# Patient Record
Sex: MALE | Race: WHITE | NOT HISPANIC OR LATINO
[De-identification: names, ages, dates, MRNs, and addresses within clinical notes are randomized per-mention and may not be internally consistent; named-entity substitution may affect disease eponyms.]

---

## 2017-02-28 ENCOUNTER — NON-APPOINTMENT (OUTPATIENT)
Age: 72
End: 2017-02-28

## 2017-02-28 ENCOUNTER — APPOINTMENT (OUTPATIENT)
Dept: CARDIOLOGY | Facility: CLINIC | Age: 72
End: 2017-02-28

## 2017-02-28 VITALS
BODY MASS INDEX: 30.92 KG/M2 | HEART RATE: 64 BPM | OXYGEN SATURATION: 98 % | DIASTOLIC BLOOD PRESSURE: 72 MMHG | WEIGHT: 204 LBS | TEMPERATURE: 97.9 F | SYSTOLIC BLOOD PRESSURE: 115 MMHG | HEIGHT: 68 IN

## 2017-03-02 LAB
ALBUMIN SERPL ELPH-MCNC: 4.2 G/DL
ALP BLD-CCNC: 88 U/L
ALT SERPL-CCNC: 38 U/L
ANION GAP SERPL CALC-SCNC: 14 MMOL/L
AST SERPL-CCNC: 28 U/L
BILIRUB SERPL-MCNC: 0.4 MG/DL
BUN SERPL-MCNC: 13 MG/DL
CALCIUM SERPL-MCNC: 9.9 MG/DL
CHLORIDE SERPL-SCNC: 102 MMOL/L
CHOLEST SERPL-MCNC: 207 MG/DL
CHOLEST/HDLC SERPL: 3.8 RATIO
CO2 SERPL-SCNC: 28 MMOL/L
CREAT SERPL-MCNC: 0.92 MG/DL
GLUCOSE SERPL-MCNC: 101 MG/DL
HBA1C MFR BLD HPLC: 6 %
HDLC SERPL-MCNC: 54 MG/DL
LDLC SERPL CALC-MCNC: 122 MG/DL
POTASSIUM SERPL-SCNC: 4.5 MMOL/L
PROT SERPL-MCNC: 7.3 G/DL
SODIUM SERPL-SCNC: 144 MMOL/L
TRIGL SERPL-MCNC: 154 MG/DL

## 2017-03-07 ENCOUNTER — MEDICATION RENEWAL (OUTPATIENT)
Age: 72
End: 2017-03-07

## 2017-05-16 ENCOUNTER — NON-APPOINTMENT (OUTPATIENT)
Age: 72
End: 2017-05-16

## 2017-05-16 ENCOUNTER — APPOINTMENT (OUTPATIENT)
Dept: CARDIOLOGY | Facility: CLINIC | Age: 72
End: 2017-05-16

## 2017-05-16 VITALS
TEMPERATURE: 97.8 F | BODY MASS INDEX: 30.46 KG/M2 | WEIGHT: 201 LBS | HEART RATE: 64 BPM | SYSTOLIC BLOOD PRESSURE: 147 MMHG | HEIGHT: 68 IN | DIASTOLIC BLOOD PRESSURE: 81 MMHG | OXYGEN SATURATION: 96 %

## 2017-05-16 VITALS — DIASTOLIC BLOOD PRESSURE: 82 MMHG | SYSTOLIC BLOOD PRESSURE: 126 MMHG

## 2017-05-16 DIAGNOSIS — R06.09 OTHER FORMS OF DYSPNEA: ICD-10-CM

## 2017-05-16 DIAGNOSIS — M75.102 UNSPECIFIED ROTATOR CUFF TEAR OR RUPTURE OF LEFT SHOULDER, NOT SPECIFIED AS TRAUMATIC: ICD-10-CM

## 2017-05-16 DIAGNOSIS — I20.9 ANGINA PECTORIS, UNSPECIFIED: ICD-10-CM

## 2017-05-16 DIAGNOSIS — I25.10 ATHEROSCLEROTIC HEART DISEASE OF NATIVE CORONARY ARTERY W/OUT ANGINA PECTORIS: ICD-10-CM

## 2017-05-18 LAB
ALBUMIN SERPL ELPH-MCNC: 4.2 G/DL
ALP BLD-CCNC: 86 U/L
ALT SERPL-CCNC: 41 U/L
ANION GAP SERPL CALC-SCNC: 17 MMOL/L
AST SERPL-CCNC: 28 U/L
BASOPHILS # BLD AUTO: 0.03 K/UL
BASOPHILS NFR BLD AUTO: 0.4 %
BILIRUB SERPL-MCNC: 0.4 MG/DL
BUN SERPL-MCNC: 23 MG/DL
CALCIUM SERPL-MCNC: 9.9 MG/DL
CHLORIDE SERPL-SCNC: 104 MMOL/L
CHOLEST SERPL-MCNC: 215 MG/DL
CHOLEST/HDLC SERPL: 3.4 RATIO
CO2 SERPL-SCNC: 25 MMOL/L
CREAT SERPL-MCNC: 1.07 MG/DL
EOSINOPHIL # BLD AUTO: 0.16 K/UL
EOSINOPHIL NFR BLD AUTO: 2.1 %
GLUCOSE SERPL-MCNC: 91 MG/DL
HBA1C MFR BLD HPLC: 6 %
HCT VFR BLD CALC: 44.9 %
HDLC SERPL-MCNC: 64 MG/DL
HGB BLD-MCNC: 14.2 G/DL
IMM GRANULOCYTES NFR BLD AUTO: 0.3 %
LDLC SERPL CALC-MCNC: 124 MG/DL
LYMPHOCYTES # BLD AUTO: 2.17 K/UL
LYMPHOCYTES NFR BLD AUTO: 28.4 %
MAN DIFF?: NORMAL
MCHC RBC-ENTMCNC: 30.1 PG
MCHC RBC-ENTMCNC: 31.6 GM/DL
MCV RBC AUTO: 95.3 FL
MONOCYTES # BLD AUTO: 0.6 K/UL
MONOCYTES NFR BLD AUTO: 7.8 %
NEUTROPHILS # BLD AUTO: 4.67 K/UL
NEUTROPHILS NFR BLD AUTO: 61 %
PLATELET # BLD AUTO: 279 K/UL
POTASSIUM SERPL-SCNC: 4.2 MMOL/L
PROT SERPL-MCNC: 7.5 G/DL
RBC # BLD: 4.71 M/UL
RBC # FLD: 15 %
SODIUM SERPL-SCNC: 146 MMOL/L
TRIGL SERPL-MCNC: 136 MG/DL
WBC # FLD AUTO: 7.65 K/UL

## 2017-12-07 ENCOUNTER — APPOINTMENT (OUTPATIENT)
Dept: DERMATOLOGY | Facility: CLINIC | Age: 72
End: 2017-12-07
Payer: MEDICARE

## 2017-12-07 VITALS — SYSTOLIC BLOOD PRESSURE: 110 MMHG | DIASTOLIC BLOOD PRESSURE: 70 MMHG

## 2017-12-07 DIAGNOSIS — B07.9 VIRAL WART, UNSPECIFIED: ICD-10-CM

## 2017-12-07 DIAGNOSIS — L82.0 INFLAMED SEBORRHEIC KERATOSIS: ICD-10-CM

## 2017-12-07 PROCEDURE — 99214 OFFICE O/P EST MOD 30 MIN: CPT | Mod: 25

## 2017-12-07 PROCEDURE — 17110 DESTRUCTION B9 LES UP TO 14: CPT

## 2018-08-31 ENCOUNTER — NON-APPOINTMENT (OUTPATIENT)
Age: 73
End: 2018-08-31

## 2018-08-31 ENCOUNTER — APPOINTMENT (OUTPATIENT)
Dept: CARDIOLOGY | Facility: CLINIC | Age: 73
End: 2018-08-31
Payer: MEDICARE

## 2018-08-31 VITALS
WEIGHT: 206 LBS | HEIGHT: 68 IN | SYSTOLIC BLOOD PRESSURE: 156 MMHG | HEART RATE: 74 BPM | BODY MASS INDEX: 31.22 KG/M2 | DIASTOLIC BLOOD PRESSURE: 92 MMHG | OXYGEN SATURATION: 97 %

## 2018-08-31 DIAGNOSIS — I65.29 OCCLUSION AND STENOSIS OF UNSPECIFIED CAROTID ARTERY: ICD-10-CM

## 2018-08-31 PROCEDURE — 99214 OFFICE O/P EST MOD 30 MIN: CPT

## 2018-08-31 PROCEDURE — 93000 ELECTROCARDIOGRAM COMPLETE: CPT

## 2018-08-31 RX ORDER — EZETIMIBE 10 MG/1
10 TABLET ORAL DAILY
Qty: 90 | Refills: 1 | Status: DISCONTINUED | COMMUNITY
Start: 2017-03-02 | End: 2018-08-31

## 2018-09-20 ENCOUNTER — APPOINTMENT (OUTPATIENT)
Dept: CARDIOLOGY | Facility: CLINIC | Age: 73
End: 2018-09-20
Payer: MEDICARE

## 2018-09-20 PROCEDURE — 93880 EXTRACRANIAL BILAT STUDY: CPT

## 2018-10-01 ENCOUNTER — APPOINTMENT (OUTPATIENT)
Dept: CARDIOLOGY | Facility: CLINIC | Age: 73
End: 2018-10-01
Payer: MEDICARE

## 2018-10-01 ENCOUNTER — NON-APPOINTMENT (OUTPATIENT)
Age: 73
End: 2018-10-01

## 2018-10-01 VITALS
DIASTOLIC BLOOD PRESSURE: 70 MMHG | OXYGEN SATURATION: 96 % | BODY MASS INDEX: 32.28 KG/M2 | HEART RATE: 93 BPM | WEIGHT: 213 LBS | HEIGHT: 68 IN | SYSTOLIC BLOOD PRESSURE: 102 MMHG

## 2018-10-01 PROCEDURE — 93000 ELECTROCARDIOGRAM COMPLETE: CPT

## 2018-10-01 PROCEDURE — 99214 OFFICE O/P EST MOD 30 MIN: CPT

## 2018-10-04 LAB
25(OH)D3 SERPL-MCNC: 29.9 NG/ML
ALBUMIN SERPL ELPH-MCNC: 3.9 G/DL
ALP BLD-CCNC: 71 U/L
ALT SERPL-CCNC: 33 U/L
ANION GAP SERPL CALC-SCNC: 16 MMOL/L
APPEARANCE: CLEAR
AST SERPL-CCNC: 36 U/L
BACTERIA: NEGATIVE
BASOPHILS # BLD AUTO: 0.04 K/UL
BASOPHILS NFR BLD AUTO: 0.5 %
BILIRUB SERPL-MCNC: 0.3 MG/DL
BILIRUBIN URINE: NEGATIVE
BLOOD URINE: NEGATIVE
BUN SERPL-MCNC: 19 MG/DL
CALCIUM SERPL-MCNC: 9.8 MG/DL
CHLORIDE SERPL-SCNC: 103 MMOL/L
CHOLEST SERPL-MCNC: 213 MG/DL
CHOLEST/HDLC SERPL: 5 RATIO
CO2 SERPL-SCNC: 24 MMOL/L
COLOR: YELLOW
CREAT SERPL-MCNC: 1.12 MG/DL
EOSINOPHIL # BLD AUTO: 0.25 K/UL
EOSINOPHIL NFR BLD AUTO: 3.3 %
GLUCOSE QUALITATIVE U: NEGATIVE MG/DL
GLUCOSE SERPL-MCNC: 104 MG/DL
HBA1C MFR BLD HPLC: 6.2 %
HCT VFR BLD CALC: 41.2 %
HDLC SERPL-MCNC: 43 MG/DL
HGB BLD-MCNC: 13.6 G/DL
HYALINE CASTS: 6 /LPF
IMM GRANULOCYTES NFR BLD AUTO: 0.1 %
KETONES URINE: NEGATIVE
LDLC SERPL CALC-MCNC: 130 MG/DL
LEUKOCYTE ESTERASE URINE: ABNORMAL
LYMPHOCYTES # BLD AUTO: 1.78 K/UL
LYMPHOCYTES NFR BLD AUTO: 23.6 %
MAN DIFF?: NORMAL
MCHC RBC-ENTMCNC: 30.4 PG
MCHC RBC-ENTMCNC: 33 GM/DL
MCV RBC AUTO: 92.2 FL
MICROSCOPIC-UA: NORMAL
MONOCYTES # BLD AUTO: 0.61 K/UL
MONOCYTES NFR BLD AUTO: 8.1 %
NEUTROPHILS # BLD AUTO: 4.84 K/UL
NEUTROPHILS NFR BLD AUTO: 64.4 %
NITRITE URINE: NEGATIVE
PH URINE: 5.5
PLATELET # BLD AUTO: 268 K/UL
POTASSIUM SERPL-SCNC: 4 MMOL/L
PROT SERPL-MCNC: 7.7 G/DL
PROTEIN URINE: 100 MG/DL
RBC # BLD: 4.47 M/UL
RBC # FLD: 15 %
RED BLOOD CELLS URINE: 1 /HPF
SODIUM SERPL-SCNC: 142 MMOL/L
SPECIFIC GRAVITY URINE: 1.02
SQUAMOUS EPITHELIAL CELLS: 2 /HPF
TRIGL SERPL-MCNC: 199 MG/DL
TSH SERPL-ACNC: 1.59 UIU/ML
UROBILINOGEN URINE: 1 MG/DL
WBC # FLD AUTO: 7.53 K/UL
WHITE BLOOD CELLS URINE: 13 /HPF

## 2018-12-06 ENCOUNTER — LABORATORY RESULT (OUTPATIENT)
Age: 73
End: 2018-12-06

## 2018-12-06 ENCOUNTER — APPOINTMENT (OUTPATIENT)
Dept: DERMATOLOGY | Facility: CLINIC | Age: 73
End: 2018-12-06
Payer: MEDICARE

## 2018-12-06 VITALS — SYSTOLIC BLOOD PRESSURE: 138 MMHG | DIASTOLIC BLOOD PRESSURE: 84 MMHG

## 2018-12-06 DIAGNOSIS — D22.9 MELANOCYTIC NEVI, UNSPECIFIED: ICD-10-CM

## 2018-12-06 DIAGNOSIS — Z12.83 ENCOUNTER FOR SCREENING FOR MALIGNANT NEOPLASM OF SKIN: ICD-10-CM

## 2018-12-06 DIAGNOSIS — D48.5 NEOPLASM OF UNCERTAIN BEHAVIOR OF SKIN: ICD-10-CM

## 2018-12-06 DIAGNOSIS — L82.1 OTHER SEBORRHEIC KERATOSIS: ICD-10-CM

## 2018-12-06 PROCEDURE — 99214 OFFICE O/P EST MOD 30 MIN: CPT | Mod: 25

## 2018-12-06 PROCEDURE — 11101 BIOPSY SKIN SUBQ&/MUCOUS MEMBRANE EA ADDL LESN: CPT

## 2018-12-06 PROCEDURE — 11100 BX SKIN SUBCUTANEOUS&/MUCOUS MEMBRANE 1 LESION: CPT

## 2018-12-13 ENCOUNTER — APPOINTMENT (OUTPATIENT)
Dept: CARDIOLOGY | Facility: CLINIC | Age: 73
End: 2018-12-13
Payer: MEDICARE

## 2018-12-13 VITALS
HEIGHT: 68 IN | HEART RATE: 74 BPM | SYSTOLIC BLOOD PRESSURE: 120 MMHG | BODY MASS INDEX: 31.37 KG/M2 | OXYGEN SATURATION: 96 % | WEIGHT: 207 LBS | DIASTOLIC BLOOD PRESSURE: 82 MMHG

## 2018-12-13 PROCEDURE — 99214 OFFICE O/P EST MOD 30 MIN: CPT

## 2018-12-13 NOTE — PHYSICAL EXAM
[General Appearance - Well Developed] : well developed [Normal Appearance] : normal appearance [Well Groomed] : well groomed [General Appearance - Well Nourished] : well nourished [No Deformities] : no deformities [General Appearance - In No Acute Distress] : no acute distress [Normal Conjunctiva] : the conjunctiva exhibited no abnormalities [Eyelids - No Xanthelasma] : the eyelids demonstrated no xanthelasmas [Normal Oral Mucosa] : normal oral mucosa [No Oral Pallor] : no oral pallor [No Oral Cyanosis] : no oral cyanosis [Normal Jugular Venous A Waves Present] : normal jugular venous A waves present [Normal Jugular Venous V Waves Present] : normal jugular venous V waves present [No Jugular Venous Flores A Waves] : no jugular venous flores A waves [Respiration, Rhythm And Depth] : normal respiratory rhythm and effort [Exaggerated Use Of Accessory Muscles For Inspiration] : no accessory muscle use [Auscultation Breath Sounds / Voice Sounds] : lungs were clear to auscultation bilaterally [Abdomen Soft] : soft [Abdomen Tenderness] : non-tender [Abdomen Mass (___ Cm)] : no abdominal mass palpated [Abnormal Walk] : normal gait [Gait - Sufficient For Exercise Testing] : the gait was sufficient for exercise testing [Nail Clubbing] : no clubbing of the fingernails [Cyanosis, Localized] : no localized cyanosis [Petechial Hemorrhages (___cm)] : no petechial hemorrhages [Skin Color & Pigmentation] : normal skin color and pigmentation [] : no rash [No Venous Stasis] : no venous stasis [Skin Lesions] : no skin lesions [No Skin Ulcers] : no skin ulcer [No Xanthoma] : no  xanthoma was observed [Oriented To Time, Place, And Person] : oriented to person, place, and time [Affect] : the affect was normal [Mood] : the mood was normal [No Anxiety] : not feeling anxious [Normal Rate] : normal [Normal S1] : normal S1 [Normal S2] : normal S2 [S3] : no S3 [S4] : no S4 [II] : a grade 2 [Right Carotid Bruit] : no bruit heard over the right carotid [Left Carotid Bruit] : no bruit heard over the left carotid [Right Femoral Bruit] : no bruit heard over the right femoral artery [Left Femoral Bruit] : no bruit heard over the left femoral artery [2+] : left 2+ [No Abnormalities] : the abdominal aorta was not enlarged and no bruit was heard [No Pitting Edema] : no pitting edema present

## 2018-12-13 NOTE — HISTORY OF PRESENT ILLNESS
[FreeTextEntry1] : Mr. Alvarez is a 73 year-old male with a long history of atherosclerotic heart disease. He had coronary artery bypass graft in 1995. He subsequently had stenting to the saphenous vein graft to the OM1 in 2007 and  2009. In February 2013 the patient was getting a lot of angina and had an abnormal stress test. As of 2013, there was interval closure of the left circumflex stents. The RCA and distal left circumflex were closed, as were seen previously and there were no lesions amenable to PCI. His medications were adjusted; and he has been without angina since that time.\par \par He is feeling well today and his blood pressures are much better controlled. He has resumed his prior BP regimen. I last saw him 10//2018.\par Carotid dopplers with mild plaque bilaterally\par \par He denies chest pain, difficulty breathing, palpitations, lower extremity swelling, orthopnea, PND, dizziness, lightheadedness and near syncope. He is feeling well overall, except for the blood pressures. He reports a stress test and echocardiogram earlier this year, but the results are unavailable.

## 2018-12-13 NOTE — REVIEW OF SYSTEMS
[Blurry Vision] : no blurred vision [see HPI] : see HPI [Shortness Of Breath] : no shortness of breath [Dyspnea on exertion] : not dyspnea during exertion [Lower Ext Edema] : no extremity edema [Leg Claudication] : no intermittent leg claudication [Cough] : no cough [Urinary Frequency] : no change in urinary frequency [Hematuria] : no hematuria [Nocturia] : no nocturia [Joint Pain] : joint pain [Joint Stiffness] : joint stiffness [Negative] : Heme/Lymph

## 2018-12-13 NOTE — REASON FOR VISIT
[Follow-Up - Clinic] : a clinic follow-up of [Angina Pectoris] : angina pectoris [Aortic Stenosis] : aortic stenosis [Chest Pain] : chest pain [Coronary Artery Disease] : coronary artery disease

## 2018-12-13 NOTE — DISCUSSION/SUMMARY
[___ Month(s)] : [unfilled] month(s) [With Me] : with me [FreeTextEntry1] : Mr. Alvarez is a 73 year-old male with a long history of atherosclerotic heart disease and HTN.\par \par He is status post bypass in 1995, with stents to the SVG to OM1 2007 in 2009.\par \par He is feeling well today and his BP is ideal.\par His physical exam is unremarkable. There is no evidence of volume overload. His EKG is a sinus rhythm with probable LVH, nonspecific T-wave inversions, unchanged from prior EKGs.\par \par I will continue his quinapril 20 mg p.o. b.i.d., and Toprol , imdur 30 and norvasc 10. \par \par He will continue taking aspirin and statin for his history of CAD. He is to have his most recent stress test and echocardiogram faxed office. Carotid sonogram with mild to moderate plaque bilaterally.\par His most recent LDL is 130, which remains elevated. I have added Zetia 10 mg po daily back to his regimen.\par We discussed the importance of diet, exercise and weight loss in detail.\par \par I will see him again in 3 months. He knows to call with any issues or questions. \par

## 2019-02-14 ENCOUNTER — APPOINTMENT (OUTPATIENT)
Dept: CARDIOLOGY | Facility: CLINIC | Age: 74
End: 2019-02-14

## 2019-03-19 ENCOUNTER — NON-APPOINTMENT (OUTPATIENT)
Age: 74
End: 2019-03-19

## 2019-03-19 ENCOUNTER — APPOINTMENT (OUTPATIENT)
Dept: CARDIOLOGY | Facility: CLINIC | Age: 74
End: 2019-03-19
Payer: MEDICARE

## 2019-03-19 VITALS
HEART RATE: 93 BPM | OXYGEN SATURATION: 98 % | BODY MASS INDEX: 31.07 KG/M2 | DIASTOLIC BLOOD PRESSURE: 87 MMHG | HEIGHT: 68 IN | SYSTOLIC BLOOD PRESSURE: 131 MMHG | WEIGHT: 205 LBS

## 2019-03-19 PROCEDURE — 93000 ELECTROCARDIOGRAM COMPLETE: CPT

## 2019-03-19 PROCEDURE — 99214 OFFICE O/P EST MOD 30 MIN: CPT

## 2019-03-19 NOTE — DISCUSSION/SUMMARY
[___ Month(s)] : [unfilled] month(s) [With Me] : with me [FreeTextEntry1] : Mr. Alvarez is a 73 year-old male with a long history of atherosclerotic heart disease and HTN.\par \par He is status post bypass in 1995, with stents to the SVG to OM1 2007 in 2009.\par \par He is feeling well today and his BP is at goal\par His physical exam is unremarkable. There is no evidence of volume overload. His EKG is a sinus rhythm with probable LVH, inferior infarct, and nonspecific T-wave inversions, unchanged from prior EKGs.\par \par I will continue his quinapril 20 mg, along with Toprol , imdur 30 and norvasc 10. \par He will continue taking aspirin and statin for his history of CAD. Carotid sonogram with mild to moderate plaque bilaterally. His most recent LDL was 130 on Lipitor, and Zetia has been added to his regimen. I will repeat his blood work today; we discussed the option of changing to the a pcsk9 inhibitor if it remains elevated.\par Stress test in 2018 with inferior infarct and mild fatmata-infarct ischemia.\par We discussed the importance of diet, exercise and weight loss in detail.\par \par I will see him again in 3 months. He knows to call with any issues or questions.

## 2019-03-19 NOTE — HISTORY OF PRESENT ILLNESS
[FreeTextEntry1] : Mr. Alvarez is a 73 year-old male with a long history of atherosclerotic heart disease. He had coronary artery bypass graft in 1995. He subsequently had stenting to the saphenous vein graft to the OM1 in 2007 and  2009. In February 2013 the patient was getting a lot of angina and had an abnormal stress test. As of 2013, there was interval closure of the left circumflex stents. The RCA and distal left circumflex were closed, as were seen previously and there were no lesions amenable to PCI. His medications were adjusted; and he has been without angina since that time.\par \par He is feeling well today and his blood pressures are much better controlled. He has resumed his prior BP regimen though is now taking quinapril once/day. I last saw him 12/13/2018.\par Carotid dopplers with mild plaque bilaterally. Because of suboptimal cholesterol, I added zetia to his regimen.\par He denies chest pain, difficulty breathing, palpitations, lower extremity swelling, orthopnea, PND, dizziness, lightheadedness and near syncope. He is feeling well overall, except for the blood pressures. He reports a stress test and echocardiogram earlier in 2018. Echo in 5/2018 was unremarkable. Pharm stress with large inferior infarct with mild fatmata-infarct ischemia.

## 2019-03-19 NOTE — PHYSICAL EXAM
[General Appearance - Well Developed] : well developed [Normal Appearance] : normal appearance [Well Groomed] : well groomed [General Appearance - Well Nourished] : well nourished [No Deformities] : no deformities [General Appearance - In No Acute Distress] : no acute distress [Normal Conjunctiva] : the conjunctiva exhibited no abnormalities [Eyelids - No Xanthelasma] : the eyelids demonstrated no xanthelasmas [Normal Oral Mucosa] : normal oral mucosa [No Oral Pallor] : no oral pallor [No Oral Cyanosis] : no oral cyanosis [Normal Jugular Venous A Waves Present] : normal jugular venous A waves present [Normal Jugular Venous V Waves Present] : normal jugular venous V waves present [No Jugular Venous Flores A Waves] : no jugular venous flores A waves [Respiration, Rhythm And Depth] : normal respiratory rhythm and effort [Exaggerated Use Of Accessory Muscles For Inspiration] : no accessory muscle use [Auscultation Breath Sounds / Voice Sounds] : lungs were clear to auscultation bilaterally [Abdomen Soft] : soft [Abdomen Tenderness] : non-tender [Abdomen Mass (___ Cm)] : no abdominal mass palpated [Abnormal Walk] : normal gait [Gait - Sufficient For Exercise Testing] : the gait was sufficient for exercise testing [Nail Clubbing] : no clubbing of the fingernails [Cyanosis, Localized] : no localized cyanosis [Petechial Hemorrhages (___cm)] : no petechial hemorrhages [Skin Color & Pigmentation] : normal skin color and pigmentation [] : no rash [No Venous Stasis] : no venous stasis [Skin Lesions] : no skin lesions [No Skin Ulcers] : no skin ulcer [Oriented To Time, Place, And Person] : oriented to person, place, and time [No Xanthoma] : no  xanthoma was observed [Affect] : the affect was normal [Mood] : the mood was normal [No Anxiety] : not feeling anxious [Normal Rate] : normal [Normal S1] : normal S1 [Normal S2] : normal S2 [S3] : no S3 [S4] : no S4 [II] : a grade 2 [Right Carotid Bruit] : no bruit heard over the right carotid [Left Carotid Bruit] : no bruit heard over the left carotid [Right Femoral Bruit] : no bruit heard over the right femoral artery [Left Femoral Bruit] : no bruit heard over the left femoral artery [2+] : left 2+ [No Abnormalities] : the abdominal aorta was not enlarged and no bruit was heard [No Pitting Edema] : no pitting edema present

## 2019-03-19 NOTE — REVIEW OF SYSTEMS
[Blurry Vision] : no blurred vision [see HPI] : see HPI [Shortness Of Breath] : no shortness of breath [Lower Ext Edema] : no extremity edema [Dyspnea on exertion] : not dyspnea during exertion [Leg Claudication] : no intermittent leg claudication [Cough] : no cough [Urinary Frequency] : no change in urinary frequency [Hematuria] : no hematuria [Joint Pain] : joint pain [Nocturia] : no nocturia [Joint Stiffness] : joint stiffness [Negative] : Endocrine

## 2019-03-21 LAB
ALBUMIN SERPL ELPH-MCNC: 4.5 G/DL
ALP BLD-CCNC: 85 U/L
ALT SERPL-CCNC: 28 U/L
ANION GAP SERPL CALC-SCNC: 15 MMOL/L
APPEARANCE: CLEAR
AST SERPL-CCNC: 28 U/L
BACTERIA: NEGATIVE
BASOPHILS # BLD AUTO: 0.04 K/UL
BASOPHILS NFR BLD AUTO: 0.5 %
BILIRUB SERPL-MCNC: 0.3 MG/DL
BILIRUBIN URINE: NEGATIVE
BLOOD URINE: NEGATIVE
BUN SERPL-MCNC: 15 MG/DL
CALCIUM SERPL-MCNC: 10.2 MG/DL
CHLORIDE SERPL-SCNC: 105 MMOL/L
CHOLEST SERPL-MCNC: 197 MG/DL
CHOLEST/HDLC SERPL: 4.1 RATIO
CO2 SERPL-SCNC: 25 MMOL/L
COLOR: YELLOW
CREAT SERPL-MCNC: 1.09 MG/DL
EOSINOPHIL # BLD AUTO: 0.25 K/UL
EOSINOPHIL NFR BLD AUTO: 3.3 %
GLUCOSE QUALITATIVE U: NEGATIVE
GLUCOSE SERPL-MCNC: 122 MG/DL
HBA1C MFR BLD HPLC: 6 %
HCT VFR BLD CALC: 42.8 %
HDLC SERPL-MCNC: 48 MG/DL
HGB BLD-MCNC: 13.5 G/DL
HYALINE CASTS: 0 /LPF
IMM GRANULOCYTES NFR BLD AUTO: 0.3 %
KETONES URINE: NEGATIVE
LDLC SERPL CALC-MCNC: 108 MG/DL
LEUKOCYTE ESTERASE URINE: NEGATIVE
LYMPHOCYTES # BLD AUTO: 1.5 K/UL
LYMPHOCYTES NFR BLD AUTO: 20 %
MAN DIFF?: NORMAL
MCHC RBC-ENTMCNC: 30 PG
MCHC RBC-ENTMCNC: 31.5 GM/DL
MCV RBC AUTO: 95.1 FL
MICROSCOPIC-UA: NORMAL
MONOCYTES # BLD AUTO: 0.58 K/UL
MONOCYTES NFR BLD AUTO: 7.7 %
NEUTROPHILS # BLD AUTO: 5.11 K/UL
NEUTROPHILS NFR BLD AUTO: 68.2 %
NITRITE URINE: NEGATIVE
PH URINE: 5.5
PLATELET # BLD AUTO: 290 K/UL
POTASSIUM SERPL-SCNC: 4.5 MMOL/L
PROT SERPL-MCNC: 7.4 G/DL
PROTEIN URINE: ABNORMAL
RBC # BLD: 4.5 M/UL
RBC # FLD: 14.5 %
RED BLOOD CELLS URINE: 1 /HPF
SODIUM SERPL-SCNC: 145 MMOL/L
SPECIFIC GRAVITY URINE: 1.02
SQUAMOUS EPITHELIAL CELLS: 2 /HPF
TRIGL SERPL-MCNC: 205 MG/DL
TSH SERPL-ACNC: 1.21 UIU/ML
URINE COMMENTS: NORMAL
UROBILINOGEN URINE: NORMAL
WBC # FLD AUTO: 7.5 K/UL
WHITE BLOOD CELLS URINE: 12 /HPF

## 2019-03-22 ENCOUNTER — RX RENEWAL (OUTPATIENT)
Age: 74
End: 2019-03-22

## 2019-06-24 ENCOUNTER — NON-APPOINTMENT (OUTPATIENT)
Age: 74
End: 2019-06-24

## 2019-06-24 ENCOUNTER — APPOINTMENT (OUTPATIENT)
Dept: CARDIOLOGY | Facility: CLINIC | Age: 74
End: 2019-06-24
Payer: MEDICARE

## 2019-06-24 VITALS
DIASTOLIC BLOOD PRESSURE: 80 MMHG | BODY MASS INDEX: 31.67 KG/M2 | WEIGHT: 209 LBS | HEIGHT: 68 IN | SYSTOLIC BLOOD PRESSURE: 120 MMHG | HEART RATE: 77 BPM | OXYGEN SATURATION: 96 %

## 2019-06-24 DIAGNOSIS — R06.00 DYSPNEA, UNSPECIFIED: ICD-10-CM

## 2019-06-24 PROCEDURE — 93000 ELECTROCARDIOGRAM COMPLETE: CPT

## 2019-06-24 PROCEDURE — 99214 OFFICE O/P EST MOD 30 MIN: CPT

## 2019-06-24 NOTE — HISTORY OF PRESENT ILLNESS
[FreeTextEntry1] : Mr. Alvarez is a 73 year-old male with a long history of atherosclerotic heart disease. He had coronary artery bypass graft in 1995. He subsequently had stenting to the saphenous vein graft to the OM1 in 2007 and  2009. In February 2013 the patient was getting a lot of angina and had an abnormal stress test. As of 2013, there was interval closure of the left circumflex stents. The RCA and distal left circumflex were closed, as were seen previously and there were no lesions amenable to PCI. His medications were adjusted; and he has been without angina since that time.\par \par He is feeling well today and his blood pressures are much better controlled. He has resumed his prior BP regimen though is now taking quinapril once/day. I last saw him 3/2019.\par Carotid dopplers with mild plaque bilaterally. Because of suboptimal cholesterol, I added zetia to his regimen.\par He denies chest pain, palpitations, lower extremity swelling, orthopnea, PND, dizziness, lightheadedness and near syncope. \par He reports some dyspnea on exertion, though thinks his exercise tolerance is down because of orthopedic hip and back pain.\par Echo in 5/2018 was unremarkable. Pharm stress with large inferior infarct with mild fatmata-infarct ischemia, which was decided to be medically managed.

## 2019-06-24 NOTE — PHYSICAL EXAM
[General Appearance - Well Developed] : well developed [Normal Appearance] : normal appearance [Well Groomed] : well groomed [General Appearance - Well Nourished] : well nourished [No Deformities] : no deformities [Normal Conjunctiva] : the conjunctiva exhibited no abnormalities [General Appearance - In No Acute Distress] : no acute distress [Normal Oral Mucosa] : normal oral mucosa [Eyelids - No Xanthelasma] : the eyelids demonstrated no xanthelasmas [No Oral Pallor] : no oral pallor [No Oral Cyanosis] : no oral cyanosis [Normal Jugular Venous A Waves Present] : normal jugular venous A waves present [Normal Jugular Venous V Waves Present] : normal jugular venous V waves present [No Jugular Venous Flores A Waves] : no jugular venous flores A waves [Respiration, Rhythm And Depth] : normal respiratory rhythm and effort [Exaggerated Use Of Accessory Muscles For Inspiration] : no accessory muscle use [Abdomen Soft] : soft [Auscultation Breath Sounds / Voice Sounds] : lungs were clear to auscultation bilaterally [Abdomen Tenderness] : non-tender [Abdomen Mass (___ Cm)] : no abdominal mass palpated [Gait - Sufficient For Exercise Testing] : the gait was sufficient for exercise testing [Abnormal Walk] : normal gait [Nail Clubbing] : no clubbing of the fingernails [Cyanosis, Localized] : no localized cyanosis [Petechial Hemorrhages (___cm)] : no petechial hemorrhages [Skin Color & Pigmentation] : normal skin color and pigmentation [] : no rash [No Venous Stasis] : no venous stasis [Skin Lesions] : no skin lesions [No Skin Ulcers] : no skin ulcer [No Xanthoma] : no  xanthoma was observed [Affect] : the affect was normal [Oriented To Time, Place, And Person] : oriented to person, place, and time [Mood] : the mood was normal [No Anxiety] : not feeling anxious [Normal Rate] : normal [Normal S1] : normal S1 [Normal S2] : normal S2 [S3] : no S3 [S4] : no S4 [II] : a grade 2 [Right Carotid Bruit] : no bruit heard over the right carotid [Left Carotid Bruit] : no bruit heard over the left carotid [Left Femoral Bruit] : no bruit heard over the left femoral artery [Right Femoral Bruit] : no bruit heard over the right femoral artery [2+] : left 2+ [No Abnormalities] : the abdominal aorta was not enlarged and no bruit was heard [No Pitting Edema] : no pitting edema present

## 2019-06-24 NOTE — REVIEW OF SYSTEMS
[Blurry Vision] : no blurred vision [see HPI] : see HPI [Shortness Of Breath] : shortness of breath [Dyspnea on exertion] : dyspnea during exertion [Lower Ext Edema] : no extremity edema [Leg Claudication] : no intermittent leg claudication [Cough] : no cough [Urinary Frequency] : no change in urinary frequency [Hematuria] : no hematuria [Nocturia] : no nocturia [Joint Pain] : joint pain [Joint Stiffness] : joint stiffness [Negative] : Heme/Lymph

## 2019-06-24 NOTE — DISCUSSION/SUMMARY
[___ Month(s)] : [unfilled] month(s) [With Me] : with me [FreeTextEntry1] : Mr. Alvraez is a 73 year-old male with a long history of atherosclerotic heart disease and HTN.\par He is status post bypass in 1995, with stents to the SVG to OM1 2007 in 2009.\par \par He reports some mild dyspnea on exertion over the last few weeks, which he noticed while he was on vacation in [facility]. His BP is finally controlled\par His physical exam is unremarkable. There is no evidence of volume overload. His EKG is a sinus rhythm with probable LVH, inferior infarct, and nonspecific T-wave inversions, unchanged from prior EKGs.\par \par I will continue his quinapril 20 mg, along with Toprol , imdur 30 and norvasc 10. \par He will continue taking aspirin and statin for his history of CAD. Carotid sonogram with mild to moderate plaque bilaterally. His most recent LDL was 108 on Lipitor, and Zetia. We discussed the option of changing to the a pcsk9 inhibitor if it remains elevated, though he would like to hold off\par Stress test in 2018 with inferior infarct and mild fatmata-infarct ischemia. He will have a 2d echocardiogram to confirm no change in LV function in the setting of dyspnea.\par We discussed the importance of diet, exercise and weight loss in detail.\par \par I will see him again in 3 months. He knows to call with any issues or questions.

## 2019-07-10 ENCOUNTER — APPOINTMENT (OUTPATIENT)
Dept: CARDIOLOGY | Facility: CLINIC | Age: 74
End: 2019-07-10
Payer: MEDICARE

## 2019-07-10 PROCEDURE — 93306 TTE W/DOPPLER COMPLETE: CPT

## 2019-07-17 ENCOUNTER — RX RENEWAL (OUTPATIENT)
Age: 74
End: 2019-07-17

## 2019-08-12 ENCOUNTER — RX RENEWAL (OUTPATIENT)
Age: 74
End: 2019-08-12

## 2019-09-16 ENCOUNTER — MEDICATION RENEWAL (OUTPATIENT)
Age: 74
End: 2019-09-16

## 2019-11-15 ENCOUNTER — NON-APPOINTMENT (OUTPATIENT)
Age: 74
End: 2019-11-15

## 2019-11-15 ENCOUNTER — APPOINTMENT (OUTPATIENT)
Dept: CARDIOLOGY | Facility: CLINIC | Age: 74
End: 2019-11-15
Payer: MEDICARE

## 2019-11-15 VITALS
HEIGHT: 68 IN | BODY MASS INDEX: 31.83 KG/M2 | HEART RATE: 74 BPM | OXYGEN SATURATION: 96 % | DIASTOLIC BLOOD PRESSURE: 72 MMHG | SYSTOLIC BLOOD PRESSURE: 104 MMHG | WEIGHT: 210 LBS

## 2019-11-15 PROCEDURE — 93000 ELECTROCARDIOGRAM COMPLETE: CPT

## 2019-11-15 PROCEDURE — 99214 OFFICE O/P EST MOD 30 MIN: CPT

## 2019-11-15 NOTE — REASON FOR VISIT
[Follow-Up - Clinic] : a clinic follow-up of [Aortic Stenosis] : aortic stenosis [Angina Pectoris] : angina pectoris [Coronary Artery Disease] : coronary artery disease [Chest Pain] : chest pain

## 2019-11-15 NOTE — PHYSICAL EXAM
[Normal Appearance] : normal appearance [General Appearance - Well Developed] : well developed [Well Groomed] : well groomed [No Deformities] : no deformities [General Appearance - In No Acute Distress] : no acute distress [General Appearance - Well Nourished] : well nourished [Normal Conjunctiva] : the conjunctiva exhibited no abnormalities [Eyelids - No Xanthelasma] : the eyelids demonstrated no xanthelasmas [Normal Oral Mucosa] : normal oral mucosa [No Oral Pallor] : no oral pallor [Normal Jugular Venous V Waves Present] : normal jugular venous V waves present [Normal Jugular Venous A Waves Present] : normal jugular venous A waves present [No Oral Cyanosis] : no oral cyanosis [No Jugular Venous Flores A Waves] : no jugular venous flores A waves [Exaggerated Use Of Accessory Muscles For Inspiration] : no accessory muscle use [Respiration, Rhythm And Depth] : normal respiratory rhythm and effort [Auscultation Breath Sounds / Voice Sounds] : lungs were clear to auscultation bilaterally [Abdomen Tenderness] : non-tender [Abdomen Soft] : soft [Abnormal Walk] : normal gait [Abdomen Mass (___ Cm)] : no abdominal mass palpated [Nail Clubbing] : no clubbing of the fingernails [Gait - Sufficient For Exercise Testing] : the gait was sufficient for exercise testing [Cyanosis, Localized] : no localized cyanosis [Petechial Hemorrhages (___cm)] : no petechial hemorrhages [Skin Color & Pigmentation] : normal skin color and pigmentation [] : no rash [No Venous Stasis] : no venous stasis [No Skin Ulcers] : no skin ulcer [Skin Lesions] : no skin lesions [No Xanthoma] : no  xanthoma was observed [Oriented To Time, Place, And Person] : oriented to person, place, and time [Mood] : the mood was normal [Affect] : the affect was normal [No Anxiety] : not feeling anxious [Normal Rate] : normal [Normal S1] : normal S1 [S3] : no S3 [Normal S2] : normal S2 [S4] : no S4 [II] : a grade 2 [Right Femoral Bruit] : no bruit heard over the right femoral artery [Left Carotid Bruit] : no bruit heard over the left carotid [Left Femoral Bruit] : no bruit heard over the left femoral artery [Right Carotid Bruit] : no bruit heard over the right carotid [2+] : right 2+ [No Abnormalities] : the abdominal aorta was not enlarged and no bruit was heard [No Pitting Edema] : no pitting edema present

## 2019-11-15 NOTE — HISTORY OF PRESENT ILLNESS
[FreeTextEntry1] : Mr. Alvarez is a 74 year-old male with a long history of atherosclerotic heart disease. He had coronary artery bypass graft in 1995. He subsequently had stenting to the saphenous vein graft to the OM1 in 2007 and  2009. In February 2013 the patient was getting a lot of angina and had an abnormal stress test. As of 2013, there was interval closure of the left circumflex stents. The RCA and distal left circumflex were closed, as were seen previously and there were no lesions amenable to PCI. His medications were adjusted; and he has been without angina since that time.\par \par He is feeling well today and his blood pressures are much better controlled. He has resumed his prior BP regimen though is now taking quinapril once/day. I last saw him 6/2019\par Carotid dopplers with mild plaque bilaterally. Because of suboptimal cholesterol, I added zetia to his regimen. His most recent ldl was around 100.\par He denies chest pain, palpitations, lower extremity swelling, orthopnea, PND, dizziness, lightheadedness and near syncope.  He recently returned from a trip from [facility], and felt well.\par \par Echo in 5/2018 was unremarkable. Pharm stress with large inferior infarct with mild fatmata-infarct ischemia, which was decided to be medically managed.

## 2019-11-15 NOTE — REVIEW OF SYSTEMS
[see HPI] : see HPI [Blurry Vision] : no blurred vision [Dyspnea on exertion] : dyspnea during exertion [Lower Ext Edema] : no extremity edema [Cough] : no cough [Leg Claudication] : no intermittent leg claudication [Hematuria] : no hematuria [Nocturia] : no nocturia [Urinary Frequency] : no change in urinary frequency [Joint Stiffness] : joint stiffness [Joint Pain] : joint pain [Negative] : Endocrine

## 2019-11-15 NOTE — DISCUSSION/SUMMARY
[With Me] : with me [___ Month(s)] : [unfilled] month(s) [FreeTextEntry1] : Mr. Alvarez is a 74 year-old male with a long history of atherosclerotic heart disease and HTN.\par He is status post bypass in 1995, with stents to the SVG to OM1 2007 in 2009.\par \par He is feeling well and his BP is better controlled.\par His physical exam is unremarkable. There is no evidence of volume overload. His EKG is a sinus rhythm with probable LVH, inferior infarct, and nonspecific T-wave inversions, unchanged from prior EKGs.\par \par I will continue his quinapril 40 mg, along with Toprol , imdur 30 and norvasc 10. \par He will continue taking aspirin and statin for his history of CAD. Carotid sonogram with mild to moderate plaque bilaterally. His most recent LDL was 108 on Lipitor, and Zetia. We discussed the option of changing to the a pcsk9 inhibitor if it remains elevated, though he would like to hold off\par Stress test in 2018 with inferior infarct and mild fatmata-infarct ischemia. Echo with normal LV function in 7/2019.\par We discussed the importance of diet, exercise and weight loss in detail.\par \par I will see him again in 3 months. He knows to call with any issues or questions.

## 2019-12-05 ENCOUNTER — APPOINTMENT (OUTPATIENT)
Dept: DERMATOLOGY | Facility: CLINIC | Age: 74
End: 2019-12-05

## 2020-02-12 ENCOUNTER — APPOINTMENT (OUTPATIENT)
Dept: CARDIOLOGY | Facility: CLINIC | Age: 75
End: 2020-02-12
Payer: MEDICARE

## 2020-02-12 VITALS
HEART RATE: 72 BPM | BODY MASS INDEX: 28.19 KG/M2 | DIASTOLIC BLOOD PRESSURE: 73 MMHG | SYSTOLIC BLOOD PRESSURE: 125 MMHG | HEIGHT: 68 IN | WEIGHT: 186 LBS | OXYGEN SATURATION: 96 %

## 2020-02-12 PROCEDURE — 99214 OFFICE O/P EST MOD 30 MIN: CPT

## 2020-02-12 NOTE — REVIEW OF SYSTEMS
[Recent Weight Loss (___ Lbs)] : recent [unfilled] ~Ulb weight loss [Dyspnea on exertion] : dyspnea during exertion [see HPI] : see HPI [Joint Pain] : joint pain [Joint Stiffness] : joint stiffness [Negative] : Heme/Lymph [Blurry Vision] : no blurred vision [Lower Ext Edema] : no extremity edema [Leg Claudication] : no intermittent leg claudication [Cough] : no cough [Urinary Frequency] : no change in urinary frequency [Hematuria] : no hematuria [Nocturia] : no nocturia

## 2020-02-12 NOTE — REASON FOR VISIT
[Aortic Stenosis] : aortic stenosis [Angina Pectoris] : angina pectoris [Follow-Up - Clinic] : a clinic follow-up of [Coronary Artery Disease] : coronary artery disease [Chest Pain] : chest pain

## 2020-02-12 NOTE — HISTORY OF PRESENT ILLNESS
[FreeTextEntry1] : Mr. Alvarez is a 74 year-old male with a long history of atherosclerotic heart disease. He had coronary artery bypass graft in 1995. He subsequently had stenting to the saphenous vein graft to the OM1 in 2007 and  2009. In February 2013 the patient was getting a lot of angina and had an abnormal stress test. As of 2013, there was interval closure of the left circumflex stents. The RCA and distal left circumflex were closed, as were seen previously and there were no lesions amenable to PCI. His medications were adjusted; and he has been without angina since that time.\par \par He is feeling well today and his blood pressures are much better controlled. He has resumed his prior BP regimen though is now taking quinapril once/day. I last saw him 11/2019\par Since last visit, he is on Weight watchers and lost >20 lbs.\par Carotid dopplers with mild plaque bilaterally. Because of suboptimal cholesterol, I added zetia to his regimen. His most recent ldl was around 100.\par He denies chest pain, palpitations, lower extremity swelling, orthopnea, PND, dizziness, lightheadedness and near syncope.  \par \par Echo in 5/2018 was unremarkable. Pharm stress with large inferior infarct with mild fatmata-infarct ischemia, which was decided to be medically managed.

## 2020-02-12 NOTE — DISCUSSION/SUMMARY
[___ Month(s)] : [unfilled] month(s) [With Me] : with me [FreeTextEntry1] : Mr. Alvarez is a 74 year-old male with a long history of atherosclerotic heart disease and HTN.\par He is status post bypass in 1995, with stents to the SVG to OM1 2007 in 2009.\par \par He is feeling well and his BP is better controlled.\par His physical exam is unremarkable. There is no evidence of volume overload. His last EKG demonstrated a sinus rhythm with probable LVH, inferior infarct, and nonspecific T-wave inversions, unchanged from prior EKGs.\par \par I will continue his quinapril 20 mg, along with Toprol , imdur 30 and norvasc 10. \par He will continue taking aspirin and statin for his history of CAD. Carotid sonogram with mild to moderate plaque bilaterally. His most recent LDL was 108 on Lipitor, and Zetia. We discussed the option of changing to the a pcsk9 inhibitor if it remains elevated, though he would like to hold off. I will repeat his blood work\par Stress test in 2018 with inferior infarct and mild fatmata-infarct ischemia. Echo with normal LV function in 7/2019.\par We discussed the importance of diet, exercise and weight loss in detail.\par \par I will see him again in 3 months. He knows to call with any issues or questions.

## 2020-02-12 NOTE — PHYSICAL EXAM
[General Appearance - Well Developed] : well developed [Normal Appearance] : normal appearance [No Deformities] : no deformities [General Appearance - Well Nourished] : well nourished [Well Groomed] : well groomed [Normal Conjunctiva] : the conjunctiva exhibited no abnormalities [General Appearance - In No Acute Distress] : no acute distress [Normal Oral Mucosa] : normal oral mucosa [Eyelids - No Xanthelasma] : the eyelids demonstrated no xanthelasmas [No Oral Pallor] : no oral pallor [Normal Jugular Venous A Waves Present] : normal jugular venous A waves present [No Oral Cyanosis] : no oral cyanosis [No Jugular Venous Flores A Waves] : no jugular venous flores A waves [Normal Jugular Venous V Waves Present] : normal jugular venous V waves present [Auscultation Breath Sounds / Voice Sounds] : lungs were clear to auscultation bilaterally [Respiration, Rhythm And Depth] : normal respiratory rhythm and effort [Exaggerated Use Of Accessory Muscles For Inspiration] : no accessory muscle use [Abdomen Tenderness] : non-tender [Abdomen Soft] : soft [Abdomen Mass (___ Cm)] : no abdominal mass palpated [Abnormal Walk] : normal gait [Gait - Sufficient For Exercise Testing] : the gait was sufficient for exercise testing [Nail Clubbing] : no clubbing of the fingernails [Skin Color & Pigmentation] : normal skin color and pigmentation [Cyanosis, Localized] : no localized cyanosis [Petechial Hemorrhages (___cm)] : no petechial hemorrhages [] : no rash [No Venous Stasis] : no venous stasis [Skin Lesions] : no skin lesions [No Skin Ulcers] : no skin ulcer [No Xanthoma] : no  xanthoma was observed [Oriented To Time, Place, And Person] : oriented to person, place, and time [Affect] : the affect was normal [Mood] : the mood was normal [No Anxiety] : not feeling anxious [Normal Rate] : normal [Normal S1] : normal S1 [Normal S2] : normal S2 [II] : a grade 2 [2+] : right 2+ [No Pitting Edema] : no pitting edema present [No Abnormalities] : the abdominal aorta was not enlarged and no bruit was heard [S3] : no S3 [Right Carotid Bruit] : no bruit heard over the right carotid [S4] : no S4 [Left Carotid Bruit] : no bruit heard over the left carotid [Right Femoral Bruit] : no bruit heard over the right femoral artery [Left Femoral Bruit] : no bruit heard over the left femoral artery

## 2020-02-18 LAB
ALBUMIN SERPL ELPH-MCNC: 3.9 G/DL
ALP BLD-CCNC: 83 U/L
ALT SERPL-CCNC: 26 U/L
ANION GAP SERPL CALC-SCNC: 13 MMOL/L
AST SERPL-CCNC: 30 U/L
BILIRUB SERPL-MCNC: 0.4 MG/DL
BUN SERPL-MCNC: 12 MG/DL
CALCIUM SERPL-MCNC: 9.6 MG/DL
CHLORIDE SERPL-SCNC: 104 MMOL/L
CHOLEST SERPL-MCNC: 112 MG/DL
CHOLEST/HDLC SERPL: 2.6 RATIO
CO2 SERPL-SCNC: 27 MMOL/L
CREAT SERPL-MCNC: 1.15 MG/DL
ESTIMATED AVERAGE GLUCOSE: 123 MG/DL
GLUCOSE SERPL-MCNC: 92 MG/DL
HBA1C MFR BLD HPLC: 5.9 %
HDLC SERPL-MCNC: 43 MG/DL
LDLC SERPL CALC-MCNC: 41 MG/DL
POTASSIUM SERPL-SCNC: 3.8 MMOL/L
PROT SERPL-MCNC: 6.9 G/DL
SODIUM SERPL-SCNC: 144 MMOL/L
TRIGL SERPL-MCNC: 140 MG/DL

## 2020-05-22 ENCOUNTER — TRANSCRIPTION ENCOUNTER (OUTPATIENT)
Age: 75
End: 2020-05-22

## 2020-07-17 ENCOUNTER — NON-APPOINTMENT (OUTPATIENT)
Age: 75
End: 2020-07-17

## 2020-07-17 ENCOUNTER — APPOINTMENT (OUTPATIENT)
Dept: INTERNAL MEDICINE | Facility: CLINIC | Age: 75
End: 2020-07-17
Payer: MEDICARE

## 2020-07-17 ENCOUNTER — APPOINTMENT (OUTPATIENT)
Dept: CARDIOLOGY | Facility: CLINIC | Age: 75
End: 2020-07-17
Payer: MEDICARE

## 2020-07-17 VITALS — SYSTOLIC BLOOD PRESSURE: 112 MMHG | DIASTOLIC BLOOD PRESSURE: 65 MMHG

## 2020-07-17 VITALS
BODY MASS INDEX: 24.71 KG/M2 | HEIGHT: 68 IN | WEIGHT: 163 LBS | SYSTOLIC BLOOD PRESSURE: 120 MMHG | RESPIRATION RATE: 14 BRPM | HEART RATE: 82 BPM | OXYGEN SATURATION: 96 % | DIASTOLIC BLOOD PRESSURE: 70 MMHG | TEMPERATURE: 97.7 F

## 2020-07-17 VITALS
WEIGHT: 165 LBS | HEIGHT: 68 IN | SYSTOLIC BLOOD PRESSURE: 95 MMHG | OXYGEN SATURATION: 96 % | DIASTOLIC BLOOD PRESSURE: 59 MMHG | BODY MASS INDEX: 25.01 KG/M2 | HEART RATE: 58 BPM

## 2020-07-17 DIAGNOSIS — R73.9 HYPERGLYCEMIA, UNSPECIFIED: ICD-10-CM

## 2020-07-17 DIAGNOSIS — E78.5 HYPERLIPIDEMIA, UNSPECIFIED: ICD-10-CM

## 2020-07-17 PROCEDURE — 93000 ELECTROCARDIOGRAM COMPLETE: CPT

## 2020-07-17 PROCEDURE — 99214 OFFICE O/P EST MOD 30 MIN: CPT

## 2020-07-17 PROCEDURE — 99203 OFFICE O/P NEW LOW 30 MIN: CPT

## 2020-07-17 NOTE — REVIEW OF SYSTEMS
[Recent Weight Loss (___ Lbs)] : recent [unfilled] ~Ulb weight loss [Blurry Vision] : no blurred vision [see HPI] : see HPI [Dyspnea on exertion] : dyspnea during exertion [Leg Claudication] : no intermittent leg claudication [Lower Ext Edema] : no extremity edema [Cough] : no cough [Urinary Frequency] : no change in urinary frequency [Nocturia] : no nocturia [Hematuria] : no hematuria [Joint Stiffness] : joint stiffness [Joint Pain] : joint pain [Negative] : Heme/Lymph

## 2020-07-17 NOTE — PHYSICAL EXAM
[General Appearance - Well Developed] : well developed [General Appearance - Well Nourished] : well nourished [Normal Appearance] : normal appearance [Well Groomed] : well groomed [Normal Conjunctiva] : the conjunctiva exhibited no abnormalities [General Appearance - In No Acute Distress] : no acute distress [No Deformities] : no deformities [Normal Oral Mucosa] : normal oral mucosa [Eyelids - No Xanthelasma] : the eyelids demonstrated no xanthelasmas [Normal Jugular Venous A Waves Present] : normal jugular venous A waves present [No Oral Cyanosis] : no oral cyanosis [No Oral Pallor] : no oral pallor [No Jugular Venous Flores A Waves] : no jugular venous flores A waves [Normal Jugular Venous V Waves Present] : normal jugular venous V waves present [Respiration, Rhythm And Depth] : normal respiratory rhythm and effort [Abdomen Soft] : soft [Auscultation Breath Sounds / Voice Sounds] : lungs were clear to auscultation bilaterally [Exaggerated Use Of Accessory Muscles For Inspiration] : no accessory muscle use [Abdomen Mass (___ Cm)] : no abdominal mass palpated [Abdomen Tenderness] : non-tender [Gait - Sufficient For Exercise Testing] : the gait was sufficient for exercise testing [Abnormal Walk] : normal gait [Nail Clubbing] : no clubbing of the fingernails [Petechial Hemorrhages (___cm)] : no petechial hemorrhages [Cyanosis, Localized] : no localized cyanosis [Skin Color & Pigmentation] : normal skin color and pigmentation [No Venous Stasis] : no venous stasis [] : no rash [No Xanthoma] : no  xanthoma was observed [No Skin Ulcers] : no skin ulcer [Skin Lesions] : no skin lesions [No Anxiety] : not feeling anxious [Oriented To Time, Place, And Person] : oriented to person, place, and time [Mood] : the mood was normal [Affect] : the affect was normal [Normal Rate] : normal [Normal S1] : normal S1 [S3] : no S3 [Normal S2] : normal S2 [S4] : no S4 [II] : a grade 2 [Right Carotid Bruit] : no bruit heard over the right carotid [Left Carotid Bruit] : no bruit heard over the left carotid [Right Femoral Bruit] : no bruit heard over the right femoral artery [Left Femoral Bruit] : no bruit heard over the left femoral artery [No Abnormalities] : the abdominal aorta was not enlarged and no bruit was heard [No Pitting Edema] : no pitting edema present [2+] : left 2+

## 2020-07-17 NOTE — REASON FOR VISIT
[Aortic Stenosis] : aortic stenosis [Follow-Up - Clinic] : a clinic follow-up of [Angina Pectoris] : angina pectoris [Chest Pain] : chest pain [Coronary Artery Disease] : coronary artery disease [Spouse] : spouse

## 2020-07-17 NOTE — DISCUSSION/SUMMARY
[With Me] : with me [___ Month(s)] : [unfilled] month(s) [FreeTextEntry1] : Mr. Alvarez is a 74 year-old male with a long history of atherosclerotic heart disease and HTN.\par He is status post bypass in 1995, with stents to the SVG to OM1 2007 in 2009.\par \par He is feeling well and his BP is better controlled. He has lost a significant amount of weight on weight watchers.\par His physical exam is unremarkable. There is no evidence of volume overload. His last EKG demonstrated a sinus bradycardia, unchanged from prior EKGs.\par \par I will continue his quinapril 20 mg, along with Toprol , imdur 30 and norvasc 10. \par He will continue taking aspirin and statin for his history of CAD. Carotid sonogram with mild to moderate plaque bilaterally. His most recent LDL was 108 on Lipitor, and Zetia; Repeat blood work was sent today. We discussed the option of changing to the a pcsk9 inhibitor if it remains elevated, though he would like to hold off.\par Stress test in 2018 with inferior infarct and mild fatmata-infarct ischemia. Echo with normal LV function in 7/2019.\par We discussed the importance of diet, exercise and weight loss in detail.\par \par I will see him again in 4-6 months. He knows to call with any issues or questions.

## 2020-07-17 NOTE — HISTORY OF PRESENT ILLNESS
[FreeTextEntry8] : Pt here today to establish care\par Pt had 2 days of shakes. no temps.\par Symptoms free for 3 days.\par Feels fine now.\par Recently lost 40 pounds on [employer]. Off all sugar and healthy eating.

## 2020-07-17 NOTE — PHYSICAL EXAM
[Well Nourished] : well nourished [No Acute Distress] : no acute distress [Well Developed] : well developed [Well-Appearing] : well-appearing [Normal Sclera/Conjunctiva] : normal sclera/conjunctiva [PERRL] : pupils equal round and reactive to light [Normal Outer Ear/Nose] : the outer ears and nose were normal in appearance [Normal Oropharynx] : the oropharynx was normal [EOMI] : extraocular movements intact [No JVD] : no jugular venous distention [No Lymphadenopathy] : no lymphadenopathy [Supple] : supple [Thyroid Normal, No Nodules] : the thyroid was normal and there were no nodules present [No Respiratory Distress] : no respiratory distress  [No Accessory Muscle Use] : no accessory muscle use [Clear to Auscultation] : lungs were clear to auscultation bilaterally [Regular Rhythm] : with a regular rhythm [Normal Rate] : normal rate  [Normal S1, S2] : normal S1 and S2 [No Murmur] : no murmur heard [No Carotid Bruits] : no carotid bruits [No Abdominal Bruit] : a ~M bruit was not heard ~T in the abdomen [Pedal Pulses Present] : the pedal pulses are present [No Varicosities] : no varicosities [No Palpable Aorta] : no palpable aorta [No Edema] : there was no peripheral edema [Soft] : abdomen soft [No Extremity Clubbing/Cyanosis] : no extremity clubbing/cyanosis [Non Tender] : non-tender [Non-distended] : non-distended [No HSM] : no HSM [No Masses] : no abdominal mass palpated [Normal Bowel Sounds] : normal bowel sounds [Normal Posterior Cervical Nodes] : no posterior cervical lymphadenopathy [No CVA Tenderness] : no CVA  tenderness [No Spinal Tenderness] : no spinal tenderness [Normal Anterior Cervical Nodes] : no anterior cervical lymphadenopathy [No Joint Swelling] : no joint swelling [Grossly Normal Strength/Tone] : grossly normal strength/tone [Coordination Grossly Intact] : coordination grossly intact [No Rash] : no rash [No Focal Deficits] : no focal deficits [Normal Gait] : normal gait [Deep Tendon Reflexes (DTR)] : deep tendon reflexes were 2+ and symmetric [Normal Affect] : the affect was normal [Normal Insight/Judgement] : insight and judgment were intact

## 2020-07-17 NOTE — ASSESSMENT
[FreeTextEntry1] : shakes- now resolved. Could be viral.  No further symptoms and return if worse.\par HTN- good control\par CAD- follow up with cardiology

## 2020-07-17 NOTE — HISTORY OF PRESENT ILLNESS
[FreeTextEntry1] : Mr. Alvarez is a 74 year-old male with a long history of atherosclerotic heart disease. He had coronary artery bypass graft in 1995. He subsequently had stenting to the saphenous vein graft to the OM1 in 2007 and  2009. In February 2013 the patient was getting a lot of angina and had an abnormal stress test. As of 2013, there was interval closure of the left circumflex stents. The RCA and distal left circumflex were closed, as were seen previously and there were no lesions amenable to PCI. His medications were adjusted; and he has been without angina since that time.\par \par He is feeling well today and his blood pressures are much better controlled. He has resumed his prior BP regimen though is now taking quinapril once/day. I last saw him in 2/2020.\par Since last visit, he is on Weight watchers and lost another 20 lbs.\par Carotid dopplers with mild plaque bilaterally. Because of suboptimal cholesterol, I added zetia to his regimen. His most recent ldl was around 100.\par He denies chest pain, palpitations, lower extremity swelling, orthopnea, PND, dizziness, lightheadedness and near syncope.  \par \par Echo in 5/2018 was unremarkable. Pharm stress with large inferior infarct with mild fatmata-infarct ischemia, which was decided to be medically managed.

## 2020-07-20 LAB
25(OH)D3 SERPL-MCNC: 48.3 NG/ML
ALBUMIN SERPL ELPH-MCNC: 4.1 G/DL
ALP BLD-CCNC: 78 U/L
ALT SERPL-CCNC: 24 U/L
ANION GAP SERPL CALC-SCNC: 13 MMOL/L
APPEARANCE: ABNORMAL
AST SERPL-CCNC: 34 U/L
BACTERIA: NEGATIVE
BASOPHILS # BLD AUTO: 0.05 K/UL
BASOPHILS NFR BLD AUTO: 0.7 %
BILIRUB SERPL-MCNC: 0.4 MG/DL
BILIRUBIN URINE: NEGATIVE
BLOOD URINE: NEGATIVE
BUN SERPL-MCNC: 14 MG/DL
CALCIUM OXALATE CRYSTALS: ABNORMAL
CALCIUM SERPL-MCNC: 9.8 MG/DL
CHLORIDE SERPL-SCNC: 102 MMOL/L
CHOLEST SERPL-MCNC: 111 MG/DL
CHOLEST/HDLC SERPL: 2.4 RATIO
CO2 SERPL-SCNC: 28 MMOL/L
COLOR: YELLOW
CREAT SERPL-MCNC: 1.13 MG/DL
EOSINOPHIL # BLD AUTO: 0.22 K/UL
EOSINOPHIL NFR BLD AUTO: 3.2 %
ESTIMATED AVERAGE GLUCOSE: 114 MG/DL
FOLATE SERPL-MCNC: >20 NG/ML
GLUCOSE QUALITATIVE U: NEGATIVE
GLUCOSE SERPL-MCNC: 95 MG/DL
HBA1C MFR BLD HPLC: 5.6 %
HCT VFR BLD CALC: 46.2 %
HDLC SERPL-MCNC: 47 MG/DL
HGB BLD-MCNC: 13.8 G/DL
HYALINE CASTS: 0 /LPF
IMM GRANULOCYTES NFR BLD AUTO: 0.1 %
KETONES URINE: NEGATIVE
LDLC SERPL CALC-MCNC: 42 MG/DL
LEUKOCYTE ESTERASE URINE: NEGATIVE
LYMPHOCYTES # BLD AUTO: 1.4 K/UL
LYMPHOCYTES NFR BLD AUTO: 20.6 %
MAN DIFF?: NORMAL
MCHC RBC-ENTMCNC: 28.6 PG
MCHC RBC-ENTMCNC: 29.9 GM/DL
MCV RBC AUTO: 95.7 FL
MICROSCOPIC-UA: NORMAL
MONOCYTES # BLD AUTO: 0.63 K/UL
MONOCYTES NFR BLD AUTO: 9.3 %
NEUTROPHILS # BLD AUTO: 4.5 K/UL
NEUTROPHILS NFR BLD AUTO: 66.1 %
NITRITE URINE: NEGATIVE
PH URINE: 5.5
PLATELET # BLD AUTO: 217 K/UL
POTASSIUM SERPL-SCNC: 4.2 MMOL/L
PROT SERPL-MCNC: 7.1 G/DL
PROTEIN URINE: ABNORMAL
RBC # BLD: 4.83 M/UL
RBC # FLD: 16.5 %
RED BLOOD CELLS URINE: 2 /HPF
SODIUM SERPL-SCNC: 143 MMOL/L
SPECIFIC GRAVITY URINE: 1.02
SQUAMOUS EPITHELIAL CELLS: 2 /HPF
TRIGL SERPL-MCNC: 107 MG/DL
TSH SERPL-ACNC: 1.05 UIU/ML
UROBILINOGEN URINE: NORMAL
VIT B12 SERPL-MCNC: 959 PG/ML
WBC # FLD AUTO: 6.81 K/UL
WHITE BLOOD CELLS URINE: 3 /HPF

## 2020-09-01 DIAGNOSIS — Z23 ENCOUNTER FOR IMMUNIZATION: ICD-10-CM

## 2020-12-18 ENCOUNTER — RX RENEWAL (OUTPATIENT)
Age: 75
End: 2020-12-18

## 2020-12-29 ENCOUNTER — RX RENEWAL (OUTPATIENT)
Age: 75
End: 2020-12-29

## 2021-01-13 ENCOUNTER — NON-APPOINTMENT (OUTPATIENT)
Age: 76
End: 2021-01-13

## 2021-01-13 ENCOUNTER — APPOINTMENT (OUTPATIENT)
Dept: CARDIOLOGY | Facility: CLINIC | Age: 76
End: 2021-01-13
Payer: MEDICARE

## 2021-01-13 VITALS
SYSTOLIC BLOOD PRESSURE: 132 MMHG | DIASTOLIC BLOOD PRESSURE: 72 MMHG | WEIGHT: 163 LBS | OXYGEN SATURATION: 99 % | HEART RATE: 52 BPM | HEIGHT: 68 IN | BODY MASS INDEX: 24.71 KG/M2

## 2021-01-13 DIAGNOSIS — I25.10 ATHEROSCLEROTIC HEART DISEASE OF NATIVE CORONARY ARTERY W/OUT ANGINA PECTORIS: ICD-10-CM

## 2021-01-13 DIAGNOSIS — R94.31 ABNORMAL ELECTROCARDIOGRAM [ECG] [EKG]: ICD-10-CM

## 2021-01-13 PROCEDURE — 99214 OFFICE O/P EST MOD 30 MIN: CPT

## 2021-01-13 PROCEDURE — 93000 ELECTROCARDIOGRAM COMPLETE: CPT

## 2021-01-13 NOTE — PHYSICAL EXAM
[General Appearance - Well Developed] : well developed [Normal Appearance] : normal appearance [Well Groomed] : well groomed [General Appearance - Well Nourished] : well nourished [No Deformities] : no deformities [Normal Conjunctiva] : the conjunctiva exhibited no abnormalities [General Appearance - In No Acute Distress] : no acute distress [Eyelids - No Xanthelasma] : the eyelids demonstrated no xanthelasmas [Normal Oral Mucosa] : normal oral mucosa [No Oral Pallor] : no oral pallor [No Oral Cyanosis] : no oral cyanosis [Normal Jugular Venous A Waves Present] : normal jugular venous A waves present [Normal Jugular Venous V Waves Present] : normal jugular venous V waves present [No Jugular Venous Flores A Waves] : no jugular venous flores A waves [Respiration, Rhythm And Depth] : normal respiratory rhythm and effort [Exaggerated Use Of Accessory Muscles For Inspiration] : no accessory muscle use [Auscultation Breath Sounds / Voice Sounds] : lungs were clear to auscultation bilaterally [Abdomen Soft] : soft [Abdomen Tenderness] : non-tender [Abdomen Mass (___ Cm)] : no abdominal mass palpated [Abnormal Walk] : normal gait [Gait - Sufficient For Exercise Testing] : the gait was sufficient for exercise testing [Nail Clubbing] : no clubbing of the fingernails [Cyanosis, Localized] : no localized cyanosis [Petechial Hemorrhages (___cm)] : no petechial hemorrhages [] : no rash [Skin Color & Pigmentation] : normal skin color and pigmentation [No Venous Stasis] : no venous stasis [Skin Lesions] : no skin lesions [No Skin Ulcers] : no skin ulcer [No Xanthoma] : no  xanthoma was observed [Oriented To Time, Place, And Person] : oriented to person, place, and time [Affect] : the affect was normal [Mood] : the mood was normal [No Anxiety] : not feeling anxious [Normal Rate] : normal [Normal S1] : normal S1 [Normal S2] : normal S2 [S3] : no S3 [S4] : no S4 [II] : a grade 2 [Right Carotid Bruit] : no bruit heard over the right carotid [Left Carotid Bruit] : no bruit heard over the left carotid [Right Femoral Bruit] : no bruit heard over the right femoral artery [Left Femoral Bruit] : no bruit heard over the left femoral artery [2+] : left 2+ [No Abnormalities] : the abdominal aorta was not enlarged and no bruit was heard [No Pitting Edema] : no pitting edema present

## 2021-01-13 NOTE — REASON FOR VISIT
[Follow-Up - Clinic] : a clinic follow-up of [Angina Pectoris] : angina pectoris [Aortic Stenosis] : aortic stenosis [Chest Pain] : chest pain [Coronary Artery Disease] : coronary artery disease [Spouse] : spouse

## 2021-01-13 NOTE — REVIEW OF SYSTEMS
[Recent Weight Loss (___ Lbs)] : recent [unfilled] ~Ulb weight loss [see HPI] : see HPI [Blurry Vision] : no blurred vision [Dyspnea on exertion] : dyspnea during exertion [Lower Ext Edema] : no extremity edema [Leg Claudication] : no intermittent leg claudication [Cough] : no cough [Urinary Frequency] : no change in urinary frequency [Hematuria] : no hematuria [Nocturia] : no nocturia [Joint Pain] : joint pain [Joint Stiffness] : joint stiffness [Negative] : Heme/Lymph

## 2021-01-13 NOTE — HISTORY OF PRESENT ILLNESS
[FreeTextEntry1] : Mr. Alvarez is a 75 year-old male with a long history of atherosclerotic heart disease. He had coronary artery bypass graft in 1995. He subsequently had stenting to the saphenous vein graft to the OM1 in 2007 and  2009. In February 2013 the patient was getting a lot of angina and had an abnormal stress test. As of 2013, there was interval closure of the left circumflex stents. The RCA and distal left circumflex were closed, as were seen previously and there were no lesions amenable to PCI. His medications were adjusted; and he has been without angina since that time.\par \par He is feeling well today and his blood pressures are much better controlled. He has resumed his prior BP regimen though is now taking quinapril once/day. I last saw him in 7/2020.\par Since last visit, he is on Weight watchers and lost 40 lbs. \par Carotid dopplers with mild plaque bilaterally. Because of suboptimal cholesterol, I added zetia to his regimen. His most recent ldl was around 100.\par He denies chest pain, palpitations, lower extremity swelling, orthopnea, PND, dizziness, lightheadedness and near syncope.  \par \par Echo in 5/2018 was unremarkable. Pharm stress with large inferior infarct with mild fatmata-infarct ischemia, which was decided to be medically managed.

## 2021-01-13 NOTE — DISCUSSION/SUMMARY
[___ Month(s)] : [unfilled] month(s) [With Me] : with me [FreeTextEntry1] : Mr. Alvarez is a 75 year-old male with a long history of atherosclerotic heart disease and HTN.\par He is status post bypass in 1995, with stents to the SVG to OM1 2007 in 2009.\par \par He is feeling well and his BP is better controlled. He has lost a significant amount of weight on weight watchers.\par His physical exam is unremarkable. There is no evidence of volume overload. His last EKG demonstrated a sinus bradycardia, unchanged from prior EKGs.\par \par I will continue his quinapril 20 mg, along with Toprol , imdur 30 and norvasc 10. \par He will continue taking aspirin and statin for his history of CAD. Carotid sonogram with mild to moderate plaque bilaterally. His most recent LDL was <50 on Lipitor.\par He will have a stress test as part of guidelines to evaluate his residual CAD.\par We discussed the importance of diet, exercise and weight loss in detail.\par \par I will see him again in 4-6 months. He knows to call with any issues or questions.

## 2021-11-29 ENCOUNTER — NON-APPOINTMENT (OUTPATIENT)
Age: 76
End: 2021-11-29

## 2021-11-29 ENCOUNTER — APPOINTMENT (OUTPATIENT)
Dept: CARDIOLOGY | Facility: CLINIC | Age: 76
End: 2021-11-29
Payer: MEDICARE

## 2021-11-29 VITALS
SYSTOLIC BLOOD PRESSURE: 114 MMHG | OXYGEN SATURATION: 96 % | DIASTOLIC BLOOD PRESSURE: 71 MMHG | HEART RATE: 63 BPM | WEIGHT: 163 LBS | BODY MASS INDEX: 24.71 KG/M2 | HEIGHT: 68 IN

## 2021-11-29 PROCEDURE — 99214 OFFICE O/P EST MOD 30 MIN: CPT

## 2021-11-29 PROCEDURE — 93000 ELECTROCARDIOGRAM COMPLETE: CPT

## 2021-11-29 NOTE — HISTORY OF PRESENT ILLNESS
[FreeTextEntry1] : Mr. Alvarez is a 76 year-old male with a long history of atherosclerotic heart disease. He had coronary artery bypass graft in 1995. He subsequently had stenting to the saphenous vein graft to the OM1 in 2007 and  2009. In February 2013 the patient was getting a lot of angina and had an abnormal stress test. As of 2013, there was interval closure of the left circumflex stents. The RCA and distal left circumflex were closed, as were seen previously and there were no lesions amenable to PCI. His medications were adjusted; and he has been without angina since that time.\par \par He is feeling well today and his blood pressures are much better controlled. He has resumed his prior BP regimen though is now taking quinapril once/day. I last saw him in 1/21.\par \par Carotid dopplers with mild plaque bilaterally. Because of suboptimal cholesterol, I added zetia to his regimen. His most recent ldl was 42, in 2020.\par He denies chest pain, palpitations, lower extremity swelling, orthopnea, PND, dizziness, lightheadedness and near syncope.  \par \par Echo in 5/2018 was unremarkable. Pharm stress with large inferior infarct with mild fatmata-infarct ischemia, which was decided to be medically managed.

## 2021-11-29 NOTE — DISCUSSION/SUMMARY
[With Me] : with me [___ Month(s)] : in [unfilled] month(s) [FreeTextEntry1] : Mr. Alvarez is a 76 year-old male with a long history of atherosclerotic heart disease and HTN.\par He is status post bypass in 1995, with stents to the SVG to OM1 2007 in 2009.\par \par He is feeling well and his BP is better controlled. He has lost a significant amount of weight on weight watchers.\par His physical exam is unremarkable. There is no evidence of volume overload. His last EKG demonstrated a sinus bradycardia, unchanged from prior EKGs.\par \par I will continue his quinapril 20 mg, along with Toprol , imdur 30 and norvasc 10. \par He will continue taking aspirin and statin for his history of CAD. Carotid sonogram with mild to moderate plaque bilaterally. His most recent LDL was <50 on Lipitor. We will repeat his carotid doppler and echocardiogram this year. \par \par Depending on this result, I may set him up for a repeat stress test to evaluate his residual CAD.\par We discussed the importance of diet, exercise and weight loss in detail. He will also have a full set of blood work in 2 weeks here.\par \par I will see him again in 4-6 months. He knows to call with any issues or questions.

## 2021-12-13 ENCOUNTER — APPOINTMENT (OUTPATIENT)
Dept: CARDIOLOGY | Facility: CLINIC | Age: 76
End: 2021-12-13
Payer: MEDICARE

## 2021-12-13 PROCEDURE — 93880 EXTRACRANIAL BILAT STUDY: CPT

## 2021-12-13 PROCEDURE — 93306 TTE W/DOPPLER COMPLETE: CPT

## 2021-12-16 LAB
25(OH)D3 SERPL-MCNC: 41.7 NG/ML
ALBUMIN SERPL ELPH-MCNC: 3.8 G/DL
ALP BLD-CCNC: 79 U/L
ALT SERPL-CCNC: 31 U/L
ANION GAP SERPL CALC-SCNC: 12 MMOL/L
APPEARANCE: CLEAR
AST SERPL-CCNC: 34 U/L
BACTERIA: NEGATIVE
BASOPHILS # BLD AUTO: 0.04 K/UL
BASOPHILS NFR BLD AUTO: 0.6 %
BILIRUB SERPL-MCNC: 0.3 MG/DL
BILIRUBIN URINE: NEGATIVE
BLOOD URINE: NEGATIVE
BUN SERPL-MCNC: 21 MG/DL
CALCIUM SERPL-MCNC: 9.4 MG/DL
CHLORIDE SERPL-SCNC: 105 MMOL/L
CHOLEST SERPL-MCNC: 116 MG/DL
CO2 SERPL-SCNC: 25 MMOL/L
COLOR: YELLOW
CREAT SERPL-MCNC: 1.09 MG/DL
EOSINOPHIL # BLD AUTO: 0.19 K/UL
EOSINOPHIL NFR BLD AUTO: 2.7 %
ESTIMATED AVERAGE GLUCOSE: 114 MG/DL
GLUCOSE QUALITATIVE U: NEGATIVE
GLUCOSE SERPL-MCNC: 88 MG/DL
HBA1C MFR BLD HPLC: 5.6 %
HCT VFR BLD CALC: 38.1 %
HDLC SERPL-MCNC: 52 MG/DL
HGB BLD-MCNC: 12.3 G/DL
HYALINE CASTS: 1 /LPF
IMM GRANULOCYTES NFR BLD AUTO: 0.1 %
KETONES URINE: NEGATIVE
LDLC SERPL CALC-MCNC: 49 MG/DL
LEUKOCYTE ESTERASE URINE: NEGATIVE
LYMPHOCYTES # BLD AUTO: 1.72 K/UL
LYMPHOCYTES NFR BLD AUTO: 24.6 %
MAN DIFF?: NORMAL
MCHC RBC-ENTMCNC: 29.9 PG
MCHC RBC-ENTMCNC: 32.3 GM/DL
MCV RBC AUTO: 92.7 FL
MICROSCOPIC-UA: NORMAL
MONOCYTES # BLD AUTO: 0.57 K/UL
MONOCYTES NFR BLD AUTO: 8.1 %
NEUTROPHILS # BLD AUTO: 4.47 K/UL
NEUTROPHILS NFR BLD AUTO: 63.9 %
NITRITE URINE: NEGATIVE
NONHDLC SERPL-MCNC: 65 MG/DL
PH URINE: 5.5
PLATELET # BLD AUTO: 214 K/UL
POTASSIUM SERPL-SCNC: 4.3 MMOL/L
PROT SERPL-MCNC: 6.8 G/DL
PROTEIN URINE: ABNORMAL
RBC # BLD: 4.11 M/UL
RBC # FLD: 15.8 %
RED BLOOD CELLS URINE: 1 /HPF
SODIUM SERPL-SCNC: 142 MMOL/L
SPECIFIC GRAVITY URINE: 1.02
SQUAMOUS EPITHELIAL CELLS: 1 /HPF
TRIGL SERPL-MCNC: 79 MG/DL
TSH SERPL-ACNC: 1.45 UIU/ML
UROBILINOGEN URINE: NORMAL
WBC # FLD AUTO: 7 K/UL
WHITE BLOOD CELLS URINE: 3 /HPF

## 2022-01-05 ENCOUNTER — APPOINTMENT (OUTPATIENT)
Dept: CARDIOLOGY | Facility: CLINIC | Age: 77
End: 2022-01-05

## 2022-10-06 ENCOUNTER — APPOINTMENT (OUTPATIENT)
Dept: CARDIOLOGY | Facility: CLINIC | Age: 77
End: 2022-10-06

## 2022-10-06 ENCOUNTER — NON-APPOINTMENT (OUTPATIENT)
Age: 77
End: 2022-10-06

## 2022-10-06 VITALS
HEIGHT: 68 IN | DIASTOLIC BLOOD PRESSURE: 69 MMHG | OXYGEN SATURATION: 98 % | HEART RATE: 63 BPM | WEIGHT: 162 LBS | SYSTOLIC BLOOD PRESSURE: 111 MMHG | BODY MASS INDEX: 24.55 KG/M2

## 2022-10-06 DIAGNOSIS — I65.29 OCCLUSION AND STENOSIS OF UNSPECIFIED CAROTID ARTERY: ICD-10-CM

## 2022-10-06 DIAGNOSIS — R07.89 OTHER CHEST PAIN: ICD-10-CM

## 2022-10-06 PROCEDURE — 99214 OFFICE O/P EST MOD 30 MIN: CPT

## 2022-10-06 PROCEDURE — 93000 ELECTROCARDIOGRAM COMPLETE: CPT

## 2022-10-06 NOTE — DISCUSSION/SUMMARY
[With Me] : with me [___ Month(s)] : in [unfilled] month(s) [FreeTextEntry1] : Mr. Alvarez is a 76 year-old male with a long history of atherosclerotic heart disease and HTN.\par He is status post bypass in 1995, with stents to the SVG to OM1 2007 in 2009.\par \par He is feeling well and his BP is well controlled.\par His physical exam is unremarkable other than a left carotid bruit. There is no evidence of volume overload. \par \par I will continue his quinapril 20 mg, along with Toprol , imdur 30 and norvasc 10. \par He will continue taking aspirin and statin for his history of CAD.  His most recent LDL is at goal, though we will need to repeat blood work this year.  He will eventually need a repeat stress test.\par \par A carotid Doppler demonstrated possible severe left-sided ICA stenosis, and he is going to have a CTA of his neck to confirm.  He will then be referred to vascular surgery.\par \par We discussed the importance of diet, exercise and weight loss in detail. He will also have a full set of blood work in 2 weeks here.\par \par We will speak after the above testing, and arrange follow-up. [EKG obtained to assist in diagnosis and management of assessed problem(s)] : EKG obtained to assist in diagnosis and management of assessed problem(s)

## 2022-10-06 NOTE — PHYSICAL EXAM
[Well Developed] : well developed [Well Nourished] : well nourished [No Acute Distress] : no acute distress [Normal Conjunctiva] : normal conjunctiva [Normal Venous Pressure] : normal venous pressure [Normal S1, S2] : normal S1, S2 [No Murmur] : no murmur [No Rub] : no rub [No Gallop] : no gallop [Clear Lung Fields] : clear lung fields [Good Air Entry] : good air entry [No Respiratory Distress] : no respiratory distress  [Soft] : abdomen soft [Non Tender] : non-tender [No Masses/organomegaly] : no masses/organomegaly [Normal Bowel Sounds] : normal bowel sounds [Normal Gait] : normal gait [No Edema] : no edema [No Cyanosis] : no cyanosis [No Clubbing] : no clubbing [No Varicosities] : no varicosities [No Rash] : no rash [No Skin Lesions] : no skin lesions [Moves all extremities] : moves all extremities [No Focal Deficits] : no focal deficits [Normal Speech] : normal speech [Alert and Oriented] : alert and oriented [Normal memory] : normal memory [de-identified] : + left carotid bruit

## 2022-10-06 NOTE — HISTORY OF PRESENT ILLNESS
[FreeTextEntry1] : Mr. Alvarez is a 76 year-old male with a long history of atherosclerotic heart disease. He had coronary artery bypass graft in 1995. He subsequently had stenting to the saphenous vein graft to the OM1 in 2007 and  2009. In February 2013 the patient was getting a lot of angina and had an abnormal stress test. As of 2013, there was interval closure of the left circumflex stents. The RCA and distal left circumflex were closed, as were seen previously and there were no lesions amenable to PCI. His medications were adjusted; and he has been without angina since that time.\par \par I last saw him in 11/21.\par \par He is feeling well today and his blood pressures are much better controlled. He has resumed his prior BP regimen though is now taking quinapril once/day. \par Carotid dopplers with mild plaque bilaterally in 2019. Because of suboptimal cholesterol, I added zetia to his regimen. His most recent ldl was 49 in 2021.\par I repeated his carotids in 2021, which showed severe Left ICA stenosis.  He was recommended to have additional imaging, though did not follow through.\par He denies chest pain, palpitations, lower extremity swelling, orthopnea, PND, dizziness, lightheadedness and near syncope.  \par \par Echo in 5/2018 was unremarkable, and unchanged in 2021 (mild MR, normal LV function, mod LA dilatation). Pharm stress with large inferior infarct with mild fatmata-infarct ischemia, which was decided to be medically managed.

## 2022-10-07 RX ORDER — EZETIMIBE 10 MG/1
10 TABLET ORAL
Qty: 90 | Refills: 3 | Status: ACTIVE | COMMUNITY
Start: 2018-12-13

## 2022-10-07 RX ORDER — ASPIRIN ENTERIC COATED TABLETS 81 MG 81 MG/1
81 TABLET, DELAYED RELEASE ORAL
Qty: 90 | Refills: 3 | Status: ACTIVE | COMMUNITY
Start: 2022-10-07

## 2022-10-08 ENCOUNTER — APPOINTMENT (OUTPATIENT)
Dept: CT IMAGING | Facility: CLINIC | Age: 77
End: 2022-10-08

## 2022-10-08 PROCEDURE — 70498 CT ANGIOGRAPHY NECK: CPT

## 2022-10-19 ENCOUNTER — APPOINTMENT (OUTPATIENT)
Dept: VASCULAR SURGERY | Facility: CLINIC | Age: 77
End: 2022-10-19

## 2022-10-19 VITALS
WEIGHT: 164 LBS | HEART RATE: 76 BPM | BODY MASS INDEX: 25.74 KG/M2 | TEMPERATURE: 97.6 F | RESPIRATION RATE: 14 BRPM | HEIGHT: 67 IN | DIASTOLIC BLOOD PRESSURE: 70 MMHG | SYSTOLIC BLOOD PRESSURE: 126 MMHG | OXYGEN SATURATION: 97 %

## 2022-10-19 PROCEDURE — 99202 OFFICE O/P NEW SF 15 MIN: CPT

## 2022-10-19 NOTE — PHYSICAL EXAM
[Carotid Bruits] : carotid bruit  [Normal Breath Sounds] : Normal breath sounds [Normal Heart Sounds] : normal heart sounds [2+] : left 2+ [Alert] : alert [Oriented to Person] : oriented to person [Oriented to Place] : oriented to place [Oriented to Time] : oriented to time [JVD] : no jugular venous distention  [Ankle Swelling (On Exam)] : not present [de-identified] : JACK BARRIGA in NAD [de-identified] : PERRL [de-identified] : CN II-XII intact

## 2022-10-19 NOTE — PHYSICAL EXAM
[Carotid Bruits] : carotid bruit  [Normal Breath Sounds] : Normal breath sounds [Normal Heart Sounds] : normal heart sounds [2+] : left 2+ [Alert] : alert [Oriented to Person] : oriented to person [Oriented to Place] : oriented to place [Oriented to Time] : oriented to time [JVD] : no jugular venous distention  [Ankle Swelling (On Exam)] : not present [de-identified] : JACK BARRIGA in NAD [de-identified] : PERRL [de-identified] : CN II-XII intact

## 2022-10-19 NOTE — HISTORY OF PRESENT ILLNESS
[FreeTextEntry1] : Mr. Alvarez is a 76 year-old smoker with a long history of atherosclerotic heart disease. He had coronary artery bypass graft in 1995. He subsequently had stenting to the saphenous vein graft to the OM1 in 2007 and 2009. In February 2013 the patient was getting a lot of angina and had an abnormal stress test. As of 2013, there was interval closure of the left circumflex stents. The RCA and distal left circumflex were closed, as were seen previously and there were no lesions amenable to PCI. He follows with Dr Schultz and was noted to have a left carotid bruit. Acarotid US was performed and revealed high grade left carotid stenosis. A CTA neck recently performed confirmed focal area of stenosis in L ICA.\par \par He denies any visual disturbances, speech difficulties, or weakness.  He continues to smoke. He remains active

## 2022-12-23 ENCOUNTER — APPOINTMENT (OUTPATIENT)
Dept: CARDIOLOGY | Facility: CLINIC | Age: 77
End: 2022-12-23

## 2022-12-23 ENCOUNTER — NON-APPOINTMENT (OUTPATIENT)
Age: 77
End: 2022-12-23

## 2022-12-23 VITALS
WEIGHT: 162 LBS | DIASTOLIC BLOOD PRESSURE: 74 MMHG | OXYGEN SATURATION: 96 % | SYSTOLIC BLOOD PRESSURE: 121 MMHG | BODY MASS INDEX: 25.43 KG/M2 | HEIGHT: 67 IN | HEART RATE: 72 BPM

## 2022-12-23 DIAGNOSIS — I10 ESSENTIAL (PRIMARY) HYPERTENSION: ICD-10-CM

## 2022-12-23 PROCEDURE — 93000 ELECTROCARDIOGRAM COMPLETE: CPT

## 2022-12-23 PROCEDURE — 99214 OFFICE O/P EST MOD 30 MIN: CPT

## 2022-12-23 NOTE — PHYSICAL EXAM
[Well Developed] : well developed [Well Nourished] : well nourished [No Acute Distress] : no acute distress [Normal Conjunctiva] : normal conjunctiva [Normal Venous Pressure] : normal venous pressure [Normal S1, S2] : normal S1, S2 [No Murmur] : no murmur [No Rub] : no rub [No Gallop] : no gallop [Clear Lung Fields] : clear lung fields [Good Air Entry] : good air entry [No Respiratory Distress] : no respiratory distress  [Soft] : abdomen soft [Non Tender] : non-tender [No Masses/organomegaly] : no masses/organomegaly [Normal Bowel Sounds] : normal bowel sounds [Normal Gait] : normal gait [No Edema] : no edema [No Cyanosis] : no cyanosis [No Clubbing] : no clubbing [No Varicosities] : no varicosities [No Rash] : no rash [No Skin Lesions] : no skin lesions [Moves all extremities] : moves all extremities [No Focal Deficits] : no focal deficits [Normal Speech] : normal speech [Alert and Oriented] : alert and oriented [Normal memory] : normal memory [de-identified] : + left carotid bruit

## 2022-12-23 NOTE — DISCUSSION/SUMMARY
[With Me] : with me [___ Month(s)] : in [unfilled] month(s) [FreeTextEntry1] : Mr. Alvarez is a 77 year-old male with a long history of atherosclerotic heart disease and HTN.\par He is status post bypass in 1995, with stents to the SVG to OM1 2007 in 2009.\par \par He is feeling well from a cardiovascular standpoint and his BP is well controlled.His physical exam is unremarkable other than a left carotid bruit. There is no evidence of volume overload. \par \par I will continue his quinapril 20 mg, along with Toprol , imdur 30 and norvasc 10. \par He will continue taking aspirin and statin for his history of CAD.  His most recent LDL is at goal.\par \par He has severe left-sided ICA disease, and needs a CEA.  As part of his preoperative clearance, I am setting him up for a pharmacologic nuclear stress test.  He will also have a full set of blood work, as his only symptom recently has been night sweats.\par We will speak after the above testing, proceed with clearance, and arrange follow-up. [EKG obtained to assist in diagnosis and management of assessed problem(s)] : EKG obtained to assist in diagnosis and management of assessed problem(s)

## 2022-12-23 NOTE — HISTORY OF PRESENT ILLNESS
[FreeTextEntry1] : Mr. Alvarez is a 77 year-old male with a long history of atherosclerotic heart disease. He had coronary artery bypass graft in 1995. He subsequently had stenting to the saphenous vein graft to the OM1 in 2007 and  2009. In February 2013 the patient was getting a lot of angina and had an abnormal stress test. As of 2013, there was interval closure of the left circumflex stents. The RCA and distal left circumflex were closed, as were seen previously and there were no lesions amenable to PCI. His medications were adjusted; and he has been without angina since that time.\par \par I last saw him in 10/22.\par \par He is feeling well today and his blood pressures are much better controlled. He has resumed his prior BP regimen though is now taking quinapril once/day. \par I repeated his carotids in 2021, which showed severe Left ICA stenosis. \par \par Echo in 5/2018 was unremarkable, and unchanged in 2021 (mild MR, normal LV function, mod LA dilatation). Pharm stress with large inferior infarct with mild fatmata-infarct ischemia, which was decided to be medically managed.\par \par He is planning on having left CEA. His main complaints today are being cold, and night sweats. He denies chest pain, palpitations, lower extremity swelling, orthopnea, PND, dizziness, lightheadedness and near syncope.  He may have lost a pound or 2.\par

## 2022-12-28 LAB
ALBUMIN SERPL ELPH-MCNC: 3.3 G/DL
ALP BLD-CCNC: 108 U/L
ALT SERPL-CCNC: 90 U/L
ANION GAP SERPL CALC-SCNC: 12 MMOL/L
APPEARANCE: CLEAR
APTT BLD: 33.3 SEC
AST SERPL-CCNC: 69 U/L
BACTERIA: NEGATIVE
BASOPHILS # BLD AUTO: 0.05 K/UL
BASOPHILS NFR BLD AUTO: 0.6 %
BILIRUB SERPL-MCNC: 0.3 MG/DL
BILIRUBIN URINE: NEGATIVE
BLOOD URINE: NEGATIVE
BUN SERPL-MCNC: 13 MG/DL
CALCIUM SERPL-MCNC: 9.4 MG/DL
CHLORIDE SERPL-SCNC: 101 MMOL/L
CHOLEST SERPL-MCNC: 107 MG/DL
CO2 SERPL-SCNC: 27 MMOL/L
COLOR: NORMAL
CREAT SERPL-MCNC: 1.04 MG/DL
EGFR: 74 ML/MIN/1.73M2
EOSINOPHIL # BLD AUTO: 0.14 K/UL
EOSINOPHIL NFR BLD AUTO: 1.6 %
ESTIMATED AVERAGE GLUCOSE: 126 MG/DL
GLUCOSE QUALITATIVE U: NEGATIVE
GLUCOSE SERPL-MCNC: 101 MG/DL
HBA1C MFR BLD HPLC: 6 %
HCT VFR BLD CALC: 32.9 %
HDLC SERPL-MCNC: 48 MG/DL
HGB BLD-MCNC: 9.9 G/DL
HYALINE CASTS: 4 /LPF
IMM GRANULOCYTES NFR BLD AUTO: 0.5 %
INR PPP: 1.24 RATIO
KETONES URINE: NEGATIVE
LDLC SERPL CALC-MCNC: 44 MG/DL
LEUKOCYTE ESTERASE URINE: NEGATIVE
LYMPHOCYTES # BLD AUTO: 1.33 K/UL
LYMPHOCYTES NFR BLD AUTO: 15.6 %
MAN DIFF?: NORMAL
MCHC RBC-ENTMCNC: 25 PG
MCHC RBC-ENTMCNC: 30.1 GM/DL
MCV RBC AUTO: 83.1 FL
MICROSCOPIC-UA: NORMAL
MONOCYTES # BLD AUTO: 0.57 K/UL
MONOCYTES NFR BLD AUTO: 6.7 %
NEUTROPHILS # BLD AUTO: 6.38 K/UL
NEUTROPHILS NFR BLD AUTO: 75 %
NITRITE URINE: NEGATIVE
NONHDLC SERPL-MCNC: 59 MG/DL
PH URINE: 7
PLATELET # BLD AUTO: 472 K/UL
POTASSIUM SERPL-SCNC: 4.9 MMOL/L
PROT SERPL-MCNC: 7.3 G/DL
PROTEIN URINE: ABNORMAL
PT BLD: 14.4 SEC
RBC # BLD: 3.96 M/UL
RBC # FLD: 17.2 %
RED BLOOD CELLS URINE: 4 /HPF
SODIUM SERPL-SCNC: 140 MMOL/L
SPECIFIC GRAVITY URINE: 1.01
SQUAMOUS EPITHELIAL CELLS: 0 /HPF
TRIGL SERPL-MCNC: 75 MG/DL
TSH SERPL-ACNC: 0.67 UIU/ML
UROBILINOGEN URINE: NORMAL
WBC # FLD AUTO: 8.51 K/UL
WHITE BLOOD CELLS URINE: 1 /HPF

## 2022-12-29 ENCOUNTER — OUTPATIENT (OUTPATIENT)
Dept: OUTPATIENT SERVICES | Facility: HOSPITAL | Age: 77
LOS: 1 days | End: 2022-12-29
Payer: MEDICARE

## 2022-12-29 ENCOUNTER — APPOINTMENT (OUTPATIENT)
Dept: INTERNAL MEDICINE | Facility: CLINIC | Age: 77
End: 2022-12-29
Payer: MEDICARE

## 2022-12-29 ENCOUNTER — APPOINTMENT (OUTPATIENT)
Dept: RADIOLOGY | Facility: CLINIC | Age: 77
End: 2022-12-29
Payer: MEDICARE

## 2022-12-29 VITALS
SYSTOLIC BLOOD PRESSURE: 120 MMHG | RESPIRATION RATE: 14 BRPM | BODY MASS INDEX: 25.43 KG/M2 | DIASTOLIC BLOOD PRESSURE: 76 MMHG | HEIGHT: 67 IN | OXYGEN SATURATION: 98 % | TEMPERATURE: 98.2 F | WEIGHT: 162 LBS | HEART RATE: 76 BPM

## 2022-12-29 DIAGNOSIS — R61 GENERALIZED HYPERHIDROSIS: ICD-10-CM

## 2022-12-29 DIAGNOSIS — D64.9 ANEMIA, UNSPECIFIED: ICD-10-CM

## 2022-12-29 PROCEDURE — 99214 OFFICE O/P EST MOD 30 MIN: CPT

## 2022-12-29 PROCEDURE — 71046 X-RAY EXAM CHEST 2 VIEWS: CPT

## 2022-12-29 PROCEDURE — 71046 X-RAY EXAM CHEST 2 VIEWS: CPT | Mod: 26

## 2022-12-29 NOTE — REVIEW OF SYSTEMS
[Night Sweats] : night sweats [Fever] : no fever [Chills] : no chills [Chest Pain] : no chest pain [Palpitations] : no palpitations [Lower Ext Edema] : no lower extremity edema [Shortness Of Breath] : no shortness of breath [Wheezing] : no wheezing [Cough] : no cough [Dyspnea on Exertion] : not dyspnea on exertion [Abdominal Pain] : no abdominal pain [Nausea] : no nausea [Constipation] : no constipation [Diarrhea] : no diarrhea [Vomiting] : no vomiting [Dysuria] : no dysuria

## 2022-12-29 NOTE — ASSESSMENT
[FreeTextEntry1] : Anemia, night sweats: Referred to Dr Doe/Dr Winter for colon ca screening given anemia. Will check spep, upep, CXR, quant gold to help rule out myeloma, lung mass, TB. Will discuss results. \par

## 2023-01-03 ENCOUNTER — TRANSCRIPTION ENCOUNTER (OUTPATIENT)
Age: 78
End: 2023-01-03

## 2023-01-03 LAB
ALBUMIN MFR SERPL ELPH: 32.2 %
ALBUMIN SERPL-MCNC: 2.3 G/DL
ALBUMIN/GLOB SERPL: 0.5 RATIO
ALBUPE: 27 %
ALPHA1 GLOB MFR SERPL ELPH: 10.1 %
ALPHA1 GLOB SERPL ELPH-MCNC: 0.7 G/DL
ALPHA1UPE: 35 %
ALPHA2 GLOB MFR SERPL ELPH: 21 %
ALPHA2 GLOB SERPL ELPH-MCNC: 1.5 G/DL
ALPHA2UPE: 13.9 %
B-GLOBULIN MFR SERPL ELPH: 15.6 %
B-GLOBULIN SERPL ELPH-MCNC: 1.1 G/DL
BETAUPE: 12.3 %
DEPRECATED KAPPA LC FREE/LAMBDA SER: 0.62 RATIO
FERRITIN SERPL-MCNC: 695 NG/ML
FOLATE SERPL-MCNC: >20 NG/ML
FREE KAPPA URINE: 283.35 MG/L
FREE KAPPA/LAMDA RATIO: 3.72 RATIO
FREE LAMDA URINE: 76.21 MG/L
GAMMA GLOB FLD ELPH-MCNC: 1.5 G/DL
GAMMA GLOB MFR SERPL ELPH: 21.1 %
GAMMAUPE: 11.8 %
IGA 24H UR QL IFE: NORMAL
IGA SER QL IEP: 472 MG/DL
IGG SER QL IEP: 1704 MG/DL
IGM SER QL IEP: 84 MG/DL
INTERPRETATION SERPL IEP-IMP: NORMAL
IRON SATN MFR SERPL: 9 %
IRON SERPL-MCNC: 24 UG/DL
KAPPA LC 24H UR QL: PRESENT
KAPPA LC CSF-MCNC: 16.35 MG/DL
KAPPA LC SERPL-MCNC: 10.1 MG/DL
M PROTEIN MFR SERPL ELPH: 12.1 %
M PROTEIN SPEC IFE-MCNC: NORMAL
M SPIKE RU: 2.9 %
M TB IFN-G BLD-IMP: ABNORMAL
MONOCLON BAND OBS SERPL: 0.9 G/DL
PROT PATTERN 24H UR ELPH-IMP: NORMAL
PROT SERPL-MCNC: 7.1 G/DL
PROT SERPL-MCNC: 7.1 G/DL
PROT UR-MCNC: 47 MG/DL
PROT UR-MCNC: 47 MG/DL
QUANTIFERON TB PLUS MITOGEN MINUS NIL: 0.19 IU/ML
QUANTIFERON TB PLUS NIL: 0.03 IU/ML
QUANTIFERON TB PLUS TB1 MINUS NIL: 0 IU/ML
QUANTIFERON TB PLUS TB2 MINUS NIL: 0 IU/ML
TIBC SERPL-MCNC: 255 UG/DL
UIBC SERPL-MCNC: 231 UG/DL
VIT B12 SERPL-MCNC: 801 PG/ML

## 2023-01-04 ENCOUNTER — INPATIENT (INPATIENT)
Facility: HOSPITAL | Age: 78
LOS: 5 days | Discharge: HOME | DRG: 281 | End: 2023-01-10
Attending: FAMILY MEDICINE | Admitting: INTERNAL MEDICINE
Payer: MEDICARE

## 2023-01-04 ENCOUNTER — APPOINTMENT (OUTPATIENT)
Dept: CARDIOLOGY | Facility: CLINIC | Age: 78
End: 2023-01-04

## 2023-01-04 VITALS
SYSTOLIC BLOOD PRESSURE: 134 MMHG | HEART RATE: 119 BPM | WEIGHT: 164.91 LBS | TEMPERATURE: 99 F | HEIGHT: 67 IN | OXYGEN SATURATION: 99 % | RESPIRATION RATE: 16 BRPM | DIASTOLIC BLOOD PRESSURE: 84 MMHG

## 2023-01-04 DIAGNOSIS — D64.9 ANEMIA, UNSPECIFIED: ICD-10-CM

## 2023-01-04 DIAGNOSIS — N28.89 OTHER SPECIFIED DISORDERS OF KIDNEY AND URETER: ICD-10-CM

## 2023-01-04 DIAGNOSIS — Z29.9 ENCOUNTER FOR PROPHYLACTIC MEASURES, UNSPECIFIED: ICD-10-CM

## 2023-01-04 DIAGNOSIS — Z95.5 PRESENCE OF CORONARY ANGIOPLASTY IMPLANT AND GRAFT: Chronic | ICD-10-CM

## 2023-01-04 DIAGNOSIS — Z95.1 PRESENCE OF AORTOCORONARY BYPASS GRAFT: Chronic | ICD-10-CM

## 2023-01-04 DIAGNOSIS — I25.10 ATHEROSCLEROTIC HEART DISEASE OF NATIVE CORONARY ARTERY WITHOUT ANGINA PECTORIS: ICD-10-CM

## 2023-01-04 DIAGNOSIS — R07.9 CHEST PAIN, UNSPECIFIED: ICD-10-CM

## 2023-01-04 DIAGNOSIS — E78.5 HYPERLIPIDEMIA, UNSPECIFIED: ICD-10-CM

## 2023-01-04 DIAGNOSIS — F17.200 NICOTINE DEPENDENCE, UNSPECIFIED, UNCOMPLICATED: ICD-10-CM

## 2023-01-04 DIAGNOSIS — I10 ESSENTIAL (PRIMARY) HYPERTENSION: ICD-10-CM

## 2023-01-04 LAB
ALBUMIN SERPL ELPH-MCNC: 2 G/DL — LOW (ref 3.3–5)
ALP SERPL-CCNC: 147 U/L — HIGH (ref 40–120)
ALT FLD-CCNC: 137 U/L — HIGH (ref 12–78)
ANION GAP SERPL CALC-SCNC: 8 MMOL/L — SIGNIFICANT CHANGE UP (ref 5–17)
APPEARANCE UR: CLEAR — SIGNIFICANT CHANGE UP
APTT BLD: 30.8 SEC — SIGNIFICANT CHANGE UP (ref 27.5–35.5)
AST SERPL-CCNC: 115 U/L — HIGH (ref 15–37)
BASOPHILS # BLD AUTO: 0.03 K/UL — SIGNIFICANT CHANGE UP (ref 0–0.2)
BASOPHILS NFR BLD AUTO: 0.4 % — SIGNIFICANT CHANGE UP (ref 0–2)
BILIRUB SERPL-MCNC: 0.2 MG/DL — SIGNIFICANT CHANGE UP (ref 0.2–1.2)
BILIRUB UR-MCNC: NEGATIVE — SIGNIFICANT CHANGE UP
BUN SERPL-MCNC: 23 MG/DL — SIGNIFICANT CHANGE UP (ref 7–23)
CALCIUM SERPL-MCNC: 9.1 MG/DL — SIGNIFICANT CHANGE UP (ref 8.5–10.1)
CHLORIDE SERPL-SCNC: 105 MMOL/L — SIGNIFICANT CHANGE UP (ref 96–108)
CK MB CFR SERPL CALC: 5.7 NG/ML — HIGH (ref 0–3.6)
CO2 SERPL-SCNC: 25 MMOL/L — SIGNIFICANT CHANGE UP (ref 22–31)
COLOR SPEC: YELLOW — SIGNIFICANT CHANGE UP
CREAT SERPL-MCNC: 1 MG/DL — SIGNIFICANT CHANGE UP (ref 0.5–1.3)
DIFF PNL FLD: ABNORMAL
EGFR: 78 ML/MIN/1.73M2 — SIGNIFICANT CHANGE UP
EOSINOPHIL # BLD AUTO: 0.12 K/UL — SIGNIFICANT CHANGE UP (ref 0–0.5)
EOSINOPHIL NFR BLD AUTO: 1.5 % — SIGNIFICANT CHANGE UP (ref 0–6)
GLUCOSE SERPL-MCNC: 114 MG/DL — HIGH (ref 70–99)
GLUCOSE UR QL: NEGATIVE — SIGNIFICANT CHANGE UP
HCT VFR BLD CALC: 28.3 % — LOW (ref 39–50)
HGB BLD-MCNC: 8.6 G/DL — LOW (ref 13–17)
IMM GRANULOCYTES NFR BLD AUTO: 0.5 % — SIGNIFICANT CHANGE UP (ref 0–0.9)
INR BLD: 1.25 RATIO — HIGH (ref 0.88–1.16)
KETONES UR-MCNC: NEGATIVE — SIGNIFICANT CHANGE UP
LEUKOCYTE ESTERASE UR-ACNC: NEGATIVE — SIGNIFICANT CHANGE UP
LIDOCAIN IGE QN: 328 U/L — SIGNIFICANT CHANGE UP (ref 73–393)
LYMPHOCYTES # BLD AUTO: 0.94 K/UL — LOW (ref 1–3.3)
LYMPHOCYTES # BLD AUTO: 11.8 % — LOW (ref 13–44)
MCHC RBC-ENTMCNC: 24.7 PG — LOW (ref 27–34)
MCHC RBC-ENTMCNC: 30.4 GM/DL — LOW (ref 32–36)
MCV RBC AUTO: 81.3 FL — SIGNIFICANT CHANGE UP (ref 80–100)
MONOCYTES # BLD AUTO: 0.51 K/UL — SIGNIFICANT CHANGE UP (ref 0–0.9)
MONOCYTES NFR BLD AUTO: 6.4 % — SIGNIFICANT CHANGE UP (ref 2–14)
NEUTROPHILS # BLD AUTO: 6.32 K/UL — SIGNIFICANT CHANGE UP (ref 1.8–7.4)
NEUTROPHILS NFR BLD AUTO: 79.4 % — HIGH (ref 43–77)
NITRITE UR-MCNC: NEGATIVE — SIGNIFICANT CHANGE UP
NRBC # BLD: 0 /100 WBCS — SIGNIFICANT CHANGE UP (ref 0–0)
NT-PROBNP SERPL-SCNC: 527 PG/ML — HIGH (ref 0–450)
PH UR: 5 — SIGNIFICANT CHANGE UP (ref 5–8)
PLATELET # BLD AUTO: 425 K/UL — HIGH (ref 150–400)
POTASSIUM SERPL-MCNC: 3.9 MMOL/L — SIGNIFICANT CHANGE UP (ref 3.5–5.3)
POTASSIUM SERPL-SCNC: 3.9 MMOL/L — SIGNIFICANT CHANGE UP (ref 3.5–5.3)
PROT SERPL-MCNC: 8.4 G/DL — HIGH (ref 6–8.3)
PROT UR-MCNC: 100
PROTHROM AB SERPL-ACNC: 14.7 SEC — HIGH (ref 10.5–13.4)
RBC # BLD: 3.48 M/UL — LOW (ref 4.2–5.8)
RBC # FLD: 17.4 % — HIGH (ref 10.3–14.5)
SARS-COV-2 RNA SPEC QL NAA+PROBE: SIGNIFICANT CHANGE UP
SODIUM SERPL-SCNC: 138 MMOL/L — SIGNIFICANT CHANGE UP (ref 135–145)
SP GR SPEC: 1.01 — SIGNIFICANT CHANGE UP (ref 1.01–1.02)
TROPONIN I, HIGH SENSITIVITY RESULT: 2005.2 NG/L — HIGH
TROPONIN I, HIGH SENSITIVITY RESULT: 788.3 NG/L — HIGH
TROPONIN I, HIGH SENSITIVITY RESULT: 88.3 NG/L — HIGH
UROBILINOGEN FLD QL: NEGATIVE — SIGNIFICANT CHANGE UP
WBC # BLD: 7.96 K/UL — SIGNIFICANT CHANGE UP (ref 3.8–10.5)
WBC # FLD AUTO: 7.96 K/UL — SIGNIFICANT CHANGE UP (ref 3.8–10.5)

## 2023-01-04 PROCEDURE — 93010 ELECTROCARDIOGRAM REPORT: CPT

## 2023-01-04 PROCEDURE — 99223 1ST HOSP IP/OBS HIGH 75: CPT

## 2023-01-04 PROCEDURE — 71045 X-RAY EXAM CHEST 1 VIEW: CPT | Mod: 26

## 2023-01-04 PROCEDURE — 99285 EMERGENCY DEPT VISIT HI MDM: CPT | Mod: CS

## 2023-01-04 PROCEDURE — 70470 CT HEAD/BRAIN W/O & W/DYE: CPT | Mod: 26

## 2023-01-04 PROCEDURE — 71275 CT ANGIOGRAPHY CHEST: CPT | Mod: 26

## 2023-01-04 PROCEDURE — 74177 CT ABD & PELVIS W/CONTRAST: CPT | Mod: 26

## 2023-01-04 RX ORDER — ISOSORBIDE MONONITRATE 60 MG/1
30 TABLET, EXTENDED RELEASE ORAL DAILY
Refills: 0 | Status: DISCONTINUED | OUTPATIENT
Start: 2023-01-04 | End: 2023-01-10

## 2023-01-04 RX ORDER — LISINOPRIL 2.5 MG/1
20 TABLET ORAL DAILY
Refills: 0 | Status: DISCONTINUED | OUTPATIENT
Start: 2023-01-04 | End: 2023-01-10

## 2023-01-04 RX ORDER — HEPARIN SODIUM 5000 [USP'U]/ML
INJECTION INTRAVENOUS; SUBCUTANEOUS
Qty: 25000 | Refills: 0 | Status: DISCONTINUED | OUTPATIENT
Start: 2023-01-04 | End: 2023-01-04

## 2023-01-04 RX ORDER — CLOPIDOGREL BISULFATE 75 MG/1
75 TABLET, FILM COATED ORAL DAILY
Refills: 0 | Status: DISCONTINUED | OUTPATIENT
Start: 2023-01-04 | End: 2023-01-07

## 2023-01-04 RX ORDER — HEPARIN SODIUM 5000 [USP'U]/ML
4400 INJECTION INTRAVENOUS; SUBCUTANEOUS EVERY 6 HOURS
Refills: 0 | Status: DISCONTINUED | OUTPATIENT
Start: 2023-01-04 | End: 2023-01-07

## 2023-01-04 RX ORDER — AMLODIPINE BESYLATE 2.5 MG/1
10 TABLET ORAL DAILY
Refills: 0 | Status: DISCONTINUED | OUTPATIENT
Start: 2023-01-04 | End: 2023-01-10

## 2023-01-04 RX ORDER — HEPARIN SODIUM 5000 [USP'U]/ML
INJECTION INTRAVENOUS; SUBCUTANEOUS
Qty: 25000 | Refills: 0 | Status: DISCONTINUED | OUTPATIENT
Start: 2023-01-04 | End: 2023-01-07

## 2023-01-04 RX ORDER — INFLUENZA VIRUS VACCINE 15; 15; 15; 15 UG/.5ML; UG/.5ML; UG/.5ML; UG/.5ML
0.7 SUSPENSION INTRAMUSCULAR ONCE
Refills: 0 | Status: DISCONTINUED | OUTPATIENT
Start: 2023-01-04 | End: 2023-01-10

## 2023-01-04 RX ORDER — EZETIMIBE 10 MG/1
10 TABLET ORAL DAILY
Refills: 0 | Status: DISCONTINUED | OUTPATIENT
Start: 2023-01-04 | End: 2023-01-10

## 2023-01-04 RX ORDER — HEPARIN SODIUM 5000 [USP'U]/ML
4400 INJECTION INTRAVENOUS; SUBCUTANEOUS ONCE
Refills: 0 | Status: COMPLETED | OUTPATIENT
Start: 2023-01-04 | End: 2023-01-05

## 2023-01-04 RX ORDER — HEPARIN SODIUM 5000 [USP'U]/ML
4400 INJECTION INTRAVENOUS; SUBCUTANEOUS EVERY 6 HOURS
Refills: 0 | Status: DISCONTINUED | OUTPATIENT
Start: 2023-01-04 | End: 2023-01-04

## 2023-01-04 RX ORDER — ASPIRIN/CALCIUM CARB/MAGNESIUM 324 MG
81 TABLET ORAL DAILY
Refills: 0 | Status: DISCONTINUED | OUTPATIENT
Start: 2023-01-04 | End: 2023-01-10

## 2023-01-04 RX ADMIN — ISOSORBIDE MONONITRATE 30 MILLIGRAM(S): 60 TABLET, EXTENDED RELEASE ORAL at 12:41

## 2023-01-04 RX ADMIN — Medication 0.25 MILLIGRAM(S): at 16:31

## 2023-01-04 RX ADMIN — AMLODIPINE BESYLATE 10 MILLIGRAM(S): 2.5 TABLET ORAL at 18:29

## 2023-01-04 RX ADMIN — Medication 81 MILLIGRAM(S): at 12:40

## 2023-01-04 RX ADMIN — EZETIMIBE 10 MILLIGRAM(S): 10 TABLET ORAL at 12:40

## 2023-01-04 NOTE — PATIENT PROFILE ADULT - FUNCTIONAL SCREEN CURRENT LEVEL: SWALLOWING (IF SCORE 2 OR MORE FOR ANY ITEM, CONSULT REHAB SERVICES), MLM)
Telephone Encounter by Jennifre Chavira at 06/20/17 04:57 PM     Author:  Jennifer Chavira Service:  (none) Author Type:  Patient      Filed:  06/20/17 04:59 PM Encounter Date:  6/20/2017 Status:  Signed     :  Jennifer Chavira (Patient )              RASHAD CHEEMA    Patient Age: 34 year old    ACCT STATUS:   MESSAGE:[CV1.1T]   Patient is calling asking for labs before she sees Dr. Grayson on 06/27/17  Please call patient when ordered.[CV1.1M]  Message confirmed with caller.     Next and Last Visit with Provider and Department  Next visit with BHAKTI GRAYSON is on 06/27/2017 at  1:45 PM in [facility]  Next visit with [facility] is on 06/27/2017 at  1:45 PM in [facility]  Last visit with BHAKTI GRAYSON was on 12/13/2016 at 10:00 AM in [facility]  Last visit with [facility] was on 12/13/2016 at 10:00 AM in [facility]     WEIGHT AND HEIGHT: As of 12/13/2016 weight is 208 lbs.(94.348 kg).   BMI is 35.97 kg/(m^2) calculated from:     Height 5' 5\" (1.651 m) as of 10/10/16     Weight 216 lb 2 oz (98.034 kg) as of 10/10/16[CV1.1T]      Allergies     Allergen  Reactions   • Anesthetic [Benzocaine] Nausea and Vomiting[CV1.2T]     Current outpatient prescriptions       Medication  Sig Dispense Refill   • hydrochlorothiazide (HYDRODIURIL) 25 MG tablet Take 1 Tab by mouth daily. 90 Tab 1   • metoprolol (TOPROL-XL) 25 MG 24 hr tablet TAKE 1 TABLET BY MOUTH DAILY 30 Tab 5   • BERT-BE 0.35 MG TABS Take 1 Tab by mouth daily. 28 Tab 12   • mometasone (NASONEX) 50 MCG/ACT nasal spray Use 2 Sprays in each nostril daily. 17 g 2   • Multiple Vitamin (MULTIVITAMIN OR) Take  by mouth daily.        PHARMACY to use:          Pharmacy preference(s) on file:    [facility] [address]  [facility] [place]    CALL BACK INFO:[CV1.1T] Ok to leave response (including medical information) with family member or on  answering machine[CV1.1M]  ROUTING:[CV1.1T] Patient's physician/staff[CV1.1M]        PCP: Heather Finley DO         INS: Payor: BLUE CHO[employer] PPO / Plan: N/A / Product Type: *No Product type* / Note: This is the primary coverage, but no account was found for this location or the patient's primary location.   ADDRESS:  [address] [place] [address][CV1.1T]         Revision History        User Key Date/Time User Provider Type Action    > CV1.2 06/20/17 04:59 PM Jennifer Chavira Patient  Sign     CV1.1 06/20/17 04:57 PM Jennifer Chavira Patient      M - Manual, T - Template             0 = swallows foods/liquids without difficulty

## 2023-01-04 NOTE — ED PROVIDER NOTE - CONSIDERATION OF ADMISSION OBSERVATION
Consideration of Admission/Observation Patient's symptoms are concerning for cardiac chest pain.  Case discussed with Dr. Lee from cardiology to evaluate patient.

## 2023-01-04 NOTE — CONSULT NOTE ADULT - ATTENDING COMMENTS
Unclear if patient is having true MI vs demand ischemia.  Would certainly exclude PE.  Got aspirin.  Hold off on Plavix and full AC for now.  REpeat ekg as ekg in ED showed new LBBB, but complex narrow on my exam.  New anemia and weight loss makes decision for cath a bit difficult.  To get GI and heme to weigh in.  To follow.

## 2023-01-04 NOTE — H&P ADULT - NSHPREVIEWOFSYSTEMS_GEN_ALL_CORE
CONSTITUTIONAL: No weakness, fevers or chills  EYES/ENT: No visual changes, no throat pain   RESPIRATORY: No cough, wheezing, hemoptysis; No shortness of breath  CARDIOVASCULAR: + chest pain, no palpitations  GASTROINTESTINAL: No abdominal, nausea, vomiting, or hematemesis; No diarrhea or constipation. No melena or hematochezia.  GENITOURINARY: No dysuria, frequency or hematuria  NEUROLOGICAL: No dizziness, numbness, or weakness  SKIN: No itching, burning, rashes, or lesions   All other review of systems is negative unless indicated above.

## 2023-01-04 NOTE — H&P ADULT - NSHPLABSRESULTS_GEN_ALL_CORE
8.6    7.96  )-----------( 425      ( 04 Jan 2023 02:58 )             28.3     01-04    138  |  105  |  23  ----------------------------<  114<H>  3.9   |  25  |  1.00    Ca    9.1      04 Jan 2023 02:58    TPro  8.4<H>  /  Alb  2.0<L>  /  TBili  0.2  /  DBili  x   /  AST  115<H>  /  ALT  137<H>  /  AlkPhos  147<H>  01-04    CT c/a/p - prelim read reviewed with radiologits - concerned about a 26s24vp left renal mass, and 12mm lung nodule, and finidings suggetsive of left ventricular aneureysm

## 2023-01-04 NOTE — H&P ADULT - PROBLEM SELECTOR PLAN 8
SCD given anemia  - also given possible renal cancer, will f/u heme-onc if need to r/o brain mets before starting AC given risk of bleeding

## 2023-01-04 NOTE — H&P ADULT - ASSESSMENT
76 y/o f w/ PMH of CAD, HTN, HLD p/w chest pain, r/o ACS, found to have anemia, and imaging concerning for renal mass

## 2023-01-04 NOTE — H&P ADULT - NSHPPHYSICALEXAM_GEN_ALL_CORE
VITAL SIGNS:    Vital Signs Last 24 Hrs  T(C): 37.4 (04 Jan 2023 01:35), Max: 37.4 (04 Jan 2023 01:35)  T(F): 99.3 (04 Jan 2023 01:35), Max: 99.3 (04 Jan 2023 01:35)  HR: 76 (04 Jan 2023 08:17) (76 - 119)  BP: 147/67 (04 Jan 2023 08:17) (134/84 - 151/80)  BP(mean): --  RR: 17 (04 Jan 2023 08:17) (16 - 18)  SpO2: 100% (04 Jan 2023 08:17) (99% - 100%)    Parameters below as of 04 Jan 2023 08:17  Patient On (Oxygen Delivery Method): room air  O2 Flow (L/min): 2      PHYSICAL EXAM:     GENERAL: no acute distress  HEENT: NC/AT, EOMI, neck supple, MMM  RESPIRATORY: LCTAB/L, no rhonchi, rales, or wheezing  CARDIOVASCULAR: RRR, no murmurs, gallops, rubs  ABDOMINAL: soft, non-tender, non-distended, positive bowel sounds   EXTREMITIES: no clubbing, cyanosis, or edema  NEUROLOGICAL: alert and oriented x 3, non-focal  SKIN: no rashes or lesions   MUSCULOSKELETAL: no gross joint deformity

## 2023-01-04 NOTE — ED PROVIDER NOTE - NSICDXPASTSURGICALHX_GEN_ALL_CORE_FT
PAST SURGICAL HISTORY:  S/P CABG x 3 1995    S/P CABG x 4     S/P coronary artery stent placement     S/P coronary artery stent placement 2 stents - 3 years ago    S/P hernia repair     S/P knee surgery arthroscopic b/l

## 2023-01-04 NOTE — ED PROVIDER NOTE - PROGRESS NOTE DETAILS
Trop mildly elevated  Pt resting comfortable  2nd trop pending  Cardio to see pt in AM. Case d/w Dr. Lee

## 2023-01-04 NOTE — CHART NOTE - NSCHARTNOTEFT_GEN_A_CORE
Ct head reviewed by heme-onc, no evidence of brain mets, and he says that cards can treat ACS as they feel is necessary, care d/w Cards dr blanco, and in coordination with heme team, decision made to start hep gtt and plavix 75 given significantly rising cardiac enzymes concerning for ACS.    Wilmer Henderson, Attending Physician

## 2023-01-04 NOTE — CONSULT NOTE ADULT - ASSESSMENT
anemia  chest pain  abnormal CT     reg diet  CT scan noted  will need onc and urology eval  no overt gi bleeding no objection to asa given elevated troponin, cardiology following

## 2023-01-04 NOTE — H&P ADULT - PROBLEM SELECTOR PLAN 7
pt quit about one month ago  - at this time not requesting nicotine replacement  - tob use would predispose pt to increased risk of renal cancer

## 2023-01-04 NOTE — ED PROVIDER NOTE - CLINICAL SUMMARY MEDICAL DECISION MAKING FREE TEXT BOX
77-year-old male presenting with left chest pain at approximately about 30 experience while he was walking from his car to the house described as a pulling and burning sensation associated with shortness of breath and palpitations and resolved with aspirin and nitroglycerin.  Patient with prior history of CABG and stent in 2008.  Also has a intermittent smoker.  EKG shows left bundle branch block with tachycardia, last EKG was from 2015 unclear if the bundle branch block is relatively old or new.  We will check labs, cardiac troponin, chest x-ray to rule out pneumothorax, pneumonia.  Patient at this time reports pain is mostly resolved.  Will reassess pending results.  Pending cardiology evaluation and possible admission for inpatient work-up.

## 2023-01-04 NOTE — ED ADULT NURSE REASSESSMENT NOTE - NS ED NURSE REASSESS COMMENT FT1
The hospitalist was phoned due to verify the correct heparin order for the paint. RN was transferred to MD Jerome who states she will rely the message to the assigned MD. Will follow up with Md. Patient is currently lying down on the stretcher in NAD, attached to continuos monitoring, in NAD. He denies any pain and states that he is currently comfortable watching TV. Vitals signs updates, will continue to monitor safety maintained.

## 2023-01-04 NOTE — H&P ADULT - PROBLEM SELECTOR PLAN 3
currently resolved - s/p asa 324 and nitroglycerin x1 by EMS  - as per cards elevated trop more likely due to demand ischemia from low Hb, and recommends following next trop, but cards does not feel ACS at this time  - per radiologist, pt had a possible left ventricular aneurysm on CT c/a/p, will obtain 2d echo, and f/u results

## 2023-01-04 NOTE — H&P ADULT - NSICDXFAMILYHX_GEN_ALL_CORE_FT
FAMILY HISTORY:  Father  Still living? Unknown  FH: cardiovascular disease, Age at diagnosis: Age Unknown

## 2023-01-04 NOTE — CONSULT NOTE ADULT - ASSESSMENT
Patient is a 77 year old male with history of CABG in 1995, S/P stent x1 placement in 2008, HTN, hyperlipidemia, presented to the ED with the c/o sudden onset of left-sided chest pain at approximately 11:30 last night. Patient informs that he got home from playing cards with friends around 11:30 last night and was walking to the front door after getting off his car when he started feeling chest pressure and discomfort along with mild SOB is being admitted for new LBBB and rising trops     # Elevated Trops:  - Patient presented with chest pain and was found to have elevated hTrops  - Initial hTrop was 88-->788 - continue to trend till peak- f/u repeat trops at 11:30 AM  - EKG shows new LBBB when compared to last EKG down as out patient on 12/24/22, will repeat STAT EKG  - Patient placed on 2L NC and saturating 100% will d/c NC and monitor O2 sat  - Patient is not complaining of any cardiac symptoms at this time  - unsure if the biomarker trend is consistent with plaque rupture but seems rather demand ischemia in the setting of new anemia. Monitor closely for the development of anginal symptoms or clinical signs of ischemia.  - Recommend to get PE studies to R/o PE  - BP well controlled, monitor routine hemodynamics  - Monitor and replete lytes, keep K>4, Mg>2.  - Patient may need ischemia work up later  - Will continue to follow     # Anemia:  - Patient with recent history of anemia and was being worked up as out patient  - HB on admission 8.6, last Hb on 12/24/22 was 9.9  - Pt informs that UA as out patient showed proteuria and hematuria   - Patient informs that he has scheduled appointment with GI and urology for next week  - Recommend to get UA and consult urology if positive for hematuria   - Recommend GI and Hematology/oncology consults for Anemia workup.     # HTN:  - Chronic stable   - BP well controlled, monitor routine hemodynamics.  - On Amlodipine 10qd, Metoprolol succinate 25 qd and Quinapril 20 mg qd   - Continue home dose     # CAD:  - S/p CABG and stent x 1  - On Asprin 81 and Atorvastatin 80 mg qd   - continue same  Patient is a 77 year old male with history of CABG in 1995, S/P stent x1 placement in 2008, HTN, hyperlipidemia, presented to the ED with the c/o sudden onset of left-sided chest pain at approximately 11:30 last night. Patient informs that he got home from playing cards with friends around 11:30 last night and was walking to the front door after getting off his car when he started feeling chest pressure and discomfort along with mild SOB is being admitted for new LBBB and rising trops     # Elevated Trops:  - Patient presented with chest pain and was found to have elevated hTrops  - Initial hTrop was 88-->788 - continue to trend till peak- f/u repeat trops at 11:30 AM  - EKG shows new LBBB when compared to last EKG down as out patient on 12/24/22, will repeat STAT EKG  - Still feel trops can be demand, but need to see second set.  In the setting of new anemia, will hold off on full ACS treatment for now  - Patient placed on 2L NC and saturating 100% will d/c NC and monitor O2 sat  - Patient is not complaining of any cardiac symptoms at this time  - unsure if the biomarker trend is consistent with plaque rupture but seems rather demand ischemia in the setting of new anemia. Monitor closely for the development of anginal symptoms or clinical signs of ischemia.  - Recommend to get CT PA to R/o PE  - BP well controlled, monitor routine hemodynamics  - Monitor and replete lytes, keep K>4, Mg>2.  - Patient may need ischemia work up later  - Will continue to follow     # Anemia:  - Patient with recent history of anemia and was being worked up as out patient  - HB on admission 8.6, last Hb on 12/24/22 was 9.9  - Pt informs that UA as out patient showed proteuria and hematuria   - Patient informs that he has scheduled appointment with GI and urology for next week  - Recommend to get UA and consult urology if positive for hematuria   - Recommend GI and Hematology/oncology consults for Anemia workup.     # HTN:  - Chronic stable   - BP well controlled, monitor routine hemodynamics.  - On Amlodipine 10qd, Metoprolol succinate 25 qd and Quinapril 20 mg qd   - Continue home dose     # CAD:  - S/p CABG and stent x 1  - On Asprin 81 and Atorvastatin 80 mg qd   - continue same

## 2023-01-04 NOTE — H&P ADULT - HISTORY OF PRESENT ILLNESS
78 y/o f w/ PMH of CAD, HTN, HLD p/w chest pain, pressure like in quality, nonradiating, beginning at 1 am last night, not related to food, called EMS, pain remitted after 45 minutes after getting aspirin 324 and nitroglyceirn per pt.  Patient was concerned it may be heart attack.  He has been having diaphoresis for gilmar last few weeks.  He has not noticed any blood in rectum or any bleeding anywhere.      In the ED, he was found to have a Hb of 8.6, with baseline appearing to be 14.  Cards evaluated pt for elevated troponin, feels it is more likely related to demand ischemia.  Radiologist called about CT c/a/p, found pt has large renal mass, and pulm nodule that may represent metastases.

## 2023-01-04 NOTE — ED ADULT NURSE REASSESSMENT NOTE - NS ED NURSE REASSESS COMMENT FT1
Spoke with Dr. Mendes about heprin drip and initial bolus.  Report given to night RN.  Awaiting orders on heprin bolus vs not giving bolus. Dr Mendes to call back Bella on orders.

## 2023-01-04 NOTE — CONSULT NOTE ADULT - SUBJECTIVE AND OBJECTIVE BOX
[facility]         Semaj Damon, Ar, Luis, Corine, Yaw, Antoine        [phone] (office)    Reason for Consult:  Chest Pain                     CHARTING IN PROGRESS     Interval HPI: Patient seen and examined at bedside. No acute events overnight.     HPI:      PAST MEDICAL & SURGICAL HISTORY:  HTN - Hypertension      H/O hyperlipidemia      CAD, multiple vessel      Renal colic      Hypertension      S/P CABG x 3  1995      S/P coronary artery stent placement  2 stents - 3 years ago      S/P knee surgery  arthroscopic b/l      S/P hernia repair      S/P CABG x 4      S/P coronary artery stent placement          SOCIAL HISTORY: No active tobacco, alcohol or illicit drug use    FAMILY HISTORY:      Home Medications:  amLODIPine 10 mg oral tablet: 1 tab(s) orally once a day (12 Mar 2015 15:39)  atorvastatin 80 mg oral tablet: 1 tab(s) orally once a day (at bedtime) (12 Mar 2015 15:39)  isosorbide mononitrate 30 mg oral tablet, extended release: 1 tab(s) orally once a day (in the morning) (12 Mar 2015 15:39)  quinapril 20 mg oral tablet: 1 tab(s) orally once a day (12 Mar 2015 15:39)      MEDICATIONS  (STANDING):    MEDICATIONS  (PRN):      Allergies    No Known Allergies    Intolerances        REVIEW OF SYSTEMS: Negative except as per HPI.    VITAL SIGNS:   Vital Signs Last 24 Hrs  T(C): 37.4 (04 Jan 2023 01:35), Max: 37.4 (04 Jan 2023 01:35)  T(F): 99.3 (04 Jan 2023 01:35), Max: 99.3 (04 Jan 2023 01:35)  HR: 97 (04 Jan 2023 06:21) (97 - 119)  BP: 151/80 (04 Jan 2023 06:21) (134/84 - 151/80)  BP(mean): --  RR: 18 (04 Jan 2023 06:21) (16 - 18)  SpO2: 100% (04 Jan 2023 06:21) (99% - 100%)    Parameters below as of 04 Jan 2023 06:21  Patient On (Oxygen Delivery Method): room air  O2 Flow (L/min): 2      I&O's Summary      PHYSICAL EXAM:  Constitutional: NAD, well-developed  HEENT NC/AT, moist mucous membranes  Pulmonary: Non-labored, breath sounds are clear bilaterally, no wheezing, rales or rhonchi  Cardiovascular: +S1, S2, RRR, no murmur  Gastrointestinal: Soft, nontender, nondistended, normoactive bowel sounds  Extremities: No peripheral edema   Neurological: Alert, strength and sensitivity are grossly intact  Skin: No obvious lesions/rashes  Psych: Mood & affect appropriate    LABS: All Labs Reviewed:                        8.6    7.96  )-----------( 425      ( 04 Jan 2023 02:58 )             28.3     04 Jan 2023 02:58    138    |  105    |  23     ----------------------------<  114    3.9     |  25     |  1.00     Ca    9.1        04 Jan 2023 02:58    TPro  8.4    /  Alb  2.0    /  TBili  0.2    /  DBili  x      /  AST  115    /  ALT  137    /  AlkPhos  147    04 Jan 2023 02:58          Blood Culture:   01-04 @ 02:58  Pro Bnp [id number]        EKG:    RADIOLOGY:    CXR:   [facility]         Semaj Damon, Ar, Luis, Corine, Yaw, Antoine        [phone] (office)    Reason for Consult:  Chest Pain                      Interval HPI: Patient seen and examined at bedside. No acute events overnight.     HPI:      PAST MEDICAL & SURGICAL HISTORY:  HTN - Hypertension      H/O hyperlipidemia      CAD, multiple vessel      Renal colic      Hypertension      S/P CABG x 3  1995      S/P coronary artery stent placement  2 stents - 3 years ago      S/P knee surgery  arthroscopic b/l      S/P hernia repair      S/P CABG x 4      S/P coronary artery stent placement          SOCIAL HISTORY: No active tobacco, alcohol or illicit drug use    FAMILY HISTORY:      Home Medications:  amLODIPine 10 mg oral tablet: 1 tab(s) orally once a day (12 Mar 2015 15:39)  atorvastatin 80 mg oral tablet: 1 tab(s) orally once a day (at bedtime) (12 Mar 2015 15:39)  isosorbide mononitrate 30 mg oral tablet, extended release: 1 tab(s) orally once a day (in the morning) (12 Mar 2015 15:39)  quinapril 20 mg oral tablet: 1 tab(s) orally once a day (12 Mar 2015 15:39)      MEDICATIONS  (STANDING):    MEDICATIONS  (PRN):      Allergies    No Known Allergies    Intolerances        REVIEW OF SYSTEMS: Negative except as per HPI.    VITAL SIGNS:   Vital Signs Last 24 Hrs  T(C): 37.4 (04 Jan 2023 01:35), Max: 37.4 (04 Jan 2023 01:35)  T(F): 99.3 (04 Jan 2023 01:35), Max: 99.3 (04 Jan 2023 01:35)  HR: 97 (04 Jan 2023 06:21) (97 - 119)  BP: 151/80 (04 Jan 2023 06:21) (134/84 - 151/80)  BP(mean): --  RR: 18 (04 Jan 2023 06:21) (16 - 18)  SpO2: 100% (04 Jan 2023 06:21) (99% - 100%)    Parameters below as of 04 Jan 2023 06:21  Patient On (Oxygen Delivery Method): room air  O2 Flow (L/min): 2      I&O's Summary      PHYSICAL EXAM:  Constitutional: NAD, well-developed  HEENT NC/AT, moist mucous membranes  Pulmonary: Non-labored, breath sounds are clear bilaterally, no wheezing, rales or rhonchi  Cardiovascular: +S1, S2, RRR, no murmur  Gastrointestinal: Soft, nontender, nondistended, normoactive bowel sounds  Extremities: No peripheral edema   Neurological: Alert, strength and sensitivity are grossly intact  Skin: No obvious lesions/rashes  Psych: Mood & affect appropriate    LABS: All Labs Reviewed:                        8.6    7.96  )-----------( 425      ( 04 Jan 2023 02:58 )             28.3     04 Jan 2023 02:58    138    |  105    |  23     ----------------------------<  114    3.9     |  25     |  1.00     Ca    9.1        04 Jan 2023 02:58    TPro  8.4    /  Alb  2.0    /  TBili  0.2    /  DBili  x      /  AST  115    /  ALT  137    /  AlkPhos  147    04 Jan 2023 02:58          Blood Culture:   01-04 @ 02:58  Pro Bnp [id number]        EKG:    RADIOLOGY:    CXR:   [facility]         Semaj Damon, Luis Joyner, Corine, Antoine Tidwell        [phone] (office)    Reason for Consult:  Chest Pain                      HPI:  Patient is a 77 year old male with history of CABG in 1995, S/P stent x1 placement in 2008, HTN, hyperlipidemia, presented to the ED with the c/o sudden onset of left-sided chest pain at approximately 11:30 last night. Patient informs that he got home from playing cards with friends around 11:30 last night and was walking to the front door after getting off his car when he started feeling chest pressure and discomfort along with mild SOB. Patient informs that he sat down in his drive way and called his wife who came out to see him. Claims that his chest pressure seems to be getting worse with burning sensation spreading across his upper chest so wife decided to call paramedics who brought the patient to the ED. Paramedics informs that they gave him 4 aspirins and 1 nitroglycerine on the way to the hospital. Patient informs that his SOB  and chest pain has improved but still feels chest tightness. Denies any palpitations, nausea or vomiting.   Patient follows Dr. Schultz as out patient cardiologist. Last seen him on 12/23/22. Patient was being worked up for Anemia and hematuria. Patient was also found to have left ICA occlusion and was scheduled for left ICA endarterectomy but was postponed by Dr. Schultz after finding that patient Hb has been dropping. Have an appointment with Urologist and GI for next week for further evaluation.     PAST MEDICAL & SURGICAL HISTORY:  HTN - Hypertension      H/O hyperlipidemia      CAD, multiple vessel      Renal colic      Hypertension      S/P CABG x 3  1995      S/P coronary artery stent placement  2 stents - 3 years ago      S/P knee surgery  arthroscopic b/l      S/P hernia repair      S/P CABG x 4      S/P coronary artery stent placement          SOCIAL HISTORY: No active tobacco, alcohol or illicit drug use    FAMILY HISTORY:      Home Medications:  amLODIPine 10 mg oral tablet: 1 tab(s) orally once a day (12 Mar 2015 15:39)  atorvastatin 80 mg oral tablet: 1 tab(s) orally once a day (at bedtime) (12 Mar 2015 15:39)  isosorbide mononitrate 30 mg oral tablet, extended release: 1 tab(s) orally once a day (in the morning) (12 Mar 2015 15:39)  quinapril 20 mg oral tablet: 1 tab(s) orally once a day (12 Mar 2015 15:39)      MEDICATIONS  (STANDING):    MEDICATIONS  (PRN):      Allergies    No Known Allergies    Intolerances        REVIEW OF SYSTEMS:   General: Denies fevers, chills, fatigue, diaphoresis  HEENT: Denies Headache, eye pain, vision changes  Respiratory: Admits to  SOB, denies cough, wheezing  Cardio: Admits to chest pain, denies palpitations, peripheral edema  GI: Denies Nausea, vomiting, diarrhea, constipation  : Denies Frequency, Urgency, diarrhea  Heme/Onc: No easy bruising  MSK: Denies joint pain    VITAL SIGNS:   Vital Signs Last 24 Hrs  T(C): 37.4 (04 Jan 2023 01:35), Max: 37.4 (04 Jan 2023 01:35)  T(F): 99.3 (04 Jan 2023 01:35), Max: 99.3 (04 Jan 2023 01:35)  HR: 97 (04 Jan 2023 06:21) (97 - 119)  BP: 151/80 (04 Jan 2023 06:21) (134/84 - 151/80)  BP(mean): --  RR: 18 (04 Jan 2023 06:21) (16 - 18)  SpO2: 100% (04 Jan 2023 06:21) (99% - 100%)    Parameters below as of 04 Jan 2023 06:21  Patient On (Oxygen Delivery Method): room air  O2 Flow (L/min): 2      I&O's Summary      PHYSICAL EXAM:  Constitutional: Well-developed male who is sitting up in bed and is in NAD  HEENT NC/AT, moist mucous membranes  Pulmonary: Non-labored, breath sounds are clear bilaterally, no wheezing, rales or rhonchi  Cardiovascular: +S1, S2, RRR, no murmur  Gastrointestinal: Soft, nontender, nondistended, normoactive bowel sounds  Extremities: No peripheral edema   Neurological: Alert, strength and sensitivity are grossly intact  Skin: No obvious lesions/rashes  Psych: Mood & affect appropriate    LABS: All Labs Reviewed:                        8.6    7.96  )-----------( 425      ( 04 Jan 2023 02:58 )             28.3     04 Jan 2023 02:58    138    |  105    |  23     ----------------------------<  114    3.9     |  25     |  1.00     Ca    9.1        04 Jan 2023 02:58    TPro  8.4    /  Alb  2.0    /  TBili  0.2    /  DBili  x      /  AST  115    /  ALT  137    /  AlkPhos  147    04 Jan 2023 02:58          Blood Culture:   01-04 @ 02:58  Pro Bnp 527        EKG:    RADIOLOGY:    CXR:

## 2023-01-04 NOTE — ED PROVIDER NOTE - DIFFERENTIAL DIAGNOSIS
Differential Diagnosis Patient presenting with chest pain differential includes ACS, pneumothorax, unlikely to be infectious etiology, PE unlikely as per patient symptoms are more reminiscent of his cardiac chest pain

## 2023-01-04 NOTE — CONSULT NOTE ADULT - SUBJECTIVE AND OBJECTIVE BOX
INCOMPLETE NOTE.  Documentation in Progress  PT SEEN AND EVALUATED.   FULL/ADDITIONAL RECOMMENDATIONS TO FOLLOW   ***************************************************************    Patient is a 77y old  Male who presents with a chief complaint of chest pain, r/o ACS (04 Jan 2023 11:46)      HPI:  78 y/o f w/ PMH of CAD, HTN, HLD p/w chest pain, pressure like in quality, nonradiating, beginning at 1 am last night, not related to food, called EMS, pain remitted after 45 minutes after getting aspirin 324 and nitroglyceirn per pt.  Patient was concerned it may be heart attack.  He has been having diaphoresis for gilmar last few weeks.  He has not noticed any blood in rectum or any bleeding anywhere.      In the ED, he was found to have a Hb of 8.6, with baseline appearing to be 14.  Cards evaluated pt for elevated troponin, feels it is more likely related to demand ischemia.  Radiologist called about CT c/a/p, found pt has large renal mass, and pulm nodule that may represent metastases.   (04 Jan 2023 11:46)      PAST MEDICAL & SURGICAL HISTORY:  HTN - Hypertension      H/O hyperlipidemia      CAD, multiple vessel      Renal colic      Hypertension      S/P CABG x 3  1995      S/P coronary artery stent placement  2 stents - 3 years ago      S/P knee surgery  arthroscopic b/l      S/P hernia repair      S/P CABG x 4      S/P coronary artery stent placement         HEALTH ISSUES - PROBLEM Dx:  Renal mass    Anemia    Chest pain    CAD (coronary artery disease)    HTN (hypertension)    HLD (hyperlipidemia)    Need for prophylactic measure    Smoker          Chest pain [R07.9]    HTN - Hypertension [401.9]    H/O hyperlipidemia [V12.29]    CAD, multiple vessel [414.00]    Renal colic [788.0]    Hypertension [401.9]    S/P CABG x 3 [V45.81]    S/P coronary artery stent placement [V45.82]    S/P knee surgery [V45.89]    S/P hernia repair [V45.89]    S/P CABG x 4 [V45.81]    S/P coronary artery stent placement [V45.82]      FAMILY HISTORY:  FH: cardiovascular disease (Father)        [SOCIAL HISTORY: ]     smoking:       EtOH:       illicit drugs:       occupation:       marital status:       Other:       [ALLERGIES/INTOLERANCES:]  Allergies    No Known Allergies    Intolerances        [MEDICATIONS]  MEDICATIONS  (STANDING):  amLODIPine   Tablet 10 milliGRAM(s) Oral daily  aspirin  chewable 81 milliGRAM(s) Oral daily  ezetimibe 10 milliGRAM(s) Oral daily  isosorbide   mononitrate ER Tablet (IMDUR) 30 milliGRAM(s) Oral daily  lisinopril 20 milliGRAM(s) Oral daily    MEDICATIONS  (PRN):      [REVIEW OF SYSTEMS: ]  CONSTITUTIONAL: normal, no fever, no shakes, no chills   EYES: No eye pain, no visual disturbances, no discharge  ENMT:  no discharge  NECK: No pain, no stiffness  BREASTS: No pain, no masses, no nipple discharge  RESPIRATORY: No cough, no wheezing, no chills, no hemoptysis; No shortness of breath  CARDIOVASCULAR: No chest pain, no palpitations, no dizziness, no leg swelling  GASTROINTESTINAL: No abdominal, no epigastric pain. No nausea, no vomiting, no hematemesis; No diarrhea , no constipation. No melena, no hematochezia.  GENITOURINARY: No dysuria, no frequency, no hematuria, no incontinence  NEUROLOGICAL: No headaches, no memory loss, no loss of strength, no numbness, no tremors  SKIN: No itching, no burning, no rashes, no lesions   LYMPH NODES: No enlarged glands  ENDOCRINE: No heat or cold intolerance; No hair loss  MUSCULOSKELETAL: No joint pain or swelling; No muscle, no back, no extremity pain  PSYCHIATRIC: No depression, no anxiety, no mood swings, no difficulty sleeping  HEME/LYMPH: No easy bruising, no bleeding gums    [VITALS SIGNS 24hrs]  Vital Signs Last 24 Hrs  T(C): 37.4 (04 Jan 2023 12:33), Max: 37.4 (04 Jan 2023 01:35)  T(F): 99.3 (04 Jan 2023 12:33), Max: 99.3 (04 Jan 2023 01:35)  HR: 87 (04 Jan 2023 12:33) (76 - 119)  BP: 155/88 (04 Jan 2023 12:33) (134/84 - 155/88)  BP(mean): --  RR: 16 (04 Jan 2023 12:33) (16 - 18)  SpO2: 97% (04 Jan 2023 12:33) (97% - 100%)    Parameters below as of 04 Jan 2023 12:33  Patient On (Oxygen Delivery Method): room air      Daily Height in cm: 170.18 (04 Jan 2023 01:35)    Daily     I&O's Summary      [PHYSICAL EXAM]  xxxxxxxxx    [LABS: ]                        8.6    7.96  )-----------( 425      ( 04 Jan 2023 02:58 )             28.3     CBC Full  -  ( 04 Jan 2023 02:58 )  WBC Count : 7.96 K/uL  RBC Count : 3.48 M/uL  Hemoglobin : 8.6 g/dL  Hematocrit : 28.3 %  Platelet Count - Automated : 425 K/uL  Mean Cell Volume : 81.3 fl  Mean Cell Hemoglobin : 24.7 pg  Mean Cell Hemoglobin Concentration : 30.4 gm/dL  Auto Neutrophil # : 6.32 K/uL  Auto Lymphocyte # : 0.94 K/uL  Auto Monocyte # : 0.51 K/uL  Auto Eosinophil # : 0.12 K/uL  Auto Basophil # : 0.03 K/uL  Auto Neutrophil % : 79.4 %  Auto Lymphocyte % : 11.8 %  Auto Monocyte % : 6.4 %  Auto Eosinophil % : 1.5 %  Auto Basophil % : 0.4 %    01-04    138  |  105  |  23  ----------------------------<  114<H>  3.9   |  25  |  1.00    Ca    9.1      04 Jan 2023 02:58    TPro  8.4<H>  /  Alb  2.0<L>  /  TBili  0.2  /  DBili  x   /  AST  115<H>  /  ALT  137<H>  /  AlkPhos  147<H>  01-04      LIVER FUNCTIONS - ( 04 Jan 2023 02:58 )  Alb: 2.0 g/dL / Pro: 8.4 g/dL / ALK PHOS: 147 U/L / ALT: 137 U/L / AST: 115 U/L / GGT: x           CARDIAC MARKERS ( 04 Jan 2023 11:38 )  x     / x     / x     / x     / 5.7 ng/mL            CBC TREND (5 Days)  WBC Count: 7.96 K/uL (01-04 @ 02:58)    Hemoglobin: 8.6 g/dL (01-04 @ 02:58)    Hematocrit: 28.3 % (01-04 @ 02:58)    Platelet Count - Automated: 425 K/uL (01-04 @ 02:58)    Anemia Studies                     Myeloma Studies                     [MICROBIOLOGY /  VIROLOGY:]  COVID-19 PCR: NotDetec (04 Jan 2023 03:10)        [PATHOLOGY]     [RADIOLOGY & ADDITIONAL STUDIES:]     CT Abdomen and Pelvis w/ IV Cont:   ACC: [id number] EXAM:  CT ABDOMEN AND PELVIS IC                        ACC: [id number] EXAM:  CT ANGIO CHEST PULM ART [facility]                          PROCEDURE DATE:  01/04/2023          INTERPRETATION:  CLINICAL INFORMATION: Weakness, chest pain    COMPARISON: Report of prior CT from 2002. Images are not available.    CONTRAST/COMPLICATIONS:  IV Contrast: Omnipaque 350 (accession [id number]), IV contrast documented   in unlinked concurrent exam (accession [id number])  74 cc administered   (accession [id number]), 0 cc administered (accession [id number])   26 cc   discarded (accession [id number]), 0 cc discarded (accession [id number])  Oral Contrast: NONE  Complications: None reported at time of study completion    PROCEDURE:  CT Angiography of the Chest wasperformed followed by portal venous phase   imaging of the Abdomen and Pelvis.  Sagittal and coronal reformats were performed as well as 3D (MIP)   reconstructions.    FINDINGS:    CHEST:  LUNGS AND LARGE AIRWAYS: Singulair as are patent. No large focal   consolidation. Minimal scattered groundglass. Left lower lobe 12 mm   nodule image 65 series 2 adjacent to pulmonary artery branch. This is   concerning for metastatic disease. Correlate with PET/CT. Few additional   peripheral subcentimeter nodules measuring up to 4 mm of the left lower   lobe on image 69 series 2, of unclear significance.  PLEURA: No pleural effusion or pneumothorax  VESSELS: No pulmonary embolus. Main pulmonary artery size is within   normal limits.  HEART: Heart size is mildly enlarged, post CABG. LV apical thinning with   1.7 x 0.6 cm aneurysm versus diverticulum, image 76 series 2. Correlate   with echocardiogram. No pericardial effusion.  MEDIASTINUM AND DARA: Small volume lymph nodes. Esophagus is nondistended.  CHEST WALL AND LOWER NECK: No chest wall hematoma. Visualized thyroid is   suboptimally assessed due to streak artifact.    ABDOMEN AND PELVIS:  LIVER: Liver size within normal limits  BILE DUCTS: No distention  GALLBLADDER: Probable fundal adenomyomatosis  SPLEEN: Spleen size borderline enlarged, 13 cm in craniocaudal dimension  PANCREAS: No acute peripancreatic inflammation  ADRENALS: Unremarkable adrenals  KIDNEYS/URETERS: Left renal large heterogeneous hypervascular mass   concerning for renalcell neoplasm, measuring 11.1 x 9.6 x 12.4 cm with   multiple areas of internal necrosis and numerous parasitic surrounding   vessels. The mass is partially endophytic and mostly exophytic. No   hydronephrosis of the kidneys. Additional left renal cyst. Subcentimeter   hypodensities too small to characterize.    BLADDER: Underdistended  REPRODUCTIVE ORGANS: Prostate is enlarged. Question left varicocele.    BOWEL: Limited evaluation due to lack of oral contrast. Stomach is   underdistended. No small bowel distention. Appendix is unremarkable.   Mild-to-moderate apical limits evaluation of the colonic mucosa.   Left-sided large renal mass abuts the left-sided colon and demonstrates   mass effect on the mesentery.  PERITONEUM: No ascites  VESSELS: No abdominal aortic aneurysm. Aortoiliac atheromatous changes.  RETROPERITONEUM/LYMPH NODES: Small volume nodes.  ABDOMINAL WALL: Right posterior gluteal superficial soft tissue nodule   measures 2.4 cm. A similar finding was reported on prior CT from 2002,   though no images are available for comparison. If this finding is stable   over a long period time, sebaceous cyst is considered.  BONES: Also, 1.5 x 1.4 cm lucent lesion of L4 vertebral body concerning   for metastatic disease. Additional nonspecific heterogeneity of the   pelvis. Recommend bone scan versus MRI for further evaluation. Multilevel   degenerative changes of the bones. Median sternotomy.    IMPRESSION:    No pulmonary embolus or large focal consolidation.    Post CABG. LV apical thinning with 1.7 x 0.6 cm aneurysm versus   diverticulum. Small aortic valve calcification. Correlate with   echocardiogram. Dr. Garnett discussed these findings with Dr. Wilmer Henderson on 1/4/2023 at 11:54AM, with read back.    Left renal large heterogeneous hypervascular mass concerning for renal   cell neoplasm, measuring 11.1 x 9.6 x 12.4 cm. Left lower lobe pulmonary   nodule measuring 1.2 cm adjacent to a pulmonary artery branch, concerning   metastatic disease. PET/CT and oncologyevaluation are recommended for   further evaluation. Dr. Garnett discussed these findings with Dr. Wilmer Henderson on 1/4/2023 at 11:54AM, with read back.    Also, 1.5 x 1.4 cm lucent lesion of L4 vertebral body concerning for   metastatic disease. Additional nonspecific heterogeneity of the pelvis.   Recommend bone scan versus MRI for further evaluation.    --- End of Report ---            DERRICK GARNETT M.D., ATTENDING RADIOLOGIST  This document has been electronically signed. Jan 4 2023 12:09PM (01-04-23 @ 10:39)     Patient is a 77y old  Male who presents with a chief complaint of chest pain, r/o ACS (04 Jan 2023 11:46)    HPI:  76 y/o f w/ PMH of CAD, HTN, HLD p/w chest pain, pressure like in quality, nonradiating, beginning at 1 am last night, not related to food, called EMS, pain remitted after 45 minutes after getting aspirin 324 and nitroglyceirn per pt.  Patient was concerned it may be heart attack.  He has been having diaphoresis for gilmar last few weeks.  He has not noticed any blood in rectum or any bleeding anywhere.      In the ED, he was found to have a Hb of 8.6, with baseline appearing to be 14.  Cards evaluated pt for elevated troponin, feels it is more likely related to demand ischemia.  Radiologist called about CT c/a/p, found pt has large renal mass, and pulm nodule that may represent metastases.   (04 Jan 2023 11:46)      PAST MEDICAL & SURGICAL HISTORY:  HTN - Hypertension  H/O hyperlipidemia  CAD, multiple vessel  Renal colic  Hypertension  S/P CABG x 3 1995  S/P coronary artery stent placement 2 stents - 3 years ago  S/P knee surgery arthroscopic b/l  S/P hernia repair  S/P CABG x 4  S/P coronary artery stent placement     HEALTH ISSUES - PROBLEM Dx:  Renal mass  Anemia  Chest pain  CAD (coronary artery disease)  HTN (hypertension)  HLD (hyperlipidemia)  Need for prophylactic measure  Smoker    Chest pain [R07.9]  HTN - Hypertension [401.9]  H/O hyperlipidemia [V12.29]  CAD, multiple vessel [414.00]  Renal colic [788.0]  Hypertension [401.9]  S/P CABG x 3 [V45.81]  S/P coronary artery stent placement [V45.82]  S/P knee surgery [V45.89]  S/P hernia repair [V45.89]  S/P CABG x 4 [V45.81]  S/P coronary artery stent placement [V45.82]      FAMILY HISTORY:  FH: cardiovascular disease (Father)    [SOCIAL HISTORY: ]     smoking:  Ex-smoker 1 pack/day quit one month ago     EtOH:  Social alcohol     illicit drugs:  No illicit drugs     occupation:       marital status:       Other: Has 3 children 1 son, 2 daughter.  Daughter Eri lives in [place].  Son lives in [place].      [ALLERGIES/INTOLERANCES:]  Allergies  No Known Allergies  Intolerances      [MEDICATIONS]  MEDICATIONS  (STANDING):  amLODIPine   Tablet 10 milliGRAM(s) Oral daily  aspirin  chewable 81 milliGRAM(s) Oral daily  ezetimibe 10 milliGRAM(s) Oral daily  isosorbide   mononitrate ER Tablet (IMDUR) 30 milliGRAM(s) Oral daily  lisinopril 20 milliGRAM(s) Oral daily      MEDICATIONS  (PRN):      [REVIEW OF SYSTEMS: ]  CONSTITUTIONAL: +night sweats, +fatigue, no fever, no shakes, no chills   EYES: No eye pain, no visual disturbances, no discharge  ENMT:  no discharge  NECK: No pain, no stiffness  BREASTS: No pain, no masses, no nipple discharge  RESPIRATORY: No cough, no wheezing, no chills, no hemoptysis; No shortness of breath  CARDIOVASCULAR: +chest pain, no palpitations, no dizziness, no leg swelling  GASTROINTESTINAL: No abdominal, no epigastric pain. No nausea, no vomiting, no hematemesis; No diarrhea , no constipation. No melena, no hematochezia.  GENITOURINARY: No dysuria, no frequency, no hematuria, no incontinence  NEUROLOGICAL: No headaches, no memory loss, no loss of strength, no numbness, no tremors  SKIN: No itching, no burning, no rashes, no lesions   LYMPH NODES: No enlarged glands  ENDOCRINE: No heat or cold intolerance; No hair loss  MUSCULOSKELETAL: No joint pain or swelling; No muscle, no back, no extremity pain  PSYCHIATRIC: No depression, no anxiety, no mood swings, no difficulty sleeping  HEME/LYMPH: No easy bruising, no bleeding gums    [VITALS SIGNS 24hrs]  Vital Signs Last 24 Hrs  T(C): 37.4 (04 Jan 2023 12:33), Max: 37.4 (04 Jan 2023 01:35)  T(F): 99.3 (04 Jan 2023 12:33), Max: 99.3 (04 Jan 2023 01:35)  HR: 87 (04 Jan 2023 12:33) (76 - 119)  BP: 155/88 (04 Jan 2023 12:33) (134/84 - 155/88)  BP(mean): --  RR: 16 (04 Jan 2023 12:33) (16 - 18)  SpO2: 97% (04 Jan 2023 12:33) (97% - 100%)    Parameters below as of 04 Jan 2023 12:33  Patient On (Oxygen Delivery Method): room air      Daily Height in cm: 170.18 (04 Jan 2023 01:35)    Daily     I&O's Summary      [PHYSICAL EXAM]  WD NAD  anicteric  S1S2 RRR  CTAB  soft abd NT ND no HSM  no LE edema    [LABS: ]                        8.6    7.96  )-----------( 425      ( 04 Jan 2023 02:58 )             28.3     CBC Full  -  ( 04 Jan 2023 02:58 )  WBC Count : 7.96 K/uL  RBC Count : 3.48 M/uL  Hemoglobin : 8.6 g/dL  Hematocrit : 28.3 %  Platelet Count - Automated : 425 K/uL  Mean Cell Volume : 81.3 fl  Mean Cell Hemoglobin : 24.7 pg  Mean Cell Hemoglobin Concentration : 30.4 gm/dL  Auto Neutrophil # : 6.32 K/uL  Auto Lymphocyte # : 0.94 K/uL  Auto Monocyte # : 0.51 K/uL  Auto Eosinophil # : 0.12 K/uL  Auto Basophil # : 0.03 K/uL  Auto Neutrophil % : 79.4 %  Auto Lymphocyte % : 11.8 %  Auto Monocyte % : 6.4 %  Auto Eosinophil % : 1.5 %  Auto Basophil % : 0.4 %    01-04    138  |  105  |  23  ----------------------------<  114<H>  3.9   |  25  |  1.00    Ca    9.1      04 Jan 2023 02:58    TPro  8.4<H>  /  Alb  2.0<L>  /  TBili  0.2  /  DBili  x   /  AST  115<H>  /  ALT  137<H>  /  AlkPhos  147<H>  01-04      LIVER FUNCTIONS - ( 04 Jan 2023 02:58 )  Alb: 2.0 g/dL / Pro: 8.4 g/dL / ALK PHOS: 147 U/L / ALT: 137 U/L / AST: 115 U/L / GGT: x           CARDIAC MARKERS ( 04 Jan 2023 11:38 )  x     / x     / x     / x     / 5.7 ng/mL            CBC TREND (5 Days)  WBC Count: 7.96 K/uL (01-04 @ 02:58)    Hemoglobin: 8.6 g/dL (01-04 @ 02:58)    Hematocrit: 28.3 % (01-04 @ 02:58)    Platelet Count - Automated: 425 K/uL (01-04 @ 02:58)         [MICROBIOLOGY /  VIROLOGY:]  COVID-19 PCR: NotDetec (04 Jan 2023 03:10)       [RADIOLOGY & ADDITIONAL STUDIES:]     CT Abdomen and Pelvis w/ IV Cont:   ACC: [id number] EXAM:  CT ABDOMEN AND PELVIS IC                        ACC: [id number] EXAM:  CT ANGIO CHEST PULM ART [facility]                        PROCEDURE DATE:  01/04/2023    INTERPRETATION:  CLINICAL INFORMATION: Weakness, chest pain  COMPARISON: Report of prior CT from 2002. Images are not available.      FINDINGS:    CHEST:  LUNGS AND LARGE AIRWAYS: Singulair as are patent. No large focal   consolidation. Minimal scattered groundglass. Left lower lobe 12 mm   nodule image 65 series 2 adjacent to pulmonary artery branch. This is   concerning for metastatic disease. Correlate with PET/CT. Few additional   peripheral subcentimeter nodules measuring up to 4 mm of the left lower   lobe on image 69 series 2, of unclear significance.  PLEURA: No pleural effusion or pneumothorax  VESSELS: No pulmonary embolus. Main pulmonary artery size is within   normal limits.  HEART: Heart size is mildly enlarged, post CABG. LV apical thinning with   1.7 x 0.6 cm aneurysm versus diverticulum, image 76 series 2. Correlate   with echocardiogram. No pericardial effusion.  MEDIASTINUM AND DARA: Small volume lymph nodes. Esophagus is nondistended.  CHEST WALL AND LOWER NECK: No chest wall hematoma. Visualized thyroid is   suboptimally assessed due to streak artifact.    ABDOMEN AND PELVIS:  LIVER: Liver size within normal limits  BILE DUCTS: No distention  GALLBLADDER: Probable fundal adenomyomatosis  SPLEEN: Spleen size borderline enlarged, 13 cm in craniocaudal dimension  PANCREAS: No acute peripancreatic inflammation  ADRENALS: Unremarkable adrenals  KIDNEYS/URETERS: Left renal large heterogeneous hypervascular mass   concerning for renal cell neoplasm, measuring 11.1 x 9.6 x 12.4 cm with   multiple areas of internal necrosis and numerous parasitic surrounding   vessels. The mass is partially endophytic and mostly exophytic. No   hydronephrosis of the kidneys. Additional left renal cyst. Subcentimeter   hypodensities too small to characterize.    BLADDER: Underdistended  REPRODUCTIVE ORGANS: Prostate is enlarged. Question left varicocele.    BOWEL: Limited evaluation due to lack of oral contrast. Stomach is   underdistended. No small bowel distention. Appendix is unremarkable.   Mild-to-moderate apical limits evaluation of the colonic mucosa.   Left-sided large renal mass abuts the left-sided colon and demonstrates   mass effect on the mesentery.  PERITONEUM: No ascites  VESSELS: No abdominal aortic aneurysm. Aortoiliac atheromatous changes.  RETROPERITONEUM/LYMPH NODES: Small volume nodes.  ABDOMINAL WALL: Right posterior gluteal superficial soft tissue nodule   measures 2.4 cm. A similar finding was reported on prior CT from 2002,   though no images are available for comparison. If this finding is stable   over a long period time, sebaceous cyst is considered.  BONES: Also, 1.5 x 1.4 cm lucent lesion of L4 vertebral body concerning   for metastatic disease. Additional nonspecific heterogeneity of the   pelvis. Recommend bone scan versus MRI for further evaluation. Multilevel   degenerative changes of the bones. Median sternotomy.    IMPRESSION:    No pulmonary embolus or large focal consolidation.    Post CABG. LV apical thinning with 1.7 x 0.6 cm aneurysm versus   diverticulum. Small aortic valve calcification. Correlate with   echocardiogram. Dr. Garnett discussed these findings with Dr. Wilmer Henderson on 1/4/2023 at 11:54AM, with read back.    Left renal large heterogeneous hypervascular mass concerning for renal   cell neoplasm, measuring 11.1 x 9.6 x 12.4 cm. Left lower lobe pulmonary   nodule measuring 1.2 cm adjacent to a pulmonary artery branch, concerning   metastatic disease. PET/CT and oncologyevaluation are recommended for   further evaluation. Dr. Garnett discussed these findings with Dr. Wilmer Henderson on 1/4/2023 at 11:54AM, with read back.    Also, 1.5 x 1.4 cm lucent lesion of L4 vertebral body concerning for   metastatic disease. Additional nonspecific heterogeneity of the pelvis.   Recommend bone scan versus MRI for further evaluation.    --- End of Report ---  DERRICK GARNETT M.D., ATTENDING RADIOLOGIST  This document has been electronically signed. Jan 4 2023 12:09PM (01-04-23 @ 10:39)

## 2023-01-04 NOTE — CONSULT NOTE ADULT - SUBJECTIVE AND OBJECTIVE BOX
[facility]  Vivek Cole PA-C  [address] [place] [address] [id number] [phone]      Chief Complaint:  Patient is a 77y old  Male who presents with a chief complaint of chest pain, r/o ACS (04 Jan 2023 13:17)      HPI:78 y/o f w/ PMH of CAD, HTN, HLD p/w chest pain, pressure like in quality, nonradiating, beginning at 1 am last night, not related to food, called EMS, pain remitted after 45 minutes after getting aspirin 324 and nitroglyceirn per pt.  Patient was concerned it may be heart attack.  He has been having diaphoresis for gilmar last few weeks.  He has not noticed any blood in rectum or any bleeding anywhere.      Allergies:  No Known Allergies      Medications:  amLODIPine   Tablet 10 milliGRAM(s) Oral daily  aspirin  chewable 81 milliGRAM(s) Oral daily  ezetimibe 10 milliGRAM(s) Oral daily  isosorbide   mononitrate ER Tablet (IMDUR) 30 milliGRAM(s) Oral daily  lisinopril 20 milliGRAM(s) Oral daily  LORazepam   Injectable 0.25 milliGRAM(s) IV Push once  LORazepam   Injectable 0.25 milliGRAM(s) IV Push once PRN      PMHX/PSHX:  HTN - Hypertension    H/O hyperlipidemia    CAD, multiple vessel    Renal colic    Hypertension    S/P CABG x 3    S/P coronary artery stent placement    S/P knee surgery    S/P hernia repair    S/P CABG x 4    S/P coronary artery stent placement        Family history:  FH: cardiovascular disease (Father)        Social History:     ROS:     General:  No wt loss, fevers, chills, night sweats, fatigue,   Eyes:  Good vision, no reported pain  ENT:  No sore throat, pain, runny nose, dysphagia  CV:  No pain, palpitations, hypo/hypertension  Resp:  No dyspnea, cough, tachypnea, wheezing  GI:  No pain, No nausea, No vomiting, No diarrhea, No constipation, No weight loss, No fever, No pruritis, No rectal bleeding, No tarry stools, No dysphagia,  :  No pain, bleeding, incontinence, nocturia  Muscle:  No pain, weakness  Neuro:  No weakness, tingling, memory problems  Psych:  No fatigue, insomnia, mood problems, depression  Endocrine:  No polyuria, polydipsia, cold/heat intolerance  Heme:  No petechiae, ecchymosis, easy bruisability  Skin:  No rash, tattoos, scars, edema      PHYSICAL EXAM:   Vital Signs:  Vital Signs Last 24 Hrs  T(C): 37.4 (04 Jan 2023 12:33), Max: 37.4 (04 Jan 2023 01:35)  T(F): 99.3 (04 Jan 2023 12:33), Max: 99.3 (04 Jan 2023 01:35)  HR: 87 (04 Jan 2023 12:33) (76 - 119)  BP: 155/88 (04 Jan 2023 12:33) (134/84 - 155/88)  BP(mean): --  RR: 16 (04 Jan 2023 12:33) (16 - 18)  SpO2: 97% (04 Jan 2023 12:33) (97% - 100%)    Parameters below as of 04 Jan 2023 12:33  Patient On (Oxygen Delivery Method): room air      Daily Height in cm: 170.18 (04 Jan 2023 01:35)    Daily     GENERAL:  Appears stated age,   HEENT:  NC/AT,    CHEST:  Full & symmetric excursion,   HEART:  Regular rhythm  ABDOMEN:  Soft, non-tender, non-distended,   EXTEREMITIES:  no cyanosis,clubbing or edema  SKIN:  No rash  NEURO:  Alert,    LABS:                        8.6    7.96  )-----------( 425      ( 04 Jan 2023 02:58 )             28.3     01-04    138  |  105  |  23  ----------------------------<  114<H>  3.9   |  25  |  1.00    Ca    9.1      04 Jan 2023 02:58    TPro  8.4<H>  /  Alb  2.0<L>  /  TBili  0.2  /  DBili  x   /  AST  115<H>  /  ALT  137<H>  /  AlkPhos  147<H>  01-04    LIVER FUNCTIONS - ( 04 Jan 2023 02:58 )  Alb: 2.0 g/dL / Pro: 8.4 g/dL / ALK PHOS: 147 U/L / ALT: 137 U/L / AST: 115 U/L / GGT: x               Amylase Serum--      Lipase [id number]       Ammonia--      Imaging:

## 2023-01-04 NOTE — H&P ADULT - NSHPSOCIALHISTORY_GEN_ALL_CORE
lives with significant other  + tob quit 1 month ago, .33-.5 pk/day for last 30 yrs, 1 ppd for 20 yrs

## 2023-01-04 NOTE — CONSULT NOTE ADULT - ASSESSMENT
[ASSESSMENT and  PLAN]  Metastatic renal cell cancer [suspected]  Lung metastasis  Bone metastasis  Acute coronary syndrome  LBBB  Anemia due to chronic disease  Anemia possibly due to bleeding  Anemia due to iron deficiency  MGUS    77-year-old male admitted with chest pain and new left bundle branch block.  Seen recently outpatient for preop evaluation for left ICA Endarterectomy  Also noted to have new/worsening anemia.  Additional history with recent evaluation and referrals for anemia and hematuria.  Saw new PMD recently with work-up outpatient showing monoclonal protein.    CTA of the chest and CT AP showing no evidence of pulmonary embolism.  Small volume mediastinum and hilar lymph nodes.  Borderline spleen 13 cm.  Left renal heterogeneous hypervascular mass concerning for renal cell carcinoma measuring 11.1 x 9.6 x 12.4 cm with multiple areas of internal necrosis and numerous parasitic surrounding vessels.  Mass is partially endophytic and mostly exophytic.  No hydronephrosis. Mass abuts the left colon and demonstrates mass-effect on the mesentery.  Small volume retroperitoneal lymph nodes.  On 0.5 x 1.4 cm lucent lesion L4 vertebral body concerning for metastasis.  Incidental noted enlarged prostate.  Questionable left varicocele.    Left lower lobe pulm millimeter nodule adjacent to pulmonary artery branch.  Concerning for metastatic disease.  Few additional peripheral subcentimeter nodules measuring up to 4 mm of the left lower lobe, of unclear significance.    RECOMMENDATIONS    Management of acute coronary syndrome per cardiology.  Treatment cardiac conditions supersede need for work-up for suspected metastatic renal cancer.    Will need eventual biopsy of metastatic sites to document disease.  Likely bone lesion may be more accessible on nodules.  Check bone scan    Check anemia studies.  Check type and screen.  Transfuse 1 unit PRBC to decrease demand.  Keep Hgb >8 g/dL.    Transfuse PRBC as clinically indicated.   Transfuse PRBC if Hgb <7.0 or if symptomatic.   Follow CBC    Check Anemia studies.     DVT Prophylaxis    Thank you for consulting us.     Discussed plan of care with patient and or family in detail.   They expressed understanding of the treatment plan.   Risks, benefits and alternatives discussed in detail.   Opportunity given for questions and discussion.   Any questions or concerns all addressed and answered to their satisfaction, and in lay terms.     Discussed with Santiago Medina.    > 88  minutes spent in direct patient care, examining and counseling patient,  reviewing  the notes, lab data/ imaging , discussion with multidisciplinary team.

## 2023-01-04 NOTE — ED PROVIDER NOTE - OBJECTIVE STATEMENT
77-year-old male with history of CABG in 1995, 1 stent placement in 2008, hypertension, hyperlipidemia, someday smoker presenting for left-sided chest pain that occurred at approximately 11:30 AM, felt like a pulling and burning occurred while he was walking from his car to the home.  Called EMS.  EMS gave him 4 aspirin and and 1 nitroglycerin which helped improve the pain.  He reports he had a mini second episode in the ED but the oxygen helped.  Also Complained of shortness of breath and palpitations with the pain.  Cardiologist is Dr. Schultz.  Primary care doctor is Dr. Bates. Patient reports his pain is better at this time does not want any more medications.

## 2023-01-04 NOTE — ED ADULT TRIAGE NOTE - CHIEF COMPLAINT QUOTE
Patient brought in by ambulance from home as reported complaining of mid-chest pain with history of CABG x 3 was given aspirin 324 and 1 nitro SL with 18 ga Left arm

## 2023-01-04 NOTE — H&P ADULT - PROBLEM SELECTOR PLAN 1
Radiologist called about a left renal mass, and lung nodule concerning for metastases - findings has been discussed with Dr. Powers in Heme-Onc, will f/u recs  - renal malignancy may explain anemia  - will f/u iron studies

## 2023-01-04 NOTE — H&P ADULT - PROBLEM SELECTOR PLAN 2
Hb dropped to 8.6 form baseline of 14  - maybe related to renal mass, possible anemia of chronic disease related to an underlying renal malignancy  - will f/u Heme-onc recs  - iron studies ordered  - GI consult also called, and FOBT ordered

## 2023-01-04 NOTE — ED ADULT TRIAGE NOTE - NS ED NURSE AMBULANCES
Detail Level: Simple Consent: The patient's consent was obtained including but not limited to risks of crusting, scabbing, blistering, scarring, darker or lighter pigmentary change, recurrence, incomplete removal and infection. Post-Care Instructions: I reviewed with the patient in detail post-care instructions. Patient is to wear sunprotection, and avoid picking at any of the treated lesions. Patient may apply Polysporin or Vaseline to crusted or scabbing areas. [facility] Post-Care Instructions In Note?: no Number Of Freeze-Thaw Cycles: 1 freeze-thaw cycle Duration Of Freeze Thaw-Cycle (Seconds): 0

## 2023-01-04 NOTE — ED PROVIDER NOTE - NSICDXPASTMEDICALHX_GEN_ALL_CORE_FT
PAST MEDICAL HISTORY:  CAD, multiple vessel     H/O hyperlipidemia     HTN - Hypertension     Hypertension     Renal colic

## 2023-01-05 DIAGNOSIS — R93.89 ABNORMAL FINDINGS ON DIAGNOSTIC IMAGING OF OTHER SPECIFIED BODY STRUCTURES: ICD-10-CM

## 2023-01-05 LAB
ALBUMIN SERPL ELPH-MCNC: 1.9 G/DL — LOW (ref 3.3–5)
ALP SERPL-CCNC: 132 U/L — HIGH (ref 40–120)
ALT FLD-CCNC: 111 U/L — HIGH (ref 12–78)
ANION GAP SERPL CALC-SCNC: 9 MMOL/L — SIGNIFICANT CHANGE UP (ref 5–17)
APTT BLD: 52.8 SEC — HIGH (ref 27.5–35.5)
APTT BLD: 63.4 SEC — HIGH (ref 27.5–35.5)
AST SERPL-CCNC: 79 U/L — HIGH (ref 15–37)
BILIRUB SERPL-MCNC: 0.3 MG/DL — SIGNIFICANT CHANGE UP (ref 0.2–1.2)
BUN SERPL-MCNC: 15 MG/DL — SIGNIFICANT CHANGE UP (ref 7–23)
CALCIUM SERPL-MCNC: 9.3 MG/DL — SIGNIFICANT CHANGE UP (ref 8.5–10.1)
CHLORIDE SERPL-SCNC: 103 MMOL/L — SIGNIFICANT CHANGE UP (ref 96–108)
CK MB BLD-MCNC: 3.7 % — HIGH (ref 0–3.5)
CK MB CFR SERPL CALC: 1.8 NG/ML — SIGNIFICANT CHANGE UP (ref 0–3.6)
CK SERPL-CCNC: 49 U/L — SIGNIFICANT CHANGE UP (ref 26–308)
CO2 SERPL-SCNC: 26 MMOL/L — SIGNIFICANT CHANGE UP (ref 22–31)
CREAT SERPL-MCNC: 0.88 MG/DL — SIGNIFICANT CHANGE UP (ref 0.5–1.3)
CRP SERPL-MCNC: 103 MG/L — HIGH
EGFR: 89 ML/MIN/1.73M2 — SIGNIFICANT CHANGE UP
ERYTHROCYTE [SEDIMENTATION RATE] IN BLOOD: 119 MM/HR — HIGH (ref 0–20)
FERRITIN SERPL-MCNC: 860 NG/ML — HIGH (ref 30–400)
FERRITIN SERPL-MCNC: 889 NG/ML — HIGH (ref 30–400)
FOLATE SERPL-MCNC: >20 NG/ML — SIGNIFICANT CHANGE UP
FOLATE SERPL-MCNC: >20 NG/ML — SIGNIFICANT CHANGE UP
GLUCOSE SERPL-MCNC: 146 MG/DL — HIGH (ref 70–99)
HAPTOGLOB SERPL-MCNC: 692 MG/DL — HIGH (ref 34–200)
HCT VFR BLD CALC: 28.4 % — LOW (ref 39–50)
HCT VFR BLD CALC: 30 % — LOW (ref 39–50)
HCV AB S/CO SERPL IA: 0.11 S/CO — SIGNIFICANT CHANGE UP (ref 0–0.99)
HCV AB SERPL-IMP: SIGNIFICANT CHANGE UP
HGB BLD-MCNC: 8.7 G/DL — LOW (ref 13–17)
HGB BLD-MCNC: 9.3 G/DL — LOW (ref 13–17)
IRON SATN MFR SERPL: 10 % — LOW (ref 16–55)
IRON SATN MFR SERPL: 22 UG/DL — LOW (ref 45–165)
IRON SATN MFR SERPL: 24 UG/DL — LOW (ref 45–165)
IRON SATN MFR SERPL: 8 % — LOW (ref 16–55)
LDH SERPL L TO P-CCNC: 166 U/L — SIGNIFICANT CHANGE UP (ref 50–242)
MCHC RBC-ENTMCNC: 24.8 PG — LOW (ref 27–34)
MCHC RBC-ENTMCNC: 24.9 PG — LOW (ref 27–34)
MCHC RBC-ENTMCNC: 30.6 GM/DL — LOW (ref 32–36)
MCHC RBC-ENTMCNC: 31 GM/DL — LOW (ref 32–36)
MCV RBC AUTO: 80.2 FL — SIGNIFICANT CHANGE UP (ref 80–100)
MCV RBC AUTO: 80.9 FL — SIGNIFICANT CHANGE UP (ref 80–100)
NRBC # BLD: 0 /100 WBCS — SIGNIFICANT CHANGE UP (ref 0–0)
NRBC # BLD: 0 /100 WBCS — SIGNIFICANT CHANGE UP (ref 0–0)
PLATELET # BLD AUTO: 437 K/UL — HIGH (ref 150–400)
PLATELET # BLD AUTO: 509 K/UL — HIGH (ref 150–400)
POTASSIUM SERPL-MCNC: 3.6 MMOL/L — SIGNIFICANT CHANGE UP (ref 3.5–5.3)
POTASSIUM SERPL-SCNC: 3.6 MMOL/L — SIGNIFICANT CHANGE UP (ref 3.5–5.3)
PROT SERPL-MCNC: 8.2 G/DL — SIGNIFICANT CHANGE UP (ref 6–8.3)
RBC # BLD: 3.51 M/UL — LOW (ref 4.2–5.8)
RBC # BLD: 3.74 M/UL — LOW (ref 4.2–5.8)
RBC # FLD: 17.2 % — HIGH (ref 10.3–14.5)
RBC # FLD: 17.4 % — HIGH (ref 10.3–14.5)
SODIUM SERPL-SCNC: 138 MMOL/L — SIGNIFICANT CHANGE UP (ref 135–145)
TIBC SERPL-MCNC: 251 UG/DL — SIGNIFICANT CHANGE UP (ref 220–430)
TIBC SERPL-MCNC: 264 UG/DL — SIGNIFICANT CHANGE UP (ref 220–430)
TROPONIN I, HIGH SENSITIVITY RESULT: 606.9 NG/L — HIGH
TROPONIN I, HIGH SENSITIVITY RESULT: 703 NG/L — HIGH
UIBC SERPL-MCNC: 227 UG/DL — SIGNIFICANT CHANGE UP (ref 110–370)
UIBC SERPL-MCNC: 242 UG/DL — SIGNIFICANT CHANGE UP (ref 110–370)
VIT B12 SERPL-MCNC: 905 PG/ML — SIGNIFICANT CHANGE UP (ref 232–1245)
VIT B12 SERPL-MCNC: 912 PG/ML — SIGNIFICANT CHANGE UP (ref 232–1245)
WBC # BLD: 8.18 K/UL — SIGNIFICANT CHANGE UP (ref 3.8–10.5)
WBC # BLD: 8.18 K/UL — SIGNIFICANT CHANGE UP (ref 3.8–10.5)
WBC # FLD AUTO: 8.18 K/UL — SIGNIFICANT CHANGE UP (ref 3.8–10.5)
WBC # FLD AUTO: 8.18 K/UL — SIGNIFICANT CHANGE UP (ref 3.8–10.5)

## 2023-01-05 PROCEDURE — 99222 1ST HOSP IP/OBS MODERATE 55: CPT

## 2023-01-05 PROCEDURE — 99233 SBSQ HOSP IP/OBS HIGH 50: CPT | Mod: GC

## 2023-01-05 PROCEDURE — 93306 TTE W/DOPPLER COMPLETE: CPT | Mod: 26

## 2023-01-05 PROCEDURE — 99233 SBSQ HOSP IP/OBS HIGH 50: CPT

## 2023-01-05 RX ADMIN — LISINOPRIL 20 MILLIGRAM(S): 2.5 TABLET ORAL at 05:35

## 2023-01-05 RX ADMIN — ISOSORBIDE MONONITRATE 30 MILLIGRAM(S): 60 TABLET, EXTENDED RELEASE ORAL at 11:30

## 2023-01-05 RX ADMIN — HEPARIN SODIUM 4400 UNIT(S): 5000 INJECTION INTRAVENOUS; SUBCUTANEOUS at 01:31

## 2023-01-05 RX ADMIN — AMLODIPINE BESYLATE 10 MILLIGRAM(S): 2.5 TABLET ORAL at 04:38

## 2023-01-05 RX ADMIN — Medication 81 MILLIGRAM(S): at 11:30

## 2023-01-05 RX ADMIN — EZETIMIBE 10 MILLIGRAM(S): 10 TABLET ORAL at 12:10

## 2023-01-05 RX ADMIN — CLOPIDOGREL BISULFATE 75 MILLIGRAM(S): 75 TABLET, FILM COATED ORAL at 11:31

## 2023-01-05 RX ADMIN — HEPARIN SODIUM 900 UNIT(S)/HR: 5000 INJECTION INTRAVENOUS; SUBCUTANEOUS at 09:57

## 2023-01-05 RX ADMIN — HEPARIN SODIUM 900 UNIT(S)/HR: 5000 INJECTION INTRAVENOUS; SUBCUTANEOUS at 03:26

## 2023-01-05 RX ADMIN — HEPARIN SODIUM 900 UNIT(S)/HR: 5000 INJECTION INTRAVENOUS; SUBCUTANEOUS at 23:22

## 2023-01-05 RX ADMIN — HEPARIN SODIUM 900 UNIT(S)/HR: 5000 INJECTION INTRAVENOUS; SUBCUTANEOUS at 19:37

## 2023-01-05 RX ADMIN — HEPARIN SODIUM 900 UNIT(S)/HR: 5000 INJECTION INTRAVENOUS; SUBCUTANEOUS at 13:29

## 2023-01-05 NOTE — PROGRESS NOTE ADULT - PROBLEM SELECTOR PLAN 1
Chest pain - etiology likely ACS with new LBBB on EKG though mixed picture with slow progressive anemia causing demand  Trops ~ 80 trended to peak 2005.2   S/p asa 324 and nitroglycerin x1 by EMS  ACS treatment initiated    - heparin gtt, asa, plavix, imdur    - Additional home meds include bb and atorva - to be initiated - f/u cards rec  CT C/A/P - Left ventricular aneurysm will obtain 2d echo - f/u  Cardiology following Chest pain - etiology likely ACS with new LBBB on EKG though mixed picture with slow progressive anemia causing demand  Trops ~ 80 trended to peak 2005.2   S/p asa 324 and nitroglycerin x1 by EMS  ACS treatment initiated    - heparin gtt, asa, plavix, imdur    - Additional home meds include bb and atorva - to be initiated - f/u cards rec  CT C/A/P - Left ventricular aneurysm will obtain 2d echo - f/u official read  Cardiology following

## 2023-01-05 NOTE — PROGRESS NOTE ADULT - SUBJECTIVE AND OBJECTIVE BOX
CHARTING IN PROGRESS    Patient is a 77y old  Male who presents with a chief complaint of chest pain, r/o ACS (2023 11:55)      Subjective:  INTERVAL HPI/OVERNIGHT EVENTS: Patient seen and examined at bedside. No overnight events occurred. Patient has no complaints at this time. Denies fevers, chills, headache, lightheadedness, chest pain, dyspnea, abdominal pain, n/v/d/c.    MEDICATIONS  (STANDING):  amLODIPine   Tablet 10 milliGRAM(s) Oral daily  aspirin  chewable 81 milliGRAM(s) Oral daily  clopidogrel Tablet 75 milliGRAM(s) Oral daily  ezetimibe 10 milliGRAM(s) Oral daily  heparin  Infusion.  Unit(s)/Hr (9 mL/Hr) IV Continuous <Continuous>  influenza  Vaccine (HIGH DOSE) 0.7 milliLiter(s) IntraMuscular once  isosorbide   mononitrate ER Tablet (IMDUR) 30 milliGRAM(s) Oral daily  lisinopril 20 milliGRAM(s) Oral daily    MEDICATIONS  (PRN):  heparin   Injectable 4400 Unit(s) IV Push every 6 hours PRN For aPTT less than 40  LORazepam   Injectable 0.25 milliGRAM(s) IV Push once PRN Anxiety      Allergies    No Known Allergies    Intolerances        REVIEW OF SYSTEMS:  CONSTITUTIONAL: No fever or chills  HEENT:  No headache, no sore throat  RESPIRATORY: No cough, wheezing, or shortness of breath  CARDIOVASCULAR: No chest pain, palpitations  GASTROINTESTINAL: No abd pain, nausea, vomiting, or diarrhea  GENITOURINARY: No dysuria, frequency, or hematuria  NEUROLOGICAL: no focal weakness or dizziness  MUSCULOSKELETAL: no myalgias     Objective:  Vital Signs Last 24 Hrs  T(C): 36.8 (2023 08:25), Max: 37.4 (2023 12:33)  T(F): 98.2 (2023 08:25), Max: 99.3 (2023 12:33)  HR: 98 (2023 08:25) (85 - 98)  BP: 133/80 (2023 08:25) (125/72 - 155/88)  BP(mean): --  RR: 20 (2023 08:25) (16 - 20)  SpO2: 99% (2023 08:25) (97% - 99%)    Parameters below as of 2023 08:25  Patient On (Oxygen Delivery Method): room air        GENERAL: NAD, lying in bed comfortably  HEAD:  Atraumatic, Normocephalic  EYES: EOMI, PERRLA, conjunctiva and sclera clear  ENT: Moist mucous membranes  NECK: Supple, No JVD  CHEST/LUNG: Clear to auscultation bilaterally; No rales, rhonchi, wheezing, or rubs. Unlabored respirations  HEART: Regular rate and rhythm; No murmurs, rubs, or gallops  ABDOMEN: Bowel sounds present; Soft, Nontender, Nondistended. No hepatomegaly  EXTREMITIES:  2+ Peripheral Pulses, brisk capillary refill. No clubbing, cyanosis, or edema  NERVOUS SYSTEM:  Alert & Oriented X3, speech clear. No deficits   MSK: FROM all 4 extremities, full and equal strength  SKIN: No rashes or lesions    LABS:                        9.3    8.18  )-----------( 509      ( 2023 09:35 )             30.0     CBC Full  -  ( 2023 09:35 )  WBC Count : 8.18 K/uL  Hemoglobin : 9.3 g/dL  Hematocrit : 30.0 %  Platelet Count - Automated : 509 K/uL  Mean Cell Volume : 80.2 fl  Mean Cell Hemoglobin : 24.9 pg  Mean Cell Hemoglobin Concentration : 31.0 gm/dL  Auto Neutrophil # : x  Auto Lymphocyte # : x  Auto Monocyte # : x  Auto Eosinophil # : x  Auto Basophil # : x  Auto Neutrophil % : x  Auto Lymphocyte % : x  Auto Monocyte % : x  Auto Eosinophil % : x  Auto Basophil % : x    2023 06:45    138    |  103    |  15     ----------------------------<  146    3.6     |  26     |  0.88     Ca    9.3        2023 06:45    TPro  8.2    /  Alb  1.9    /  TBili  0.3    /  DBili  x      /  AST  79     /  ALT  111    /  AlkPhos  132    2023 06:45    PT/INR - ( 2023 19:16 )   PT: 14.7 sec;   INR: 1.25 ratio         PTT - ( 2023 09:35 )  PTT:52.8 sec  Urinalysis Basic - ( 2023 18:18 )    Color: Yellow / Appearance: Clear / S.010 / pH: x  Gluc: x / Ketone: Negative  / Bili: Negative / Urobili: Negative   Blood: x / Protein: 100 / Nitrite: Negative   Leuk Esterase: Negative / RBC: 0-2 /HPF / WBC 3-5   Sq Epi: x / Non Sq Epi: Occasional / Bacteria: x      CAPILLARY BLOOD GLUCOSE              RADIOLOGY & ADDITIONAL TESTS:    Personally reviewed.     Consultant(s) Notes Reviewed:  [x] YES  [ ] NO     Patient is a 77y old  Male who presents with a chief complaint of chest pain, r/o ACS (2023 11:55)      Subjective:  INTERVAL HPI/OVERNIGHT EVENTS: Patient seen and examined at bedside. Overnight patient was started on treatment for ACS. Currently only complaint is night sweats which have been present for the last 2-3 weeks. Patient has no complaints at this time. Denies fevers, chills, headache, lightheadedness, chest pain, dyspnea, abdominal pain, n/v/d/c.    MEDICATIONS  (STANDING):  amLODIPine   Tablet 10 milliGRAM(s) Oral daily  aspirin  chewable 81 milliGRAM(s) Oral daily  clopidogrel Tablet 75 milliGRAM(s) Oral daily  ezetimibe 10 milliGRAM(s) Oral daily  heparin  Infusion.  Unit(s)/Hr (9 mL/Hr) IV Continuous <Continuous>  influenza  Vaccine (HIGH DOSE) 0.7 milliLiter(s) IntraMuscular once  isosorbide   mononitrate ER Tablet (IMDUR) 30 milliGRAM(s) Oral daily  lisinopril 20 milliGRAM(s) Oral daily    MEDICATIONS  (PRN):  heparin   Injectable 4400 Unit(s) IV Push every 6 hours PRN For aPTT less than 40  LORazepam   Injectable 0.25 milliGRAM(s) IV Push once PRN Anxiety      Allergies    No Known Allergies    Intolerances        REVIEW OF SYSTEMS:  CONSTITUTIONAL: No fever or chills  HEENT:  No headache, no sore throat  RESPIRATORY: No cough, wheezing, or shortness of breath  CARDIOVASCULAR: No chest pain, palpitations  GASTROINTESTINAL: No abd pain, nausea, vomiting, or diarrhea  GENITOURINARY: No dysuria, frequency, or hematuria  NEUROLOGICAL: no focal weakness or dizziness  MUSCULOSKELETAL: no myalgias     Objective:  Vital Signs Last 24 Hrs  T(C): 36.8 (2023 08:25), Max: 37.4 (2023 12:33)  T(F): 98.2 (2023 08:25), Max: 99.3 (2023 12:33)  HR: 98 (2023 08:25) (85 - 98)  BP: 133/80 (2023 08:25) (125/72 - 155/88)  BP(mean): --  RR: 20 (2023 08:25) (16 - 20)  SpO2: 99% (2023 08:25) (97% - 99%)    Parameters below as of 2023 08:25  Patient On (Oxygen Delivery Method): room air      PE:  GENERAL: NAD, lying in bed comfortably  HEAD:  Atraumatic, Normocephalic  EYES: EOMI, conjunctiva and sclera clear  ENT: Moist mucous membranes  NECK: Supple, No JVD  CHEST/LUNG: Clear to auscultation bilaterally; No rales, rhonchi, wheezing, or rubs. Unlabored respirations  HEART: Regular rate and rhythm; No murmurs, rubs, or gallops  ABDOMEN: Soft, Nontender, Nondistended. No hepatomegaly  EXTREMITIES:  No clubbing, cyanosis, or edema  NERVOUS SYSTEM:  Alert & Oriented X3, speech clear. No gross deficits appreciated.    LABS:                        9.3    8.18  )-----------( 509      ( 2023 09:35 )             30.0     CBC Full  -  ( 2023 09:35 )  WBC Count : 8.18 K/uL  Hemoglobin : 9.3 g/dL  Hematocrit : 30.0 %  Platelet Count - Automated : 509 K/uL  Mean Cell Volume : 80.2 fl  Mean Cell Hemoglobin : 24.9 pg  Mean Cell Hemoglobin Concentration : 31.0 gm/dL  Auto Neutrophil # : x  Auto Lymphocyte # : x  Auto Monocyte # : x  Auto Eosinophil # : x  Auto Basophil # : x  Auto Neutrophil % : x  Auto Lymphocyte % : x  Auto Monocyte % : x  Auto Eosinophil % : x  Auto Basophil % : x    2023 06:45    138    |  103    |  15     ----------------------------<  146    3.6     |  26     |  0.88     Ca    9.3        2023 06:45    TPro  8.2    /  Alb  1.9    /  TBili  0.3    /  DBili  x      /  AST  79     /  ALT  111    /  AlkPhos  132    2023 06:45    PT/INR - ( 2023 19:16 )   PT: 14.7 sec;   INR: 1.25 ratio         PTT - ( 2023 09:35 )  PTT:52.8 sec  Urinalysis Basic - ( 2023 18:18 )    Color: Yellow / Appearance: Clear / S.010 / pH: x  Gluc: x / Ketone: Negative  / Bili: Negative / Urobili: Negative   Blood: x / Protein: 100 / Nitrite: Negative   Leuk Esterase: Negative / RBC: 0-2 /HPF / WBC 3-5   Sq Epi: x / Non Sq Epi: Occasional / Bacteria: x      CAPILLARY BLOOD GLUCOSE              RADIOLOGY & ADDITIONAL TESTS:    Personally reviewed.     Consultant(s) Notes Reviewed:  [x] YES  [ ] NO

## 2023-01-05 NOTE — PROGRESS NOTE ADULT - ASSESSMENT
77 year old male with history of CABG in 1995, S/P stent x1 placement in 2008, HTN, hyperlipidemia, presented to the ED with the c/o sudden onset of left-sided chest pain admitted for new LBBB and rising trops     Elevated Trops, HTN   -p/w chest pain, +elevated hTrops, peaked and downtrending.  - Patient is not complaining of any cardiac symptoms at this time  - CPK, CKMB, flat , likely demand in the setting of anemia   - EKG shows new LBBB when compared to last EKG down as out patient on 12/24/22,  repeat EKG, NSR   - CT C/A/P - Left ventricular aneurysm,Lrenal neoplasm, pending official read   - f/u 2d echo  - would continue hep gtt x 48 hours   - not optimal candidate for LHC would opt for medical management  at this time  - continue to monitor for anginal complaints   - continue ASA, statin    - BP well controlled, monitor routine hemodynamics  - continue CCB, BB, ARB with hold parameters     - anemia w/u ongoing   - GI and Hematology/oncology following     - Monitor and replete Lytes. Keep K > 4 and Mg > 2  - Will continue to follow.    Kasandra Bradley, [facility]  Nurse Practitioner - Cardiology   Spectra #[id number]/ [phone]

## 2023-01-05 NOTE — CONSULT NOTE ADULT - SUBJECTIVE AND OBJECTIVE BOX
UROLOGY CONSULT      This is a 78 y/o Male with a PMHx of         PMHx: CAD s/p CABG x 3, HTN, HLD, Anemia  SURGICAL Hx: Left inguinal hernia repair x 2 and Right inguinal hernia repair x 1, CABG  SOCIAL Hx: 50 pack year smoking history, denies marijuana use, denies illicit drug use  ALLERGIES: No Known Allergies        REVIEW OF SYSTEMS:   Head: denies headaches, dizziness & lightheadedness  Eyes: denies changes in vision, eye pain, double vision & eye discharge  Ears: denies changes in hearing & ear discharge  Nose: denies rhinorrhea  Mouth: denies bleeding gums & sore tongue & sore throat  Neck: denies swollen lymph nodes   Respiratory: denies SOB, cough, sputum production, wheezing; +Night sweats  Cardiac: denies CP & irregular heart beat, had chest pain/burning yesterday  Abdominal: denies abdominal pain, change in bowel movements  : denies dysuria, frequent urination, hematuria  Musculoskeletal: denies joint pain & muscle pain  Neuro: denies involuntary muscle movements  Psych: AAO x 3, no depression, no anxiety      MEDICATIONS:  amLODIPine   Tablet 10 milliGRAM(s) Oral daily  aspirin  chewable 81 milliGRAM(s) Oral daily  clopidogrel Tablet 75 milliGRAM(s) Oral daily  ezetimibe 10 milliGRAM(s) Oral daily  heparin   Injectable 4400 Unit(s) IV Push every 6 hours PRN  heparin  Infusion.  Unit(s)/Hr IV Continuous <Continuous>  influenza  Vaccine (HIGH DOSE) 0.7 milliLiter(s) IntraMuscular once  isosorbide   mononitrate ER Tablet (IMDUR) 30 milliGRAM(s) Oral daily  lisinopril 20 milliGRAM(s) Oral daily  LORazepam   Injectable 0.25 milliGRAM(s) IV Push once PRN        VITAL SIGNS:  T(C): 36.8 (23 @ 08:25), Max: 36.9 (23 @ 22:15)  HR: 98 (23 @ 08:25) (85 - 98)  BP: 133/80 (23 @ 08:25) (125/72 - 136/72)  RR: 20 (23 @ 08:25) (16 - 20)  SpO2: 99% (23 @ 08:25) (97% - 99%)      LABS:                                9.3    8.18  )-----------( 509      ( 2023 09:35 )               30.0         138  |  103  |  15  ----------------------------<  146<H>  3.6   |  26  |  0.88    Ca    9.3      2023 06:45    TPro  8.2  /  Alb  1.9<L>  /  TBili  0.3  /  DBili  x   /  AST  79<H>  /  ALT  111<H>  /  AlkPhos  132<H>        Urinalysis Basic - ( 2023 18:18 )  Color: Yellow / Appearance: Clear / S.010 / pH: x  Gluc: x / Ketone: Negative  / Bili: Negative / Urobili: Negative   Blood: x / Protein: 100 / Nitrite: Negative   Leuk Esterase: Negative / RBC: 0-2 /HPF / WBC 3-5   Sq Epi: x / Non Sq Epi: Occasional / Bacteria: x        IMAGING:    CT Abdomen and Pelvis w/ IV Cont (23 @ 10:39)  CONTRAST/COMPLICATIONS:  IV Contrast: Omnipaque 350 (accession [id number]), IV contrast documented   in unlinked concurrent exam (accession [id number])  74 cc administered   (accession [id number]), 0 cc administered (accession [id number])   26 cc   discarded (accession [id number]), 0 cc discarded (accession [id number])  Oral Contrast: NONE  Complications: None reported at time of study completion    PROCEDURE:  CT Angiography of the Chest wasperformed followed by portal venous phase   imaging of the Abdomen and Pelvis.  Sagittal and coronal reformats were performed as well as 3D (MIP)   reconstructions.    FINDINGS:    CHEST:  LUNGS AND LARGE AIRWAYS: Singulair as are patent. No large focal   consolidation. Minimal scattered groundglass. Left lower lobe 12 mm   nodule image 65 series 2 adjacent to pulmonary artery branch. This is   concerning for metastatic disease. Correlate with PET/CT. Few additional   peripheral subcentimeter nodules measuring up to 4 mm of the left lower   lobe on image 69 series 2, of unclear significance.  PLEURA: No pleural effusion or pneumothorax  VESSELS: No pulmonary embolus. Main pulmonary artery size is within   normal limits.  HEART: Heart size is mildly enlarged, post CABG. LV apical thinning with   1.7 x 0.6 cm aneurysm versus diverticulum, image 76 series 2. Correlate   with echocardiogram. No pericardial effusion.  MEDIASTINUM AND DARA: Small volume lymph nodes. Esophagus is nondistended.  CHEST WALL AND LOWER NECK: No chest wall hematoma. Visualized thyroid is   suboptimally assessed due to streak artifact.    ABDOMEN AND PELVIS:  LIVER: Liver size within normal limits  BILE DUCTS: No distention  GALLBLADDER: Probable fundal adenomyomatosis  SPLEEN: Spleen size borderline enlarged, 13 cm in craniocaudal dimension  PANCREAS: No acute peripancreatic inflammation  ADRENALS: Unremarkable adrenals  KIDNEYS/URETERS: Left renal large heterogeneous hypervascular mass   concerning for renal cell neoplasm, measuring 11.1 x 9.6 x 12.4 cm with   multiple areas of internal necrosis and numerous parasitic surrounding   vessels. The mass is partially endophytic and mostly exophytic. No   hydronephrosis of the kidneys. Additional left renal cyst. Subcentimeter   hypodensities too small to characterize.    BLADDER: Underdistended  REPRODUCTIVE ORGANS: Prostate is enlarged. Question left varicocele.    BOWEL: Limited evaluation due to lack of oral contrast. Stomach is   underdistended. No small bowel distention. Appendix is unremarkable.   Mild-to-moderate apical limits evaluation of the colonic mucosa.   Left-sided large renal mass abuts the left-sided colon and demonstrates   mass effect on the mesentery.  PERITONEUM: No ascites  VESSELS: No abdominal aortic aneurysm. Aortoiliac atheromatous changes.  RETROPERITONEUM/LYMPH NODES: Small volume nodes.  ABDOMINAL WALL: Right posterior gluteal superficial soft tissue nodule   measures 2.4 cm. A similar finding was reported on prior CT from ,   though no images are available for comparison. If this finding is stable   over a long period time, sebaceous cyst is considered.  BONES: Also, 1.5 x 1.4 cm lucent lesion of L4 vertebral body concerning   for metastatic disease. Additional nonspecific heterogeneity of the   pelvis. Recommend bone scan versus MRI for further evaluation. Multilevel   degenerative changes of the bones. Median sternotomy.    IMPRESSION:    No pulmonary embolus or large focal consolidation.    Post CABG. LV apical thinning with 1.7 x 0.6 cm aneurysm versus   diverticulum. Small aortic valve calcification. Correlate with   echocardiogram. Dr. Garnett discussed these findings with Dr. Wilmer Henderson on 2023 at 11:54AM, with read back.    Left renal large heterogeneous hypervascular mass concerning for renal   cell neoplasm, measuring 11.1 x 9.6 x 12.4 cm. Left lower lobe pulmonary   nodule measuring 1.2 cm adjacent to a pulmonary artery branch, concerning   metastatic disease. PET/CT and oncology evaluation are recommended for   further evaluation. Dr. Garnett discussed these findings with Dr. Wilmer Henderson on 2023 at 11:54AM, with read back.    Also, 1.5 x 1.4 cm lucent lesion of L4 vertebral body concerning for   metastatic disease. Additional nonspecific heterogeneity of the pelvis.   Recommend bone scan versus MRI for further evaluation.        PHYSICAL EXAM:  General: No acute distress, alert, cooperative, well developed, well groomed, well nourished, not cachetic   Skin: no jaundice, moist, normal texture, good skin turgor  Head: Normocephalic  Eyes: PERRL, EOMI, vision grossly intact  Neck: supple, No JVD, No lymphadenopathy, no Virchow's node  Pulmonary: good respiratory effort, no accessory muscle use, no intercostal retractions, clear to auscultation without any wheezes, rales or rhonchi, trachea is midline  Cardiac: normal S1 & S2, normal rate & rhythm, no murmurs appreciated, no peripheral edema, cyanosis or pallor. Extremities are warm & well perfused  Abdomen: soft, non tender, non-distended, no rebound, no guarding, +bowel sounds, no CVA tenderness bilaterally  Extremities: no gross joint deformities or abnormalities, no edema, peripheral pulses (including radial, DP & PT) intact, no varicosities  Psych: AAO x 3, not anxious, normal affect   UROLOGY CONSULT      This is a 78 y/o Male with a PMHx of CAD s/p CABG x 3 on ASA, HTN, HLD and Left carotid stenosis who was scheduled for a Left CEA but in the setting a new anemia & night sweats was sent to [facility] for a work up by Dr. Schultz (cardiology) before doing a stress test. He presented to the ED with burning-like chest pain that acutely began yesterday around 1 am. He reports night sweats for the previous few weeks but denied headaches, lightheadedness, dizziness and SOB. He had known about the newly diagnosed anemia but denies any known bleeding.     In the ED he was found to have a Hgb of 8.6. On CT he was found to have a 11 x 10 x 12cm Left renal mass concerning for RCC as well as a LLL mass and bony lesion concerning for metastatic disease.         PMHx: CAD s/p CABG x 3, HTN, HLD, Anemia  SURGICAL Hx: Left inguinal hernia repair x 2 and Right inguinal hernia repair x 1, CABG  SOCIAL Hx: 50 pack year smoking history, denies marijuana use, denies illicit drug use  ALLERGIES: No Known Allergies        REVIEW OF SYSTEMS:   Head: denies headaches, dizziness & lightheadedness  Eyes: denies changes in vision, eye pain, double vision & eye discharge  Ears: denies changes in hearing & ear discharge  Nose: denies rhinorrhea  Mouth: denies bleeding gums & sore tongue & sore throat  Neck: denies swollen lymph nodes   Respiratory: denies SOB, cough, sputum production, wheezing; +Night sweats  Cardiac: denies CP & irregular heart beat, had chest pain/burning yesterday  Abdominal: denies abdominal pain, change in bowel movements  : denies dysuria, frequent urination, hematuria  Musculoskeletal: denies joint pain & muscle pain  Neuro: denies involuntary muscle movements  Psych: AAO x 3, no depression, no anxiety      MEDICATIONS:  amLODIPine   Tablet 10 milliGRAM(s) Oral daily  aspirin  chewable 81 milliGRAM(s) Oral daily  clopidogrel Tablet 75 milliGRAM(s) Oral daily  ezetimibe 10 milliGRAM(s) Oral daily  heparin   Injectable 4400 Unit(s) IV Push every 6 hours PRN  heparin  Infusion.  Unit(s)/Hr IV Continuous <Continuous>  influenza  Vaccine (HIGH DOSE) 0.7 milliLiter(s) IntraMuscular once  isosorbide   mononitrate ER Tablet (IMDUR) 30 milliGRAM(s) Oral daily  lisinopril 20 milliGRAM(s) Oral daily  LORazepam   Injectable 0.25 milliGRAM(s) IV Push once PRN        VITAL SIGNS:  T(C): 36.8 (23 @ 08:25), Max: 36.9 (23 @ 22:15)  HR: 98 (23 @ 08:25) (85 - 98)  BP: 133/80 (23 @ 08:25) (125/72 - 136/72)  RR: 20 (23 @ 08:25) (16 - 20)  SpO2: 99% (23 @ 08:25) (97% - 99%)      LABS:                                9.3    8.18  )-----------( 509      ( 2023 09:35 )               30.0         138  |  103  |  15  ----------------------------<  146<H>  3.6   |  26  |  0.88    Ca    9.3      2023 06:45    TPro  8.2  /  Alb  1.9<L>  /  TBili  0.3  /  DBili  x   /  AST  79<H>  /  ALT  111<H>  /  AlkPhos  132<H>        Urinalysis Basic - ( 2023 18:18 )  Color: Yellow / Appearance: Clear / S.010 / pH: x  Gluc: x / Ketone: Negative  / Bili: Negative / Urobili: Negative   Blood: x / Protein: 100 / Nitrite: Negative   Leuk Esterase: Negative / RBC: 0-2 /HPF / WBC 3-5   Sq Epi: x / Non Sq Epi: Occasional / Bacteria: x        IMAGING:    CT Abdomen and Pelvis w/ IV Cont (23 @ 10:39)  CONTRAST/COMPLICATIONS:  IV Contrast: Omnipaque 350 (accession [id number]), IV contrast documented   in unlinked concurrent exam (accession [id number])  74 cc administered   (accession [id number]), 0 cc administered (accession [id number])   26 cc   discarded (accession [id number]), 0 cc discarded (accession [id number])  Oral Contrast: NONE  Complications: None reported at time of study completion    PROCEDURE:  CT Angiography of the Chest wasperformed followed by portal venous phase   imaging of the Abdomen and Pelvis.  Sagittal and coronal reformats were performed as well as 3D (MIP)   reconstructions.    FINDINGS:    CHEST:  LUNGS AND LARGE AIRWAYS: Singulair as are patent. No large focal   consolidation. Minimal scattered groundglass. Left lower lobe 12 mm   nodule image 65 series 2 adjacent to pulmonary artery branch. This is   concerning for metastatic disease. Correlate with PET/CT. Few additional   peripheral subcentimeter nodules measuring up to 4 mm of the left lower   lobe on image 69 series 2, of unclear significance.  PLEURA: No pleural effusion or pneumothorax  VESSELS: No pulmonary embolus. Main pulmonary artery size is within   normal limits.  HEART: Heart size is mildly enlarged, post CABG. LV apical thinning with   1.7 x 0.6 cm aneurysm versus diverticulum, image 76 series 2. Correlate   with echocardiogram. No pericardial effusion.  MEDIASTINUM AND DARA: Small volume lymph nodes. Esophagus is nondistended.  CHEST WALL AND LOWER NECK: No chest wall hematoma. Visualized thyroid is   suboptimally assessed due to streak artifact.    ABDOMEN AND PELVIS:  LIVER: Liver size within normal limits  BILE DUCTS: No distention  GALLBLADDER: Probable fundal adenomyomatosis  SPLEEN: Spleen size borderline enlarged, 13 cm in craniocaudal dimension  PANCREAS: No acute peripancreatic inflammation  ADRENALS: Unremarkable adrenals  KIDNEYS/URETERS: Left renal large heterogeneous hypervascular mass   concerning for renal cell neoplasm, measuring 11.1 x 9.6 x 12.4 cm with   multiple areas of internal necrosis and numerous parasitic surrounding   vessels. The mass is partially endophytic and mostly exophytic. No   hydronephrosis of the kidneys. Additional left renal cyst. Subcentimeter   hypodensities too small to characterize.    BLADDER: Underdistended  REPRODUCTIVE ORGANS: Prostate is enlarged. Question left varicocele.    BOWEL: Limited evaluation due to lack of oral contrast. Stomach is   underdistended. No small bowel distention. Appendix is unremarkable.   Mild-to-moderate apical limits evaluation of the colonic mucosa.   Left-sided large renal mass abuts the left-sided colon and demonstrates   mass effect on the mesentery.  PERITONEUM: No ascites  VESSELS: No abdominal aortic aneurysm. Aortoiliac atheromatous changes.  RETROPERITONEUM/LYMPH NODES: Small volume nodes.  ABDOMINAL WALL: Right posterior gluteal superficial soft tissue nodule   measures 2.4 cm. A similar finding was reported on prior CT from ,   though no images are available for comparison. If this finding is stable   over a long period time, sebaceous cyst is considered.  BONES: Also, 1.5 x 1.4 cm lucent lesion of L4 vertebral body concerning   for metastatic disease. Additional nonspecific heterogeneity of the   pelvis. Recommend bone scan versus MRI for further evaluation. Multilevel   degenerative changes of the bones. Median sternotomy.    IMPRESSION:    No pulmonary embolus or large focal consolidation.    Post CABG. LV apical thinning with 1.7 x 0.6 cm aneurysm versus   diverticulum. Small aortic valve calcification. Correlate with   echocardiogram. Dr. Garnett discussed these findings with Dr. Wilmer Henderson on 2023 at 11:54AM, with read back.    Left renal large heterogeneous hypervascular mass concerning for renal   cell neoplasm, measuring 11.1 x 9.6 x 12.4 cm. Left lower lobe pulmonary   nodule measuring 1.2 cm adjacent to a pulmonary artery branch, concerning   metastatic disease. PET/CT and oncology evaluation are recommended for   further evaluation. Dr. Garnett discussed these findings with Dr. Wilmer Henderson on 2023 at 11:54AM, with read back.    Also, 1.5 x 1.4 cm lucent lesion of L4 vertebral body concerning for   metastatic disease. Additional nonspecific heterogeneity of the pelvis.   Recommend bone scan versus MRI for further evaluation.        PHYSICAL EXAM:  General: No acute distress, alert, cooperative, well developed, well groomed, well nourished, not cachetic   Skin: no jaundice, moist, normal texture, good skin turgor  Head: Normocephalic  Eyes: PERRL, EOMI, vision grossly intact  Neck: supple, No JVD, No lymphadenopathy, no Virchow's node  Pulmonary: good respiratory effort, no accessory muscle use, no intercostal retractions, clear to auscultation without any wheezes, rales or rhonchi, trachea is midline  Cardiac: normal S1 & S2, normal rate & rhythm, no murmurs appreciated, no peripheral edema, cyanosis or pallor. Extremities are warm & well perfused  Abdomen: soft, non tender, non-distended, no rebound, no guarding, +bowel sounds, no CVA tenderness bilaterally  Extremities: no gross joint deformities or abnormalities, no edema, peripheral pulses (including radial, DP & PT) intact, no varicosities  Psych: AAO x 3, not anxious, normal affect   UROLOGY CONSULT      This is a 76 y/o Male with a PMHx of CAD s/p CABG x 3 on ASA, HTN, HLD and Left carotid stenosis who was scheduled for a Left CEA but in the setting a new anemia & night sweats was sent to [facility] for a work up by Dr. Schultz (cardiology) before doing a stress test. He presented to the ED with burning-like chest pain that acutely began yesterday around 1 am. He reports night sweats for the previous few weeks but denied headaches, lightheadedness, dizziness and SOB. He had known about the newly diagnosed anemia but denies any known bleeding.     In the ED he was found to have a Hgb of 8.6. On CT he was found to have a 11 x 10 x 12cm Left renal mass concerning for RCC as well as a LLL mass and bony lesion concerning for metastatic disease.         PMHx: CAD s/p CABG x 3, HTN, HLD, Anemia  SURGICAL Hx: Left inguinal hernia repair x 2 and Right inguinal hernia repair x 1, CABG  SOCIAL Hx: 50 pack year smoking history, denies marijuana use, denies illicit drug use  ALLERGIES: No Known Allergies        REVIEW OF SYSTEMS:   Head: denies headaches, dizziness & lightheadedness  Eyes: denies changes in vision, eye pain, double vision & eye discharge  Ears: denies changes in hearing & ear discharge  Nose: denies rhinorrhea  Mouth: denies bleeding gums & sore tongue & sore throat  Neck: denies swollen lymph nodes   Respiratory: denies SOB, cough, sputum production, wheezing; +Night sweats  Cardiac: denies CP & irregular heart beat, had chest pain/burning yesterday  Abdominal: denies abdominal pain, change in bowel movements  : denies dysuria, frequent urination, hematuria  Musculoskeletal: denies joint pain & muscle pain  Neuro: denies involuntary muscle movements  Psych: AAO x 3, no depression, no anxiety      MEDICATIONS:  amLODIPine   Tablet 10 milliGRAM(s) Oral daily  aspirin  chewable 81 milliGRAM(s) Oral daily  clopidogrel Tablet 75 milliGRAM(s) Oral daily  ezetimibe 10 milliGRAM(s) Oral daily  heparin   Injectable 4400 Unit(s) IV Push every 6 hours PRN  heparin  Infusion.  Unit(s)/Hr IV Continuous <Continuous>  influenza  Vaccine (HIGH DOSE) 0.7 milliLiter(s) IntraMuscular once  isosorbide   mononitrate ER Tablet (IMDUR) 30 milliGRAM(s) Oral daily  lisinopril 20 milliGRAM(s) Oral daily  LORazepam   Injectable 0.25 milliGRAM(s) IV Push once PRN        VITAL SIGNS:  T(C): 36.8 (23 @ 08:25), Max: 36.9 (23 @ 22:15)  HR: 98 (23 @ 08:25) (85 - 98)  BP: 133/80 (23 @ 08:25) (125/72 - 136/72)  RR: 20 (23 @ 08:25) (16 - 20)  SpO2: 99% (23 @ 08:25) (97% - 99%)      LABS:                                9.3    8.18  )-----------( 509      ( 2023 09:35 )               30.0         138  |  103  |  15  ----------------------------<  146<H>  3.6   |  26  |  0.88    Ca    9.3      2023 06:45    TPro  8.2  /  Alb  1.9<L>  /  TBili  0.3  /  DBili  x   /  AST  79<H>  /  ALT  111<H>  /  AlkPhos  132<H>        Urinalysis Basic - ( 2023 18:18 )  Color: Yellow / Appearance: Clear / S.010 / pH: x  Gluc: x / Ketone: Negative  / Bili: Negative / Urobili: Negative   Blood: x / Protein: 100 / Nitrite: Negative   Leuk Esterase: Negative / RBC: 0-2 /HPF / WBC 3-5   Sq Epi: x / Non Sq Epi: Occasional / Bacteria: x        IMAGING:    CT Abdomen and Pelvis w/ IV Cont (23 @ 10:39)  CONTRAST/COMPLICATIONS:  IV Contrast: Omnipaque 350 (accession [id number]), IV contrast documented   in unlinked concurrent exam (accession [id number])  74 cc administered   (accession [id number]), 0 cc administered (accession [id number])   26 cc   discarded (accession [id number]), 0 cc discarded (accession [id number])  Oral Contrast: NONE  Complications: None reported at time of study completion    PROCEDURE:  CT Angiography of the Chest wasperformed followed by portal venous phase   imaging of the Abdomen and Pelvis.  Sagittal and coronal reformats were performed as well as 3D (MIP)   reconstructions.    FINDINGS:    CHEST:  LUNGS AND LARGE AIRWAYS: Singulair as are patent. No large focal   consolidation. Minimal scattered groundglass. Left lower lobe 12 mm   nodule image 65 series 2 adjacent to pulmonary artery branch. This is   concerning for metastatic disease. Correlate with PET/CT. Few additional   peripheral subcentimeter nodules measuring up to 4 mm of the left lower   lobe on image 69 series 2, of unclear significance.  PLEURA: No pleural effusion or pneumothorax  VESSELS: No pulmonary embolus. Main pulmonary artery size is within   normal limits.  HEART: Heart size is mildly enlarged, post CABG. LV apical thinning with   1.7 x 0.6 cm aneurysm versus diverticulum, image 76 series 2. Correlate   with echocardiogram. No pericardial effusion.  MEDIASTINUM AND DARA: Small volume lymph nodes. Esophagus is nondistended.  CHEST WALL AND LOWER NECK: No chest wall hematoma. Visualized thyroid is   suboptimally assessed due to streak artifact.    ABDOMEN AND PELVIS:  LIVER: Liver size within normal limits  BILE DUCTS: No distention  GALLBLADDER: Probable fundal adenomyomatosis  SPLEEN: Spleen size borderline enlarged, 13 cm in craniocaudal dimension  PANCREAS: No acute peripancreatic inflammation  ADRENALS: Unremarkable adrenals  KIDNEYS/URETERS: Left renal large heterogeneous hypervascular mass   concerning for renal cell neoplasm, measuring 11.1 x 9.6 x 12.4 cm with   multiple areas of internal necrosis and numerous parasitic surrounding   vessels. The mass is partially endophytic and mostly exophytic. No   hydronephrosis of the kidneys. Additional left renal cyst. Subcentimeter   hypodensities too small to characterize.    BLADDER: Underdistended  REPRODUCTIVE ORGANS: Prostate is enlarged. Question left varicocele.    BOWEL: Limited evaluation due to lack of oral contrast. Stomach is   underdistended. No small bowel distention. Appendix is unremarkable.   Mild-to-moderate apical limits evaluation of the colonic mucosa.   Left-sided large renal mass abuts the left-sided colon and demonstrates   mass effect on the mesentery.  PERITONEUM: No ascites  VESSELS: No abdominal aortic aneurysm. Aortoiliac atheromatous changes.  RETROPERITONEUM/LYMPH NODES: Small volume nodes.  ABDOMINAL WALL: Right posterior gluteal superficial soft tissue nodule   measures 2.4 cm. A similar finding was reported on prior CT from ,   though no images are available for comparison. If this finding is stable   over a long period time, sebaceous cyst is considered.  BONES: Also, 1.5 x 1.4 cm lucent lesion of L4 vertebral body concerning   for metastatic disease. Additional nonspecific heterogeneity of the   pelvis. Recommend bone scan versus MRI for further evaluation. Multilevel   degenerative changes of the bones. Median sternotomy.    IMPRESSION:    No pulmonary embolus or large focal consolidation.    Post CABG. LV apical thinning with 1.7 x 0.6 cm aneurysm versus   diverticulum. Small aortic valve calcification. Correlate with   echocardiogram. Dr. Garnett discussed these findings with Dr. Wilmer Henderson on 2023 at 11:54AM, with read back.    Left renal large heterogeneous hypervascular mass concerning for renal   cell neoplasm, measuring 11.1 x 9.6 x 12.4 cm. Left lower lobe pulmonary   nodule measuring 1.2 cm adjacent to a pulmonary artery branch, concerning   metastatic disease. PET/CT and oncology evaluation are recommended for   further evaluation. Dr. Garnett discussed these findings with Dr. Wilmer Henderson on 2023 at 11:54AM, with read back.    Also, 1.5 x 1.4 cm lucent lesion of L4 vertebral body concerning for   metastatic disease. Additional nonspecific heterogeneity of the pelvis.   Recommend bone scan versus MRI for further evaluation.        PHYSICAL EXAM:  General: No acute distress, alert, cooperative, well developed, well groomed, well nourished, not cachetic   Skin: no jaundice, moist, normal texture, good skin turgor  Head: Normocephalic  Eyes: PERRL, EOMI, vision grossly intact  Neck: supple, No JVD, No lymphadenopathy, no Virchow's node  Pulmonary: good respiratory effort, no accessory muscle use, no intercostal retractions, clear to auscultation without any wheezes, rales or rhonchi, trachea is midline  Cardiac: normal S1 & S2, normal rate & rhythm, no murmurs appreciated, no peripheral edema, cyanosis or pallor. Extremities are warm & well perfused  Abdomen: soft, non tender, non-distended, no rebound, no guarding, +bowel sounds, no CVA tenderness bilaterally  Extremities: no gross joint deformities or abnormalities, no edema, peripheral pulses (including radial, DP & PT) intact, no varicosities  Psych: AAO x 3, not anxious, normal affect

## 2023-01-05 NOTE — ED ADULT NURSE REASSESSMENT NOTE - NS ED NURSE REASSESS COMMENT FT1
Patient is currently lying down on the stretcher, attached to continuos monitoring, sleeping in NAD. He denies any pain and states that he is not comfortable sleeping on the stretcher and would like to know when he is going to a room on the unit. Charge nurse made aware. Patient has ordered heparin medication actively infusing. 450cc of urine was emptied from patient's urinal throughout the evening. Morning meds administered, awaiting further Md evaluations, will continue to monitor, safety maintained.

## 2023-01-05 NOTE — PROGRESS NOTE ADULT - PROBLEM SELECTOR PLAN 2
CT C/A/P    - L renal neoplasm [id number] cm    - LLL nodule concern for mets    - L4 lucent lesion c/f bone mets 1.5 x 1.4 cm  Rads rec bone scan vs MRI for further evaluation  Heme/onc consulted f/u recs

## 2023-01-05 NOTE — CONSULT NOTE ADULT - CONSULT REASON
evaluation of renal mass C64.2
Chest Pain
Large Left Renal Mass & Microscopic Hematuria
Anemia due to chronic disease  Anemia due to neoplasm  MGUS  Night sweats  Large hypervascular L renal mass
anemia

## 2023-01-05 NOTE — PROGRESS NOTE ADULT - SUBJECTIVE AND OBJECTIVE BOX
DOCUMENTATION IN PROGRESS        [facility] -- Semaj Damon,  Luis, Yaw Pool Savella, Goodger  Office # [phone]    Follow Up:  Fernando pain     Subjective/Observations:     REVIEW OF SYSTEMS: All other review of systems is negative unless indicated above  PAST MEDICAL & SURGICAL HISTORY:  HTN - Hypertension      H/O hyperlipidemia      CAD, multiple vessel      Renal colic      Hypertension      S/P CABG x 3  1995      S/P coronary artery stent placement  2 stents - 3 years ago      S/P knee surgery  arthroscopic b/l      S/P hernia repair      S/P CABG x 4      S/P coronary artery stent placement        MEDICATIONS  (STANDING):  amLODIPine   Tablet 10 milliGRAM(s) Oral daily  aspirin  chewable 81 milliGRAM(s) Oral daily  clopidogrel Tablet 75 milliGRAM(s) Oral daily  ezetimibe 10 milliGRAM(s) Oral daily  heparin  Infusion.  Unit(s)/Hr (9 mL/Hr) IV Continuous <Continuous>  influenza  Vaccine (HIGH DOSE) 0.7 milliLiter(s) IntraMuscular once  isosorbide   mononitrate ER Tablet (IMDUR) 30 milliGRAM(s) Oral daily  lisinopril 20 milliGRAM(s) Oral daily    MEDICATIONS  (PRN):  heparin   Injectable 4400 Unit(s) IV Push every 6 hours PRN For aPTT less than 40  LORazepam   Injectable 0.25 milliGRAM(s) IV Push once PRN Anxiety    Allergies    No Known Allergies    Intolerances      Vital Signs Last 24 Hrs  T(C): 36.8 (05 Jan 2023 08:25), Max: 37.4 (04 Jan 2023 12:33)  T(F): 98.2 (05 Jan 2023 08:25), Max: 99.3 (04 Jan 2023 12:33)  HR: 98 (05 Jan 2023 08:25) (85 - 98)  BP: 133/80 (05 Jan 2023 08:25) (125/72 - 155/88)  BP(mean): --  RR: 20 (05 Jan 2023 08:25) (16 - 20)  SpO2: 99% (05 Jan 2023 08:25) (97% - 99%)    Parameters below as of 05 Jan 2023 08:25  Patient On (Oxygen Delivery Method): room air      I&O's Summary      TELE:   PHYSICAL EXAM:  Constitutional: NAD, awake and alert, well-developed  HEENT: Moist Mucous Membranes, Anicteric  Pulmonary: Non-labored, breath sounds are clear bilaterally, No wheezing, rales or rhonchi  Cardiovascular: Regular, S1 and S2, No murmurs, rubs, gallops or clicks  Gastrointestinal: Bowel Sounds present, soft, nontender.   Lymph: No peripheral edema. No lymphadenopathy.  Skin: No visible rashes or ulcers.  Psych:  Mood & affect appropriate  LABS: All Labs Reviewed:                        9.3    8.18  )-----------( 509      ( 05 Jan 2023 09:35 )             30.0                         8.7    8.18  )-----------( 437      ( 05 Jan 2023 06:45 )             28.4                         8.6    7.96  )-----------( 425      ( 04 Jan 2023 02:58 )             28.3     05 Jan 2023 06:45    138    |  103    |  15     ----------------------------<  146    3.6     |  26     |  0.88   04 Jan 2023 02:58    138    |  105    |  23     ----------------------------<  114    3.9     |  25     |  1.00     Ca    9.3        05 Jan 2023 06:45  Ca    9.1        04 Jan 2023 02:58    TPro  8.2    /  Alb  1.9    /  TBili  0.3    /  DBili  x      /  AST  79     /  ALT  111    /  AlkPhos  132    05 Jan 2023 06:45  TPro  8.4    /  Alb  2.0    /  TBili  0.2    /  DBili  x      /  AST  115    /  ALT  137    /  AlkPhos  147    04 Jan 2023 02:58    PT/INR - ( 04 Jan 2023 19:16 )   PT: 14.7 sec;   INR: 1.25 ratio         PTT - ( 05 Jan 2023 09:35 )  PTT:52.8 sec  CARDIAC MARKERS ( 05 Jan 2023 06:45 )  x     / x     / 49 U/L / x     / 1.8 ng/mL  CARDIAC MARKERS ( 04 Jan 2023 11:38 )  x     / x     / x     / x     / 5.7 ng/mL      12 Lead ECG:   Ventricular Rate 90 BPM    Atrial Rate 90 BPM    P-R Interval 172 ms    QRS Duration 104 ms    Q-T Interval 384 ms    QTC Calculation(Bazett) 469 ms    P Axis 58 degrees    R Axis 36 degrees    T Axis 54 degrees    Diagnosis Line Normal sinus rhythm  Possible Left atrial enlargement  Minimal voltage criteria for LVH, may be normal variant ( [place] product )  Inferior infarct (cited on or before 04-JAN-2023)  Possible Anterior infarct (cited on or before 04-JAN-2023)  Confirmed by HEIDY POOL ([id number]) on 1/4/2023 5:41:29 PM (01-04-23 @ 13:54)        [facility] -- Semaj Damon,  Luis, Yaw Pool Savella, Goodger  Office # [phone]    Follow Up:  CheSt pain     Subjective/Observations: seen and examined in ed, wife a bedside, denies chest pain , palpitaitons, sob, tolerating room air    REVIEW OF SYSTEMS: All other review of systems is negative unless indicated above  PAST MEDICAL & SURGICAL HISTORY:  HTN - Hypertension      H/O hyperlipidemia      CAD, multiple vessel      Renal colic      Hypertension      S/P CABG x 3  1995      S/P coronary artery stent placement  2 stents - 3 years ago      S/P knee surgery  arthroscopic b/l      S/P hernia repair      S/P CABG x 4      S/P coronary artery stent placement        MEDICATIONS  (STANDING):  amLODIPine   Tablet 10 milliGRAM(s) Oral daily  aspirin  chewable 81 milliGRAM(s) Oral daily  clopidogrel Tablet 75 milliGRAM(s) Oral daily  ezetimibe 10 milliGRAM(s) Oral daily  heparin  Infusion.  Unit(s)/Hr (9 mL/Hr) IV Continuous <Continuous>  influenza  Vaccine (HIGH DOSE) 0.7 milliLiter(s) IntraMuscular once  isosorbide   mononitrate ER Tablet (IMDUR) 30 milliGRAM(s) Oral daily  lisinopril 20 milliGRAM(s) Oral daily    MEDICATIONS  (PRN):  heparin   Injectable 4400 Unit(s) IV Push every 6 hours PRN For aPTT less than 40  LORazepam   Injectable 0.25 milliGRAM(s) IV Push once PRN Anxiety    Allergies    No Known Allergies    Intolerances      Vital Signs Last 24 Hrs  T(C): 36.8 (05 Jan 2023 08:25), Max: 37.4 (04 Jan 2023 12:33)  T(F): 98.2 (05 Jan 2023 08:25), Max: 99.3 (04 Jan 2023 12:33)  HR: 98 (05 Jan 2023 08:25) (85 - 98)  BP: 133/80 (05 Jan 2023 08:25) (125/72 - 155/88)  BP(mean): --  RR: 20 (05 Jan 2023 08:25) (16 - 20)  SpO2: 99% (05 Jan 2023 08:25) (97% - 99%)    Parameters below as of 05 Jan 2023 08:25  Patient On (Oxygen Delivery Method): room air      I&O's Summary      TELE: SR 80's   PHYSICAL EXAM:  Constitutional: NAD, awake and alert, well-developed  HEENT: Moist Mucous Membranes, Anicteric  Pulmonary: Non-labored, breath sounds are clear bilaterally, No wheezing, rales or rhonchi  Cardiovascular: Regular, S1 and S2, No murmurs, rubs, gallops or clicks  Gastrointestinal: Bowel Sounds present, soft, nontender.   Lymph: No peripheral edema. No lymphadenopathy.  Skin: No visible rashes or ulcers.  Psych:  Mood & affect appropriate  LABS: All Labs Reviewed:                        9.3    8.18  )-----------( 509      ( 05 Jan 2023 09:35 )             30.0                         8.7    8.18  )-----------( 437      ( 05 Jan 2023 06:45 )             28.4                         8.6    7.96  )-----------( 425      ( 04 Jan 2023 02:58 )             28.3     05 Jan 2023 06:45    138    |  103    |  15     ----------------------------<  146    3.6     |  26     |  0.88   04 Jan 2023 02:58    138    |  105    |  23     ----------------------------<  114    3.9     |  25     |  1.00     Ca    9.3        05 Jan 2023 06:45  Ca    9.1        04 Jan 2023 02:58    TPro  8.2    /  Alb  1.9    /  TBili  0.3    /  DBili  x      /  AST  79     /  ALT  111    /  AlkPhos  132    05 Jan 2023 06:45  TPro  8.4    /  Alb  2.0    /  TBili  0.2    /  DBili  x      /  AST  115    /  ALT  137    /  AlkPhos  147    04 Jan 2023 02:58    PT/INR - ( 04 Jan 2023 19:16 )   PT: 14.7 sec;   INR: 1.25 ratio         PTT - ( 05 Jan 2023 09:35 )  PTT:52.8 sec  CARDIAC MARKERS ( 05 Jan 2023 06:45 )  x     / x     / 49 U/L / x     / 1.8 ng/mL  CARDIAC MARKERS ( 04 Jan 2023 11:38 )  x     / x     / x     / x     / 5.7 ng/mL      12 Lead ECG:   Ventricular Rate 90 BPM    Atrial Rate 90 BPM    P-R Interval 172 ms    QRS Duration 104 ms    Q-T Interval 384 ms    QTC Calculation(Bazett) 469 ms    P Axis 58 degrees    R Axis 36 degrees    T Axis 54 degrees    Diagnosis Line Normal sinus rhythm  Possible Left atrial enlargement  Minimal voltage criteria for LVH, may be normal variant ( [place] product )  Inferior infarct (cited on or before 04-JAN-2023)  Possible Anterior infarct (cited on or before 04-JAN-2023)  Confirmed by HEIDY POOL ([id number]) on 1/4/2023 5:41:29 PM (01-04-23 @ 13:54)

## 2023-01-05 NOTE — PROGRESS NOTE ADULT - ASSESSMENT
anemia  chest pain  abnormal CT     reg diet  CT scan noted  will need onc and urology eval  no overt gi bleeding no objection to asa given elevated troponin, cardiology following  cbc daily    I reviewed the overnight course of events on the unit, re-confirming the patient history. I discussed the care with the patient and their family  The plan of care was discussed with the physician assistant and modifications were made to the notation where appropriate.   Differential diagnosis and plan of care discussed with patient after the evaluation  35 minutes spent on total encounter of which more than fifty percent of the encounter was spent counseling and/or coordinating care by the attending physician.  Advanced care planning was discussed with patient and family.  Advanced care planning forms were reviewed and discussed.  Risks, benefits and alternatives of gastroenterologic procedures were discussed in detail and all questions were answered.

## 2023-01-05 NOTE — PROGRESS NOTE ADULT - PROBLEM SELECTOR PLAN 3
Hb dropped to 8.6 form baseline of 14  - maybe related to renal mass, possible anemia of chronic disease related to an underlying renal malignancy  - will f/u Heme-onc recs  - iron studies ordered  - GI consult also called, and FOBT ordered Hb dropped to 9.3 (8.7) form baseline of 14  Recent hx of anemia being worked up outpt, pt reported UA as out patient showed proteuria and hematuria - outpt GI and urology were scheduled for next week  HB on admission 8.6, last Hb on 12/24/22 was 9.9  - Maybe related to renal mass, possible anemia of chronic disease related to an underlying renal malignancy  Heme/onc consulted recs appreciated    - Bone scan    - Will need bx  GI consulted recs appreciated

## 2023-01-05 NOTE — CONSULT NOTE ADULT - SUBJECTIVE AND OBJECTIVE BOX
Patient is a 77y old  Male who presents with a chief complaint of chest pain, r/o ACS (05 Jan 2023 13:59)      HPI:  78 y/o f w/ PMH of CAD, HTN, HLD p/w chest pain, pressure like in quality, nonradiating, beginning at 1 am last night, not related to food, called EMS, pain remitted after 45 minutes after getting aspirin 324 and nitroglyceirn per pt.  Patient was concerned it may be heart attack.  He has been having diaphoresis for gilmar last few weeks.  He has not noticed any blood in rectum or any bleeding anywhere.      In the ED, he was found to have a Hb of 8.6, with baseline appearing to be 14.  Cards evaluated pt for elevated troponin, feels it is more likely related to demand ischemia.  Radiologist called about CT c/a/p, found pt has large renal mass, and pulm nodule that may represent metastases.   (04 Jan 2023 11:46)       ROS:  Negative except for:    PAST MEDICAL & SURGICAL HISTORY:  HTN - Hypertension      H/O hyperlipidemia      CAD, multiple vessel      Renal colic      Hypertension      S/P CABG x 3  1995      S/P coronary artery stent placement  2 stents - 3 years ago      S/P knee surgery  arthroscopic b/l      S/P hernia repair      S/P CABG x 4      S/P coronary artery stent placement          SOCIAL HISTORY:    FAMILY HISTORY:  FH: cardiovascular disease (Father)        MEDICATIONS  (STANDING):  amLODIPine   Tablet 10 milliGRAM(s) Oral daily  aspirin  chewable 81 milliGRAM(s) Oral daily  clopidogrel Tablet 75 milliGRAM(s) Oral daily  ezetimibe 10 milliGRAM(s) Oral daily  heparin  Infusion.  Unit(s)/Hr (9 mL/Hr) IV Continuous <Continuous>  influenza  Vaccine (HIGH DOSE) 0.7 milliLiter(s) IntraMuscular once  isosorbide   mononitrate ER Tablet (IMDUR) 30 milliGRAM(s) Oral daily  lisinopril 20 milliGRAM(s) Oral daily    MEDICATIONS  (PRN):  heparin   Injectable 4400 Unit(s) IV Push every 6 hours PRN For aPTT less than 40  LORazepam   Injectable 0.25 milliGRAM(s) IV Push once PRN Anxiety      Allergies    No Known Allergies    Intolerances        Vital Signs Last 24 Hrs  T(C): 36.8 (05 Jan 2023 15:20), Max: 36.9 (04 Jan 2023 22:15)  T(F): 98.2 (05 Jan 2023 15:20), Max: 98.5 (04 Jan 2023 22:15)  HR: 87 (05 Jan 2023 15:20) (85 - 98)  BP: 112/55 (05 Jan 2023 15:20) (112/55 - 136/72)  BP(mean): 77 (05 Jan 2023 15:20) (77 - 77)  RR: 18 (05 Jan 2023 15:20) (18 - 20)  SpO2: 97% (05 Jan 2023 15:20) (97% - 99%)    Parameters below as of 05 Jan 2023 08:25  Patient On (Oxygen Delivery Method): room air        PHYSICAL EXAM  General: adult in NAD  HEENT: clear oropharynx, anicteric sclera, pink conjunctivae  Neck: supple  CV: normal S1S2 with no murmur rubs or gallops  Lungs: clear to auscultation, no wheezes, no rhales  Abdomen: soft non-tender non-distended, no hepato/splenomegaly  Ext: no clubbing cyanosis or edema  Skin: no rashes and no petichiae  Neuro: alert and oriented X3 no focal deficits      LABS:    CBC Full  -  ( 05 Jan 2023 09:35 )  WBC Count : 8.18 K/uL  RBC Count : 3.74 M/uL  Hemoglobin : 9.3 g/dL  Hematocrit : 30.0 %  Platelet Count - Automated : 509 K/uL  Mean Cell Volume : 80.2 fl  Mean Cell Hemoglobin : 24.9 pg  Mean Cell Hemoglobin Concentration : 31.0 gm/dL  Auto Neutrophil # : x  Auto Lymphocyte # : x  Auto Monocyte # : x  Auto Eosinophil # : x  Auto Basophil # : x  Auto Neutrophil % : x  Auto Lymphocyte % : x  Auto Monocyte % : x  Auto Eosinophil % : x  Auto Basophil % : x    01-05    138  |  103  |  15  ----------------------------<  146<H>  3.6   |  26  |  0.88    Ca    9.3      05 Jan 2023 06:45    TPro  8.2  /  Alb  1.9<L>  /  TBili  0.3  /  DBili  x   /  AST  79<H>  /  ALT  111<H>  /  AlkPhos  132<H>  01-05    PT/INR - ( 04 Jan 2023 19:16 )   PT: 14.7 sec;   INR: 1.25 ratio         PTT - ( 05 Jan 2023 09:35 )  PTT:52.8 sec  Iron - Total Binding Capacity.: 264 ug/dL (01-05 @ 06:45)  Ferritin, Serum: 860 ng/mL (01-05 @ 06:45)  Ferritin, Serum: 889 ng/mL (01-04 @ 19:16)          BLOOD SMEAR INTERPRETATION:    RADIOLOGY & ADDITIONAL STUDIES:    < from: CT Angio Chest PE Protocol w/ IV Cont (01.04.23 @ 10:40) >  ACC: [id number] EXAM:  CT ABDOMEN AND PELVIS IC                        ACC: [id number] EXAM:  CT ANGIO CHEST PULM ART [facility]                          PROCEDURE DATE:  01/04/2023          INTERPRETATION:  CLINICAL INFORMATION: Weakness, chest pain    COMPARISON: Report of prior CT from 2002. Images are not available.    CONTRAST/COMPLICATIONS:  IV Contrast: Omnipaque 350 (accession [id number]), IV contrast documented   in unlinked concurrent exam (accession [id number])  74 cc administered   (accession [id number]), 0 cc administered (accession [id number])   26 cc   discarded (accession [id number]), 0 cc discarded (accession [id number])  Oral Contrast: NONE  Complications: None reported at time of study completion    PROCEDURE:  CT Angiography of the Chest wasperformed followed by portal venous phase   imaging of the Abdomen and Pelvis.  Sagittal and coronal reformats were performed as well as 3D (MIP)   reconstructions.    FINDINGS:    CHEST:  LUNGS AND LARGE AIRWAYS: Singulair as are patent. No large focal   consolidation. Minimal scattered groundglass. Left lower lobe 12 mm   nodule image 65 series 2 adjacent to pulmonary artery branch. This is   concerning for metastatic disease. Correlate with PET/CT. Few additional   peripheral subcentimeter nodules measuring up to 4 mm of the left lower   lobe on image 69 series 2, of unclear significance.  PLEURA: No pleural effusion or pneumothorax  VESSELS: No pulmonary embolus. Main pulmonary artery size is within   normal limits.  HEART: Heart size is mildly enlarged, post CABG. LV apical thinning with   1.7 x 0.6 cm aneurysm versus diverticulum, image 76 series 2. Correlate   with echocardiogram. No pericardial effusion.  MEDIASTINUM AND DARA: Small volume lymph nodes. Esophagus is nondistended.  CHEST WALL AND LOWER NECK: No chest wall hematoma. Visualized thyroid is   suboptimally assessed due to streak artifact.    ABDOMEN AND PELVIS:  LIVER: Liver size within normal limits  BILE DUCTS: No distention  GALLBLADDER: Probable fundal adenomyomatosis  SPLEEN: Spleen size borderline enlarged, 13 cm in craniocaudal dimension  PANCREAS: No acute peripancreatic inflammation  ADRENALS: Unremarkable adrenals  KIDNEYS/URETERS: Left renal large heterogeneous hypervascular mass   concerning for renalcell neoplasm, measuring 11.1 x 9.6 x 12.4 cm with   multiple areas of internal necrosis and numerous parasitic surrounding   vessels. The mass is partially endophytic and mostly exophytic. No   hydronephrosis of the kidneys. Additional left renal cyst. Subcentimeter   hypodensities too small to characterize.    BLADDER: Underdistended  REPRODUCTIVE ORGANS: Prostate is enlarged. Question left varicocele.    BOWEL: Limited evaluation due to lack of oral contrast. Stomach is   underdistended. No small bowel distention. Appendix is unremarkable.   Mild-to-moderate apical limits evaluation of the colonic mucosa.   Left-sided large renal mass abuts the left-sided colon and demonstrates   mass effect on the mesentery.  PERITONEUM: No ascites  VESSELS: No abdominal aortic aneurysm. Aortoiliac atheromatous changes.  RETROPERITONEUM/LYMPH NODES: Small volume nodes.  ABDOMINAL WALL: Right posterior gluteal superficial soft tissue nodule   measures 2.4 cm. A similar finding was reported on prior CT from 2002,   though no images are available for comparison. If this finding is stable   over a long period time, sebaceous cyst is considered.  BONES: Also, 1.5 x 1.4 cm lucent lesion of L4 vertebral body concerning   for metastatic disease. Additional nonspecific heterogeneity of the   pelvis. Recommend bone scan versus MRI for further evaluation. Multilevel   degenerative changes of the bones. Median sternotomy.    IMPRESSION:    No pulmonary embolus or large focal consolidation.    Post CABG. LV apical thinning with 1.7 x 0.6 cm aneurysm versus   diverticulum. Small aortic valve calcification. Correlate with   echocardiogram. Dr. Garnett discussed these findings with Dr. Wilmer Henderson on 1/4/2023 at 11:54AM, with read back.    Left renal large heterogeneous hypervascular mass concerning for renal   cell neoplasm, measuring 11.1 x 9.6 x 12.4 cm. Left lower lobe pulmonary   nodule measuring 1.2 cm adjacent to a pulmonary artery branch, concerning   metastatic disease. PET/CT and oncologyevaluation are recommended for   further evaluation. Dr. Garnett discussed these findings with Dr. Wilmer Henderson on 1/4/2023 at 11:54AM, with read back.    Also, 1.5 x 1.4 cm lucent lesion of L4 vertebral body concerning for   metastatic disease. Additional nonspecific heterogeneity of the pelvis.   Recommend bone scan versus MRI for further evaluation.    --- End of Report ---            DERRICK GARNETT M.D., ATTENDING RADIOLOGIST  This document has been electronically signed. Jan 4 2023 12:09PM    < end of copied text >

## 2023-01-05 NOTE — PROGRESS NOTE ADULT - SUBJECTIVE AND OBJECTIVE BOX
[facility]  Vivek Cole PA-C  [address] [place] [address]  [phone]      INTERVAL HPI/OVERNIGHT EVENTS:    no new events     MEDICATIONS  (STANDING):  amLODIPine   Tablet 10 milliGRAM(s) Oral daily  aspirin  chewable 81 milliGRAM(s) Oral daily  clopidogrel Tablet 75 milliGRAM(s) Oral daily  ezetimibe 10 milliGRAM(s) Oral daily  heparin  Infusion.  Unit(s)/Hr (9 mL/Hr) IV Continuous <Continuous>  influenza  Vaccine (HIGH DOSE) 0.7 milliLiter(s) IntraMuscular once  isosorbide   mononitrate ER Tablet (IMDUR) 30 milliGRAM(s) Oral daily  lisinopril 20 milliGRAM(s) Oral daily    MEDICATIONS  (PRN):  heparin   Injectable 4400 Unit(s) IV Push every 6 hours PRN For aPTT less than 40  LORazepam   Injectable 0.25 milliGRAM(s) IV Push once PRN Anxiety      Allergies    No Known Allergies    Intolerances        ROS:   General:  No wt loss, fevers, chills, night sweats, fatigue,   Eyes:  Good vision, no reported pain  ENT:  No sore throat, pain, runny nose, dysphagia  CV:  No pain, palpitations, hypo/hypertension  Resp:  No dyspnea, cough, tachypnea, wheezing  GI:  No pain, No nausea, No vomiting, No diarrhea, No constipation, No weight loss, No fever, No pruritis, No rectal bleeding, No tarry stools, No dysphagia,  :  No pain, bleeding, incontinence, nocturia  Muscle:  No pain, weakness  Neuro:  No weakness, tingling, memory problems  Psych:  No fatigue, insomnia, mood problems, depression  Endocrine:  No polyuria, polydipsia, cold/heat intolerance  Heme:  No petechiae, ecchymosis, easy bruisability  Skin:  No rash, tattoos, scars, edema      PHYSICAL EXAM:   Vital Signs:  Vital Signs Last 24 Hrs  T(C): 36.8 (2023 08:25), Max: 37.4 (2023 12:33)  T(F): 98.2 (2023 08:25), Max: 99.3 (2023 12:33)  HR: 98 (2023 08:25) (85 - 98)  BP: 133/80 (2023 08:25) (125/72 - 155/88)  BP(mean): --  RR: 20 (2023 08:25) (16 - 20)  SpO2: 99% (2023 08:25) (97% - 99%)    Parameters below as of 2023 08:25  Patient On (Oxygen Delivery Method): room air      Daily     Daily     GENERAL:  Appears stated age,   HEENT:  NC/AT,    CHEST:  Full & symmetric excursion,   HEART:  Regular rhythm,  ABDOMEN:  Soft, non-tender, non-distended,  EXTEREMITIES:  no cyanosis  SKIN:  No rash  NEURO:  Alert,       LABS:                        9.3    8.18  )-----------( 509      ( 2023 09:35 )             30.0     01-    138  |  103  |  15  ----------------------------<  146<H>  3.6   |  26  |  0.88    Ca    9.3      2023 06:45    TPro  8.2  /  Alb  1.9<L>  /  TBili  0.3  /  DBili  x   /  AST  79<H>  /  ALT  111<H>  /  AlkPhos  132<H>  01-05    PT/INR - ( 2023 19:16 )   PT: 14.7 sec;   INR: 1.25 ratio         PTT - ( 2023 09:35 )  PTT:52.8 sec  Urinalysis Basic - ( 2023 18:18 )    Color: Yellow / Appearance: Clear / S.010 / pH: x  Gluc: x / Ketone: Negative  / Bili: Negative / Urobili: Negative   Blood: x / Protein: 100 / Nitrite: Negative   Leuk Esterase: Negative / RBC: 0-2 /HPF / WBC 3-5   Sq Epi: x / Non Sq Epi: Occasional / Bacteria: x        RADIOLOGY & ADDITIONAL TESTS:

## 2023-01-05 NOTE — CONSULT NOTE ADULT - ASSESSMENT
76 y/o Male w/ PMHx of CAD s/p CABG x 3 & stenting on ASA, HTN, HLD & Anemia who is being managed for ACS (elevated troponins and new BBB) and who was found to have microscopic hematuria, anemia and a large left renal lesion concerning for RCC with possible metastatic disease to the Left Lower Lobe and L4 vertebral body.       1.  78 y/o Male w/ PMHx of CAD s/p CABG x 3 & stenting on ASA, HTN, HLD & Anemia who is being managed for ACS (elevated troponins and new BBB) and who was found to have microscopic hematuria, anemia and a large left renal lesion concerning for RCC with possible metastatic disease to the Left Lower Lobe and L4 vertebral body.       1. Patient will need surgery to address the Left kidney lesion  2. However, will need COMPLETE cardiac workup prior in the setting of ACS (cardiac cath?)  3. Heme/Onc consult  4. Cardiology Follow Up  5. Discussed with Dr. Reynolds who agrees 76 y/o Male w/ PMHx of CAD s/p CABG x 3 & stenting on ASA, HTN, HLD & Anemia who is being managed for ACS (elevated troponins and new BBB) and who was found to have microscopic hematuria, anemia and a large left renal lesion concerning for RCC with possible metastatic disease to the Left Lower Lobe and L4 vertebral body.       1. Patient will need surgery to address the Left kidney lesion  2. However, will need COMPLETE cardiac workup prior in the setting of ACS (cardiac cath?)  3. Heme/Onc consult  4. urology Follow Up after discharge  5. Discussed with Dr. Reynolds who agrees

## 2023-01-05 NOTE — PROGRESS NOTE ADULT - PROBLEM SELECTOR PLAN 8
SCD given anemia  - also given possible renal cancer, will f/u heme-onc if need to r/o brain mets before starting AC given risk of bleeding On heparin gtt          #DISPO  PT consult  SW consult

## 2023-01-05 NOTE — CONSULT NOTE ADULT - ASSESSMENT
Left renal mass c/w renal cell carcinoma with metastatic disease to the lung and L4 vertebral body  Chest pain with ?NSTEMI  Anemia most likely secondary to acute/chronic disease with fe studies c/w diagnosis.  no obvious evidence of bleeding    Recommendations:  1.  follow CBC and transfuse as indicated  2.  continue cardiac workup  3.  Needs a biopsy of either lung or L4 but will need to arrange pending cardiology evaluation.  will need to hold asa/plavix for 5 days.  4.  Has large left renal mass with low burden metastatic disease and may potentially benefit from nephrectomy  5.  continue cardiac evaluation and treatment and potential clearance for nephrectomy  6.  further heme oncology    Left renal mass c/w renal cell carcinoma with metastatic disease to the lung and L4 vertebral body  Chest pain with ?NSTEMI  Anemia most likely secondary to acute/chronic disease with fe studies c/w diagnosis.  no obvious evidence of bleeding    Recommendations:  1.  follow CBC and transfuse as indicated  2.  continue cardiac workup  3.  Needs a biopsy of either lung or L4 but will need to arrange pending cardiology evaluation.  will need to hold asa/plavix for 5 days.  4.  Has large left renal mass with low burden metastatic disease and may potentially benefit from nephrectomy  5.  continue cardiac evaluation and treatment and potential clearance for nephrectomy  6.  further heme oncology recommendations pending above

## 2023-01-05 NOTE — ED ADULT NURSE REASSESSMENT NOTE - NS ED NURSE REASSESS COMMENT FT1
patient received at 0700. patient resting in bed. no complaints at this time. patient resting in bed. patient A&Ox4. IV intact. cardiac monitor in place. Hep drip infusing at 9 ml/hr. patient waiting for bed upstairs.

## 2023-01-05 NOTE — PROGRESS NOTE ADULT - ASSESSMENT
78 y/o f w/ PMH of CAD, HTN, HLD p/w chest pain, r/o ACS, found to have anemia, and imaging concerning for renal mass 76 y/o f w/ PMH of CAD, HTN, HLD p/w chest pain, now being managed for ACS, anemia workup, and imaging concerning for renal mass w/ metastasis. 76 y/o M w/ PMH of CAD, HTN, HLD p/w chest pain, now being managed for ACS, anemia workup, and imaging concerning for renal mass w/ metastasis.

## 2023-01-06 LAB
ALBUMIN SERPL ELPH-MCNC: 2 G/DL — LOW (ref 3.3–5)
ALP SERPL-CCNC: 140 U/L — HIGH (ref 40–120)
ALT FLD-CCNC: 108 U/L — HIGH (ref 12–78)
ANION GAP SERPL CALC-SCNC: 9 MMOL/L — SIGNIFICANT CHANGE UP (ref 5–17)
APTT BLD: 68.2 SEC — HIGH (ref 27.5–35.5)
AST SERPL-CCNC: 80 U/L — HIGH (ref 15–37)
BASOPHILS # BLD AUTO: 0.02 K/UL — SIGNIFICANT CHANGE UP (ref 0–0.2)
BASOPHILS NFR BLD AUTO: 0.2 % — SIGNIFICANT CHANGE UP (ref 0–2)
BILIRUB SERPL-MCNC: 0.3 MG/DL — SIGNIFICANT CHANGE UP (ref 0.2–1.2)
BUN SERPL-MCNC: 20 MG/DL — SIGNIFICANT CHANGE UP (ref 7–23)
CALCIUM SERPL-MCNC: 10.2 MG/DL — HIGH (ref 8.5–10.1)
CHLORIDE SERPL-SCNC: 104 MMOL/L — SIGNIFICANT CHANGE UP (ref 96–108)
CO2 SERPL-SCNC: 26 MMOL/L — SIGNIFICANT CHANGE UP (ref 22–31)
CREAT SERPL-MCNC: 1.1 MG/DL — SIGNIFICANT CHANGE UP (ref 0.5–1.3)
EGFR: 70 ML/MIN/1.73M2 — SIGNIFICANT CHANGE UP
EOSINOPHIL # BLD AUTO: 0.04 K/UL — SIGNIFICANT CHANGE UP (ref 0–0.5)
EOSINOPHIL NFR BLD AUTO: 0.5 % — SIGNIFICANT CHANGE UP (ref 0–6)
GLUCOSE SERPL-MCNC: 132 MG/DL — HIGH (ref 70–99)
HCT VFR BLD CALC: 29.4 % — LOW (ref 39–50)
HGB BLD-MCNC: 8.9 G/DL — LOW (ref 13–17)
IMM GRANULOCYTES NFR BLD AUTO: 0.4 % — SIGNIFICANT CHANGE UP (ref 0–0.9)
LYMPHOCYTES # BLD AUTO: 1.2 K/UL — SIGNIFICANT CHANGE UP (ref 1–3.3)
LYMPHOCYTES # BLD AUTO: 14.9 % — SIGNIFICANT CHANGE UP (ref 13–44)
MAGNESIUM SERPL-MCNC: 2.6 MG/DL — SIGNIFICANT CHANGE UP (ref 1.6–2.6)
MCHC RBC-ENTMCNC: 24.5 PG — LOW (ref 27–34)
MCHC RBC-ENTMCNC: 30.3 GM/DL — LOW (ref 32–36)
MCV RBC AUTO: 80.8 FL — SIGNIFICANT CHANGE UP (ref 80–100)
MONOCYTES # BLD AUTO: 0.52 K/UL — SIGNIFICANT CHANGE UP (ref 0–0.9)
MONOCYTES NFR BLD AUTO: 6.5 % — SIGNIFICANT CHANGE UP (ref 2–14)
NEUTROPHILS # BLD AUTO: 6.25 K/UL — SIGNIFICANT CHANGE UP (ref 1.8–7.4)
NEUTROPHILS NFR BLD AUTO: 77.5 % — HIGH (ref 43–77)
NRBC # BLD: 0 /100 WBCS — SIGNIFICANT CHANGE UP (ref 0–0)
PHOSPHATE SERPL-MCNC: 4.4 MG/DL — SIGNIFICANT CHANGE UP (ref 2.5–4.5)
PLATELET # BLD AUTO: 492 K/UL — HIGH (ref 150–400)
POTASSIUM SERPL-MCNC: 4.1 MMOL/L — SIGNIFICANT CHANGE UP (ref 3.5–5.3)
POTASSIUM SERPL-SCNC: 4.1 MMOL/L — SIGNIFICANT CHANGE UP (ref 3.5–5.3)
PROT SERPL-MCNC: 8.7 G/DL — HIGH (ref 6–8.3)
RBC # BLD: 3.64 M/UL — LOW (ref 4.2–5.8)
RBC # FLD: 17.4 % — HIGH (ref 10.3–14.5)
SODIUM SERPL-SCNC: 139 MMOL/L — SIGNIFICANT CHANGE UP (ref 135–145)
WBC # BLD: 8.06 K/UL — SIGNIFICANT CHANGE UP (ref 3.8–10.5)
WBC # FLD AUTO: 8.06 K/UL — SIGNIFICANT CHANGE UP (ref 3.8–10.5)

## 2023-01-06 PROCEDURE — 99233 SBSQ HOSP IP/OBS HIGH 50: CPT

## 2023-01-06 PROCEDURE — 99232 SBSQ HOSP IP/OBS MODERATE 35: CPT | Mod: GC

## 2023-01-06 RX ORDER — METOPROLOL TARTRATE 50 MG
200 TABLET ORAL DAILY
Refills: 0 | Status: DISCONTINUED | OUTPATIENT
Start: 2023-01-06 | End: 2023-01-10

## 2023-01-06 RX ADMIN — Medication 10 MILLIGRAM(S): at 00:04

## 2023-01-06 RX ADMIN — Medication 10 MILLIGRAM(S): at 22:04

## 2023-01-06 RX ADMIN — HEPARIN SODIUM 900 UNIT(S)/HR: 5000 INJECTION INTRAVENOUS; SUBCUTANEOUS at 07:08

## 2023-01-06 RX ADMIN — AMLODIPINE BESYLATE 10 MILLIGRAM(S): 2.5 TABLET ORAL at 06:29

## 2023-01-06 RX ADMIN — HEPARIN SODIUM 900 UNIT(S)/HR: 5000 INJECTION INTRAVENOUS; SUBCUTANEOUS at 19:05

## 2023-01-06 RX ADMIN — Medication 81 MILLIGRAM(S): at 11:49

## 2023-01-06 RX ADMIN — EZETIMIBE 10 MILLIGRAM(S): 10 TABLET ORAL at 11:50

## 2023-01-06 RX ADMIN — HEPARIN SODIUM 900 UNIT(S)/HR: 5000 INJECTION INTRAVENOUS; SUBCUTANEOUS at 03:30

## 2023-01-06 RX ADMIN — HEPARIN SODIUM 900 UNIT(S)/HR: 5000 INJECTION INTRAVENOUS; SUBCUTANEOUS at 02:17

## 2023-01-06 RX ADMIN — ISOSORBIDE MONONITRATE 30 MILLIGRAM(S): 60 TABLET, EXTENDED RELEASE ORAL at 11:49

## 2023-01-06 RX ADMIN — LISINOPRIL 20 MILLIGRAM(S): 2.5 TABLET ORAL at 06:29

## 2023-01-06 RX ADMIN — CLOPIDOGREL BISULFATE 75 MILLIGRAM(S): 75 TABLET, FILM COATED ORAL at 11:48

## 2023-01-06 NOTE — PROGRESS NOTE ADULT - SUBJECTIVE AND OBJECTIVE BOX
[facility]  Vivek Cole PA-C  [address] [place] [address]  [phone]      INTERVAL HPI/OVERNIGHT EVENTS:  Pt s/e  Reports no overt GI bleeding    MEDICATIONS  (STANDING):  amLODIPine   Tablet 10 milliGRAM(s) Oral daily  aspirin  chewable 81 milliGRAM(s) Oral daily  clopidogrel Tablet 75 milliGRAM(s) Oral daily  ezetimibe 10 milliGRAM(s) Oral daily  heparin  Infusion.  Unit(s)/Hr (9 mL/Hr) IV Continuous <Continuous>  influenza  Vaccine (HIGH DOSE) 0.7 milliLiter(s) IntraMuscular once  isosorbide   mononitrate ER Tablet (IMDUR) 30 milliGRAM(s) Oral daily  lisinopril 20 milliGRAM(s) Oral daily  predniSONE   Tablet 10 milliGRAM(s) Oral at bedtime    MEDICATIONS  (PRN):  heparin   Injectable 4400 Unit(s) IV Push every 6 hours PRN For aPTT less than 40  LORazepam   Injectable 0.25 milliGRAM(s) IV Push once PRN Anxiety      Allergies    No Known Allergies      PHYSICAL EXAM:   Vital Signs:  Vital Signs Last 24 Hrs  T(C): 36.8 (2023 04:47), Max: 37.3 (2023 23:46)  T(F): 98.3 (2023 04:47), Max: 99.1 (2023 23:46)  HR: 85 (2023 11:50) (85 - 99)  BP: 132/76 (2023 11:50) (108/64 - 134/76)  BP(mean): 77 (2023 15:20) (77 - 77)  RR: 18 (2023 04:47) (18 - 18)  SpO2: 96% (2023 04:47) (96% - 97%)    Parameters below as of 2023 04:47  Patient On (Oxygen Delivery Method): room air      Daily     Daily Weight in k.6 (2023 04:47)    GENERAL:  Appears stated age  ABDOMEN:  Soft, non-tender, non-distended  NEURO:  Alert      LABS:                        8.9    8.06  )-----------( 492      ( 2023 08:46 )             29.4     -    139  |  104  |  20  ----------------------------<  132<H>  4.1   |  26  |  1.10    Ca    10.2<H>      2023 08:46  Phos  4.4       Mg     2.6         TPro  8.7<H>  /  Alb  2.0<L>  /  TBili  0.3  /  DBili  x   /  AST  80<H>  /  ALT  108<H>  /  AlkPhos  140<H>      PT/INR - ( 2023 19:16 )   PT: 14.7 sec;   INR: 1.25 ratio         PTT - ( 2023 01:45 )  PTT:68.2 sec  Urinalysis Basic - ( 2023 18:18 )    Color: Yellow / Appearance: Clear / S.010 / pH: x  Gluc: x / Ketone: Negative  / Bili: Negative / Urobili: Negative   Blood: x / Protein: 100 / Nitrite: Negative   Leuk Esterase: Negative / RBC: 0-2 /HPF / WBC 3-5   Sq Epi: x / Non Sq Epi: Occasional / Bacteria: x

## 2023-01-06 NOTE — PROGRESS NOTE ADULT - ASSESSMENT
78 y/o M w/ PMH of CAD, HTN, HLD p/w chest pain, now being managed for ACS, anemia workup, and imaging concerning for renal mass w/ metastasis.

## 2023-01-06 NOTE — PROGRESS NOTE ADULT - ASSESSMENT
77 year old male with history of CABG in 1995, S/P stent x1 placement in 2008, HTN, hyperlipidemia, presented to the ED with the c/o sudden onset of left-sided chest pain admitted for new LBBB and rising trops     IN progress    Elevated Trops, HTN   -p/w chest pain, +elevated hTrops, peaked and downtrending.  - Patient is not complaining of any cardiac symptoms at this time  - CPK, CKMB, flat , likely demand in the setting of anemia   - EKG shows new LBBB when compared to last EKG down as out patient on 12/24/22,  repeat EKG, NSR   - CT C/A/P - Left ventricular aneurysm,Lrenal neoplasm, pending official read   - f/u 2d echo  - would continue hep gtt x 48 hours   - not optimal candidate for LHC would opt for medical management  at this time  - continue to monitor for anginal complaints   - continue ASA, statin  - BP well controlled, monitor routine hemodynamics 119/74   - continue CCB, BB, ARB with hold parameters     - anemia w/u ongoing   - GI and Hematology/oncology following     - Monitor and replete Lytes. Keep K > 4 and Mg > 2  - Will continue to follow.    Ingrid Shepard FNP-C  Cardiology NP  [facility] [phone]     77 year old male with history of CABG in 1995, S/P stent x1 placement in 2008, HTN, hyperlipidemia, presented to the ED with the c/o sudden onset of left-sided chest pain admitted for new LBBB and rising trops         Elevated Trops, HTN   -p/w chest pain, +elevated hTrops, peaked and downtrending.  - Patient is not complaining of any cardiac symptoms at this time  - CPK, CKMB, flat , likely demand in the setting of anemia   - EKG shows new LBBB when compared to last EKG down as out patient on 12/24/22,  repeat EKG, NSR   - CT C/A/P - Left ventricular aneurysm,Lrenal neoplasm, pending official read   - f/u 2d echo  - would continue hep gtt x 48 hours   - not optimal candidate for LHC would opt for medical management  at this time given hem onc w/u  - continue to monitor for anginal complaints   - continue ASA, statin  - BP well controlled, monitor routine hemodynamics 119/74   - continue CCB, BB, ARB with hold parameters     - anemia w/u ongoing   - GI and Hematology/oncology following     - Monitor and replete Lytes. Keep K > 4 and Mg > 2  - Will continue to follow.    Ingrid Shepard FNP-C  Cardiology NP  [facility] [phone]

## 2023-01-06 NOTE — PROGRESS NOTE ADULT - PROBLEM SELECTOR PLAN 3
Baseline of 14  Recent hx of anemia being worked up outpt, pt reported UA as outpatient showed proteuria and hematuria - pt had GI and urology appts scheduled for next week  Today Hgb 8.9 (yesterday 9.3); HB on admission 8.6; Baseline of ~14 in july of 2020 with progressive drop  - Maybe related to renal mass, possible anemia of chronic disease related to an underlying renal malignancy  Heme/onc consulted recs appreciated  GI consulted recs appreciated

## 2023-01-06 NOTE — PROGRESS NOTE ADULT - PROBLEM SELECTOR PLAN 8
On heparin gtt      Electrolytes  Ca borderline - monitor - likely hypercalcemia of malignancy      #DISPO  PT consult  SW consult  Depending on timing of ability to d/c asa and Plavix for bx will guide early dc timeline

## 2023-01-06 NOTE — PROGRESS NOTE ADULT - PROBLEM SELECTOR PLAN 1
Chest pain - etiology likely ACS with new LBBB on EKG though mixed picture with slow progressive anemia causing demand  Trops ~ 80 trended to peak 2005.2   S/p asa 324 and nitroglycerin x1 by EMS  ACS treatment initiated    - heparin gtt x 48 hours, asa, plavix, imdur, bb    - hold home atorvastatin - transaminitis  CT C/A/P - Left ventricular aneurysm will obtain 2d echo - f/u official read  Cardiology following

## 2023-01-06 NOTE — PROGRESS NOTE ADULT - SUBJECTIVE AND OBJECTIVE BOX
Interval History:  continues on IV heparin  Chart reviewed and events noted;   Overnight events:    MEDICATIONS  (STANDING):  amLODIPine   Tablet 10 milliGRAM(s) Oral daily  aspirin  chewable 81 milliGRAM(s) Oral daily  clopidogrel Tablet 75 milliGRAM(s) Oral daily  ezetimibe 10 milliGRAM(s) Oral daily  heparin  Infusion.  Unit(s)/Hr (9 mL/Hr) IV Continuous <Continuous>  influenza  Vaccine (HIGH DOSE) 0.7 milliLiter(s) IntraMuscular once  isosorbide   mononitrate ER Tablet (IMDUR) 30 milliGRAM(s) Oral daily  lisinopril 20 milliGRAM(s) Oral daily  metoprolol succinate  milliGRAM(s) Oral daily  predniSONE   Tablet 10 milliGRAM(s) Oral at bedtime    MEDICATIONS  (PRN):  heparin   Injectable 4400 Unit(s) IV Push every 6 hours PRN For aPTT less than 40  LORazepam   Injectable 0.25 milliGRAM(s) IV Push once PRN Anxiety      Vital Signs Last 24 Hrs  T(C): 36.1 (06 Jan 2023 19:27), Max: 37.3 (05 Jan 2023 23:46)  T(F): 97 (06 Jan 2023 19:27), Max: 99.1 (05 Jan 2023 23:46)  HR: 95 (06 Jan 2023 19:27) (85 - 96)  BP: 113/66 (06 Jan 2023 19:27) (104/69 - 134/76)  BP(mean): --  RR: 18 (06 Jan 2023 19:27) (18 - 18)  SpO2: 93% (06 Jan 2023 19:27) (93% - 96%)    Parameters below as of 06 Jan 2023 19:27  Patient On (Oxygen Delivery Method): room air        PHYSICAL EXAM  General: adult in NAD  HEENT: clear oropharynx, anicteric sclera, pink conjunctivae  Neck: supple  CV: normal S1S2 with no murmur rubs or gallops  Lungs: clear to auscultation, no wheezes, no rhales  Abdomen: soft non-tender non-distended, no hepato/splenomegaly  Ext: no clubbing cyanosis or edema  Skin: no rashes and no petichiae  Neuro: alert and oriented X3 no focal deficits      LABS:  CBC Full  -  ( 06 Jan 2023 08:46 )  WBC Count : 8.06 K/uL  RBC Count : 3.64 M/uL  Hemoglobin : 8.9 g/dL  Hematocrit : 29.4 %  Platelet Count - Automated : 492 K/uL  Mean Cell Volume : 80.8 fl  Mean Cell Hemoglobin : 24.5 pg  Mean Cell Hemoglobin Concentration : 30.3 gm/dL  Auto Neutrophil # : 6.25 K/uL  Auto Lymphocyte # : 1.20 K/uL  Auto Monocyte # : 0.52 K/uL  Auto Eosinophil # : 0.04 K/uL  Auto Basophil # : 0.02 K/uL  Auto Neutrophil % : 77.5 %  Auto Lymphocyte % : 14.9 %  Auto Monocyte % : 6.5 %  Auto Eosinophil % : 0.5 %  Auto Basophil % : 0.2 %    01-06    139  |  104  |  20  ----------------------------<  132<H>  4.1   |  26  |  1.10    Ca    10.2<H>      06 Jan 2023 08:46  Phos  4.4     01-06  Mg     2.6     01-06    TPro  8.7<H>  /  Alb  2.0<L>  /  TBili  0.3  /  DBili  x   /  AST  80<H>  /  ALT  108<H>  /  AlkPhos  140<H>  01-06    PTT - ( 06 Jan 2023 01:45 )  PTT:68.2 sec    fe studies  Iron - Total Binding Capacity.: 251 ug/dL (01-05 @ 09:35)  Iron - Total Binding Capacity.: 264 ug/dL (01-05 @ 06:45)  Ferritin, Serum: 860 ng/mL (01-05 @ 06:45)  Ferritin, Serum: 889 ng/mL (01-04 @ 19:16)      WBC trend  8.06 K/uL (01-06-23 @ 08:46)  8.18 K/uL (01-05-23 @ 09:35)  8.18 K/uL (01-05-23 @ 06:45)  7.96 K/uL (01-04-23 @ 02:58)      Hgb trend  8.9 g/dL (01-06-23 @ 08:46)  9.3 g/dL (01-05-23 @ 09:35)  8.7 g/dL (01-05-23 @ 06:45)  8.6 g/dL (01-04-23 @ 02:58)      plt trend  492 K/uL (01-06-23 @ 08:46)  509 K/uL (01-05-23 @ 09:35)  437 K/uL (01-05-23 @ 06:45)  425 K/uL (01-04-23 @ 02:58)        RADIOLOGY & ADDITIONAL STUDIES:

## 2023-01-06 NOTE — PROGRESS NOTE ADULT - ASSESSMENT
Left renal mass c/w renal cell carcinoma with metastatic disease to the lung and L4 vertebral body  Chest pain with ?NSTEMI  Anemia most likely secondary to acute/chronic disease with fe studies c/w diagnosis.  no obvious evidence of bleeding    Recommendations:  1.  follow CBC and transfuse as indicated  2.  continue cardiac workup  3.  Needs a biopsy of either lung or L4 but will need to arrange pending cardiology evaluation.  will need to hold asa/plavix for 5 days.  4.  Has large left renal mass with low burden metastatic disease and may potentially benefit from nephrectomy  5.  continue cardiac evaluation and treatment and potential clearance for nephrectomy  discussed with Dr. Schultz.  does not want to stop asa and plavix for biopsy.  willing to stop only plavix.  waiting to here back from interventional radiology   will not have biopsy done during this hospitalization and will be arranging as outpatient when stable  6.  further heme oncology recommendations pending above  discussed with patient and Dr. Schultz and Pretty

## 2023-01-06 NOTE — PROGRESS NOTE ADULT - SUBJECTIVE AND OBJECTIVE BOX
Patient is a 77y old  Male who presents with a chief complaint of chest pain, r/o ACS (2023 12:39)      Subjective:  INTERVAL HPI/OVERNIGHT EVENTS: Patient seen and examined at bedside. No overnight events occurred. Endorses persistent night sweats. Denies fevers, chills, headache, lightheadedness, chest pain, dyspnea, abdominal pain, n/v/d/c.    MEDICATIONS  (STANDING):  amLODIPine   Tablet 10 milliGRAM(s) Oral daily  aspirin  chewable 81 milliGRAM(s) Oral daily  clopidogrel Tablet 75 milliGRAM(s) Oral daily  ezetimibe 10 milliGRAM(s) Oral daily  heparin  Infusion.  Unit(s)/Hr (9 mL/Hr) IV Continuous <Continuous>  influenza  Vaccine (HIGH DOSE) 0.7 milliLiter(s) IntraMuscular once  isosorbide   mononitrate ER Tablet (IMDUR) 30 milliGRAM(s) Oral daily  lisinopril 20 milliGRAM(s) Oral daily  predniSONE   Tablet 10 milliGRAM(s) Oral at bedtime    MEDICATIONS  (PRN):  heparin   Injectable 4400 Unit(s) IV Push every 6 hours PRN For aPTT less than 40  LORazepam   Injectable 0.25 milliGRAM(s) IV Push once PRN Anxiety      Allergies    No Known Allergies    Intolerances        REVIEW OF SYSTEMS:  CONSTITUTIONAL: + Night sweats. No fever or chills  HEENT:  No headache, no sore throat  RESPIRATORY: No cough, wheezing, or shortness of breath  CARDIOVASCULAR: No chest pain, palpitations  GASTROINTESTINAL: No abd pain, nausea, vomiting, or diarrhea  GENITOURINARY: No dysuria, frequency, gross hematuria  NEUROLOGICAL: no focal weakness or dizziness  MUSCULOSKELETAL: no myalgias     Objective:  Vital Signs Last 24 Hrs  T(C): 36.8 (2023 04:47), Max: 37.3 (2023 23:46)  T(F): 98.3 (2023 04:47), Max: 99.1 (2023 23:46)  HR: 85 (2023 11:50) (85 - 99)  BP: 132/76 (2023 11:50) (108/64 - 134/76)  BP(mean): 77 (2023 15:20) (77 - 77)  RR: 18 (2023 04:47) (18 - 18)  SpO2: 96% (2023 04:47) (96% - 97%)    Parameters below as of 2023 04:47  Patient On (Oxygen Delivery Method): room air        PE:  GENERAL: NAD, lying in bed comfortably  HEAD:  Atraumatic, Normocephalic  EYES: EOMI, conjunctiva and sclera clear  ENT: Moist mucous membranes  NECK: Supple, No JVD  CHEST/LUNG: Clear to auscultation bilaterally; No rales, rhonchi, wheezing, or rubs. Unlabored respirations  HEART: Regular rate and rhythm; No murmurs, rubs, or gallops  ABDOMEN: Soft, Nontender, Nondistended  EXTREMITIES:  No clubbing, cyanosis, or edema  NERVOUS SYSTEM:  Alert & Oriented X3, speech clear. No gross deficits appreciated.    LABS:                        8.9    8.06  )-----------( 492      ( 2023 08:46 )             29.4     CBC Full  -  ( 2023 08:46 )  WBC Count : 8.06 K/uL  Hemoglobin : 8.9 g/dL  Hematocrit : 29.4 %  Platelet Count - Automated : 492 K/uL  Mean Cell Volume : 80.8 fl  Mean Cell Hemoglobin : 24.5 pg  Mean Cell Hemoglobin Concentration : 30.3 gm/dL  Auto Neutrophil # : 6.25 K/uL  Auto Lymphocyte # : 1.20 K/uL  Auto Monocyte # : 0.52 K/uL  Auto Eosinophil # : 0.04 K/uL  Auto Basophil # : 0.02 K/uL  Auto Neutrophil % : 77.5 %  Auto Lymphocyte % : 14.9 %  Auto Monocyte % : 6.5 %  Auto Eosinophil % : 0.5 %  Auto Basophil % : 0.2 %    2023 08:46    139    |  104    |  20     ----------------------------<  132    4.1     |  26     |  1.10     Ca    10.2       2023 08:46  Phos  4.4       2023 08:46  Mg     2.6       2023 08:46    TPro  8.7    /  Alb  2.0    /  TBili  0.3    /  DBili  x      /  AST  80     /  ALT  108    /  AlkPhos  140    2023 08:46    PT/INR - ( 2023 19:16 )   PT: 14.7 sec;   INR: 1.25 ratio         PTT - ( 2023 01:45 )  PTT:68.2 sec  Urinalysis Basic - ( 2023 18:18 )    Color: Yellow / Appearance: Clear / S.010 / pH: x  Gluc: x / Ketone: Negative  / Bili: Negative / Urobili: Negative   Blood: x / Protein: 100 / Nitrite: Negative   Leuk Esterase: Negative / RBC: 0-2 /HPF / WBC 3-5   Sq Epi: x / Non Sq Epi: Occasional / Bacteria: x      CAPILLARY BLOOD GLUCOSE              RADIOLOGY & ADDITIONAL TESTS:    Personally reviewed.     Consultant(s) Notes Reviewed:  [x] YES  [ ] NO

## 2023-01-06 NOTE — PROGRESS NOTE ADULT - PROBLEM SELECTOR PLAN 2
CT C/A/P    - L renal neoplasm [id number] cm    - LLL nodule concern for mets    - L4 lucent lesion c/f bone mets 1.5 x 1.4 cm  Rads rec bone scan vs MRI for further evaluation  Heme/onc consulted    - Will need bx -> has to be off ASA and plavix x 5 days prior to bx - to d/w cardio given ACS management    - Poss nephrectomy when stable  Uro consulted    - F/u op upon d/c

## 2023-01-06 NOTE — PROGRESS NOTE ADULT - SUBJECTIVE AND OBJECTIVE BOX
[facility] -- Luis Cha Pannella, Patel, Savella [facility]  Office # [phone]      Follow Up:    Chest pain   Subjective/Observations:   No events overnight resting comfortably in bed.  No complaints of chest pain, dyspnea, or palpitations reported. No signs of orthopnea or PND.      REVIEW OF SYSTEMS: All other review of systems is negative unless indicated above    PAST MEDICAL & SURGICAL HISTORY:  HTN - Hypertension      H/O hyperlipidemia      CAD, multiple vessel      Renal colic      Hypertension      S/P CABG x 3        S/P coronary artery stent placement  2 stents - 3 years ago      S/P knee surgery  arthroscopic b/l      S/P hernia repair      S/P CABG x 4      S/P coronary artery stent placement          MEDICATIONS  (STANDING):  amLODIPine   Tablet 10 milliGRAM(s) Oral daily  aspirin  chewable 81 milliGRAM(s) Oral daily  clopidogrel Tablet 75 milliGRAM(s) Oral daily  ezetimibe 10 milliGRAM(s) Oral daily  heparin  Infusion.  Unit(s)/Hr (9 mL/Hr) IV Continuous <Continuous>  influenza  Vaccine (HIGH DOSE) 0.7 milliLiter(s) IntraMuscular once  isosorbide   mononitrate ER Tablet (IMDUR) 30 milliGRAM(s) Oral daily  lisinopril 20 milliGRAM(s) Oral daily  predniSONE   Tablet 10 milliGRAM(s) Oral at bedtime    MEDICATIONS  (PRN):  heparin   Injectable 4400 Unit(s) IV Push every 6 hours PRN For aPTT less than 40  LORazepam   Injectable 0.25 milliGRAM(s) IV Push once PRN Anxiety      Allergies    No Known Allergies    Intolerances        Vital Signs Last 24 Hrs  T(C): 36.8 (2023 04:47), Max: 37.3 (2023 23:46)  T(F): 98.3 (2023 04:47), Max: 99.1 (2023 23:46)  HR: 89 (2023 04:47) (87 - 99)  BP: 119/74 (2023 04:47) (108/64 - 134/76)  BP(mean): 77 (2023 15:20) (77 - 77)  RR: 18 (2023 04:47) (18 - 20)  SpO2: 96% (2023 04:47) (96% - 99%)    Parameters below as of 2023 04:47  Patient On (Oxygen Delivery Method): room air        I&O's Summary        PHYSICAL EXAM:  TELE: SR  Constitutional: NAD, awake and alert, well-developed  HEENT: Moist Mucous Membranes, Anicteric  Pulmonary: Non-labored, breath sounds are clear bilaterally, No wheezing, crackles or rhonchi  Cardiovascular: Regular, S1 and S2 nl, No murmurs, rubs, gallops or clicks  Gastrointestinal: Bowel Sounds present, soft, nontender.   Lymph: No lymphadenopathy. No peripheral edema.  Skin: No visible rashes or ulcers.  Psych:  Mood & affect appropriate    LABS: All Labs Reviewed:                        9.3    8.18  )-----------( 509      ( 2023 09:35 )             30.0                         8.7    8.18  )-----------( 437      ( 2023 06:45 )             28.4                         8.6    7.96  )-----------( 425      ( 2023 02:58 )             28.3     2023 06:45    138    |  103    |  15     ----------------------------<  146    3.6     |  26     |  0.88   2023 02:58    138    |  105    |  23     ----------------------------<  114    3.9     |  25     |  1.00     Ca    9.3        2023 06:45  Ca    9.1        2023 02:58    TPro  8.2    /  Alb  1.9    /  TBili  0.3    /  DBili  x      /  AST  79     /  ALT  111    /  AlkPhos  132    2023 06:45  TPro  8.4    /  Alb  2.0    /  TBili  0.2    /  DBili  x      /  AST  115    /  ALT  137    /  AlkPhos  147    2023 02:58    PT/INR - ( 2023 19:16 )   PT: 14.7 sec;   INR: 1.25 ratio         PTT - ( 2023 01:45 )  PTT:68.2 sec  CARDIAC MARKERS ( 2023 06:45 )  x     / x     / 49 U/L / x     / 1.8 ng/mL  CARDIAC MARKERS ( 2023 11:38 )  x     / x     / x     / x     / 5.7 ng/mL         EC Lead ECG:   Ventricular Rate 90 BPM    Atrial Rate 90 BPM    P-R Interval 172 ms    QRS Duration 104 ms    Q-T Interval 384 ms    QTC Calculation(Bazett) 469 ms    P Axis 58 degrees    R Axis 36 degrees    T Axis 54 degrees    Diagnosis Line Normal sinus rhythm  Possible Left atrial enlargement  Minimal voltage criteria for LVH, may be normal variant ( Kelby product )  Inferior infarct (cited on or before 2023)  Possible Anterior infarct (cited on or before 2023)  Confirmed by HEIDY MAI ([id number]) on 2023 5:41:29 PM (23 @ 13:54)        Radiology:         [facility] -- Luis Cha Pannella, Patel, Savella [facility]  Office # [phone]      Follow Up:    Chest pain   Subjective/Observations:   No events overnight resting comfortably in bed.  No complaints of chest pain, dyspnea, or palpitations reported. No signs of orthopnea or PND.  remains on room air, iv heparin     REVIEW OF SYSTEMS: All other review of systems is negative unless indicated above    PAST MEDICAL & SURGICAL HISTORY:  HTN - Hypertension      H/O hyperlipidemia      CAD, multiple vessel      Renal colic      Hypertension      S/P CABG x 3        S/P coronary artery stent placement  2 stents - 3 years ago      S/P knee surgery  arthroscopic b/l      S/P hernia repair      S/P CABG x 4      S/P coronary artery stent placement          MEDICATIONS  (STANDING):  amLODIPine   Tablet 10 milliGRAM(s) Oral daily  aspirin  chewable 81 milliGRAM(s) Oral daily  clopidogrel Tablet 75 milliGRAM(s) Oral daily  ezetimibe 10 milliGRAM(s) Oral daily  heparin  Infusion.  Unit(s)/Hr (9 mL/Hr) IV Continuous <Continuous>  influenza  Vaccine (HIGH DOSE) 0.7 milliLiter(s) IntraMuscular once  isosorbide   mononitrate ER Tablet (IMDUR) 30 milliGRAM(s) Oral daily  lisinopril 20 milliGRAM(s) Oral daily  predniSONE   Tablet 10 milliGRAM(s) Oral at bedtime    MEDICATIONS  (PRN):  heparin   Injectable 4400 Unit(s) IV Push every 6 hours PRN For aPTT less than 40  LORazepam   Injectable 0.25 milliGRAM(s) IV Push once PRN Anxiety      Allergies    No Known Allergies    Intolerances        Vital Signs Last 24 Hrs  T(C): 36.8 (2023 04:47), Max: 37.3 (2023 23:46)  T(F): 98.3 (2023 04:47), Max: 99.1 (2023 23:46)  HR: 89 (2023 04:47) (87 - 99)  BP: 119/74 (2023 04:47) (108/64 - 134/76)  BP(mean): 77 (2023 15:20) (77 - 77)  RR: 18 (2023 04:47) (18 - 20)  SpO2: 96% (2023 04:47) (96% - 99%)    Parameters below as of 2023 04:47  Patient On (Oxygen Delivery Method): room air        I&O's Summary        PHYSICAL EXAM:  TELE: SR  Constitutional: NAD, awake and alert, well-developed  HEENT: Moist Mucous Membranes, Anicteric  Pulmonary: Non-labored, breath sounds are clear bilaterally, No wheezing, crackles or rhonchi  Cardiovascular: Regular, S1 and S2 nl, No murmurs, rubs, gallops or clicks  Gastrointestinal: Bowel Sounds present, soft, nontender.   Lymph: No lymphadenopathy. No peripheral edema.  Skin: No visible rashes or ulcers.  Psych:  Mood & affect appropriate    LABS: All Labs Reviewed:                        9.3    8.18  )-----------( 509      ( 2023 09:35 )             30.0                         8.7    8.18  )-----------( 437      ( 2023 06:45 )             28.4                         8.6    7.96  )-----------( 425      ( 2023 02:58 )             28.3     2023 06:45    138    |  103    |  15     ----------------------------<  146    3.6     |  26     |  0.88   2023 02:58    138    |  105    |  23     ----------------------------<  114    3.9     |  25     |  1.00     Ca    9.3        2023 06:45  Ca    9.1        2023 02:58    TPro  8.2    /  Alb  1.9    /  TBili  0.3    /  DBili  x      /  AST  79     /  ALT  111    /  AlkPhos  132    2023 06:45  TPro  8.4    /  Alb  2.0    /  TBili  0.2    /  DBili  x      /  AST  115    /  ALT  137    /  AlkPhos  147    2023 02:58    PT/INR - ( 2023 19:16 )   PT: 14.7 sec;   INR: 1.25 ratio         PTT - ( 2023 01:45 )  PTT:68.2 sec  CARDIAC MARKERS ( 2023 06:45 )  x     / x     / 49 U/L / x     / 1.8 ng/mL  CARDIAC MARKERS ( 2023 11:38 )  x     / x     / x     / x     / 5.7 ng/mL         EC Lead ECG:   Ventricular Rate 90 BPM    Atrial Rate 90 BPM    P-R Interval 172 ms    QRS Duration 104 ms    Q-T Interval 384 ms    QTC Calculation(Bazett) 469 ms    P Axis 58 degrees    R Axis 36 degrees    T Axis 54 degrees    Diagnosis Line Normal sinus rhythm  Possible Left atrial enlargement  Minimal voltage criteria for LVH, may be normal variant ( Kelby product )  Inferior infarct (cited on or before 2023)  Possible Anterior infarct (cited on or before 2023)  Confirmed by HEIDY MAI ([id number]) on 2023 5:41:29 PM (23 @ 13:54)

## 2023-01-07 LAB
ALBUMIN SERPL ELPH-MCNC: 1.9 G/DL — LOW (ref 3.3–5)
ALP SERPL-CCNC: 126 U/L — HIGH (ref 40–120)
ALT FLD-CCNC: 120 U/L — HIGH (ref 12–78)
ANION GAP SERPL CALC-SCNC: 9 MMOL/L — SIGNIFICANT CHANGE UP (ref 5–17)
APTT BLD: 48.8 SEC — HIGH (ref 27.5–35.5)
AST SERPL-CCNC: 91 U/L — HIGH (ref 15–37)
BILIRUB SERPL-MCNC: 0.3 MG/DL — SIGNIFICANT CHANGE UP (ref 0.2–1.2)
BUN SERPL-MCNC: 20 MG/DL — SIGNIFICANT CHANGE UP (ref 7–23)
CALCIUM SERPL-MCNC: 9.5 MG/DL — SIGNIFICANT CHANGE UP (ref 8.5–10.1)
CHLORIDE SERPL-SCNC: 104 MMOL/L — SIGNIFICANT CHANGE UP (ref 96–108)
CK MB BLD-MCNC: <2.3 % — SIGNIFICANT CHANGE UP (ref 0–3.5)
CK MB CFR SERPL CALC: <1 NG/ML — SIGNIFICANT CHANGE UP (ref 0–3.6)
CK SERPL-CCNC: 44 U/L — SIGNIFICANT CHANGE UP (ref 26–308)
CO2 SERPL-SCNC: 26 MMOL/L — SIGNIFICANT CHANGE UP (ref 22–31)
CREAT SERPL-MCNC: 1.1 MG/DL — SIGNIFICANT CHANGE UP (ref 0.5–1.3)
EGFR: 70 ML/MIN/1.73M2 — SIGNIFICANT CHANGE UP
GLUCOSE SERPL-MCNC: 164 MG/DL — HIGH (ref 70–99)
HCT VFR BLD CALC: 26 % — LOW (ref 39–50)
HCT VFR BLD CALC: 26.6 % — LOW (ref 39–50)
HGB BLD-MCNC: 8.1 G/DL — LOW (ref 13–17)
HGB BLD-MCNC: 8.1 G/DL — LOW (ref 13–17)
MAGNESIUM SERPL-MCNC: 2.4 MG/DL — SIGNIFICANT CHANGE UP (ref 1.6–2.6)
MAGNESIUM SERPL-MCNC: 2.4 MG/DL — SIGNIFICANT CHANGE UP (ref 1.6–2.6)
MCHC RBC-ENTMCNC: 24.9 PG — LOW (ref 27–34)
MCHC RBC-ENTMCNC: 31.2 GM/DL — LOW (ref 32–36)
MCV RBC AUTO: 80 FL — SIGNIFICANT CHANGE UP (ref 80–100)
NRBC # BLD: 0 /100 WBCS — SIGNIFICANT CHANGE UP (ref 0–0)
PHOSPHATE SERPL-MCNC: 3.8 MG/DL — SIGNIFICANT CHANGE UP (ref 2.5–4.5)
PHOSPHATE SERPL-MCNC: 3.9 MG/DL — SIGNIFICANT CHANGE UP (ref 2.5–4.5)
PLATELET # BLD AUTO: 447 K/UL — HIGH (ref 150–400)
POTASSIUM SERPL-MCNC: 4.1 MMOL/L — SIGNIFICANT CHANGE UP (ref 3.5–5.3)
POTASSIUM SERPL-SCNC: 4.1 MMOL/L — SIGNIFICANT CHANGE UP (ref 3.5–5.3)
PROT SERPL-MCNC: 8.2 G/DL — SIGNIFICANT CHANGE UP (ref 6–8.3)
RBC # BLD: 3.25 M/UL — LOW (ref 4.2–5.8)
RBC # FLD: 17.4 % — HIGH (ref 10.3–14.5)
SODIUM SERPL-SCNC: 139 MMOL/L — SIGNIFICANT CHANGE UP (ref 135–145)
TROPONIN I, HIGH SENSITIVITY RESULT: 275.5 NG/L — HIGH
WBC # BLD: 8.41 K/UL — SIGNIFICANT CHANGE UP (ref 3.8–10.5)
WBC # FLD AUTO: 8.41 K/UL — SIGNIFICANT CHANGE UP (ref 3.8–10.5)

## 2023-01-07 PROCEDURE — 99233 SBSQ HOSP IP/OBS HIGH 50: CPT | Mod: GC

## 2023-01-07 PROCEDURE — 93010 ELECTROCARDIOGRAM REPORT: CPT

## 2023-01-07 PROCEDURE — 99233 SBSQ HOSP IP/OBS HIGH 50: CPT

## 2023-01-07 RX ORDER — MAGNESIUM SULFATE 500 MG/ML
1 VIAL (ML) INJECTION ONCE
Refills: 0 | Status: COMPLETED | OUTPATIENT
Start: 2023-01-07 | End: 2023-01-07

## 2023-01-07 RX ORDER — METOPROLOL TARTRATE 50 MG
12.5 TABLET ORAL ONCE
Refills: 0 | Status: COMPLETED | OUTPATIENT
Start: 2023-01-07 | End: 2023-01-07

## 2023-01-07 RX ADMIN — Medication 81 MILLIGRAM(S): at 11:10

## 2023-01-07 RX ADMIN — ISOSORBIDE MONONITRATE 30 MILLIGRAM(S): 60 TABLET, EXTENDED RELEASE ORAL at 11:10

## 2023-01-07 RX ADMIN — Medication 100 GRAM(S): at 04:25

## 2023-01-07 RX ADMIN — Medication 12.5 MILLIGRAM(S): at 04:25

## 2023-01-07 RX ADMIN — LISINOPRIL 20 MILLIGRAM(S): 2.5 TABLET ORAL at 06:20

## 2023-01-07 RX ADMIN — AMLODIPINE BESYLATE 10 MILLIGRAM(S): 2.5 TABLET ORAL at 06:18

## 2023-01-07 RX ADMIN — Medication 200 MILLIGRAM(S): at 06:18

## 2023-01-07 RX ADMIN — CLOPIDOGREL BISULFATE 75 MILLIGRAM(S): 75 TABLET, FILM COATED ORAL at 11:08

## 2023-01-07 RX ADMIN — Medication 40 MILLIGRAM(S): at 22:22

## 2023-01-07 RX ADMIN — EZETIMIBE 10 MILLIGRAM(S): 10 TABLET ORAL at 11:11

## 2023-01-07 NOTE — PROGRESS NOTE ADULT - ASSESSMENT
77 year old male with history of CABG in 1995, S/P stent x1 placement in 2008, HTN, hyperlipidemia, presented to the ED with the c/o sudden onset of left-sided chest pain admitted for new LBBB and rising trops     Elevated Trops, HTN   -p/w chest pain, +elevated hTrops, peaked and downtrending.  - Patient is not complaining of any cardiac symptoms at this time  - CPK, CKMB, flat , likely demand in the setting of anemia   - EKG shows new LBBB when compared to last EKG down as out patient on 12/24/22,  repeat EKG, NSR   - CT C/A/P - Left ventricular aneurysm, Lrenal neoplasm, pending official read , followed by hem onc   - 2decho as above normal lv 55-60% hypokinesis of mid to basal inferior wall mild mr, trace tr   - sp hep gtt x 48 hours   - not optimal candidate for LHC would opt for medical management  at this time given hem onc w/u  - continue to monitor for anginal complaints   - continue ASA, statin, plavix   - BP well controlled, monitor routine hemodynamics 127/58  - continue CCB, BB, ARB with hold parameters     - anemia w/u ongoing   - GI and Hematology/oncology following     - Monitor and replete Lytes. Keep K > 4 and Mg > 2  - Will continue to follow.    Ingrid Shepard FNP-C  Cardiology NP  [id number] [phone]     77 year old male with history of CABG in 1995, S/P stent x1 placement in 2008, HTN, hyperlipidemia, presented to the ED with the c/o sudden onset of left-sided chest pain admitted for new LBBB and rising trops     Elevated Trops, HTN   -p/w chest pain, +elevated hTrops, peaked and downtrending.  - Patient is not complaining of any cardiac symptoms at this time  - CPK, CKMB, flat , likely demand in the setting of anemia   - EKG shows new LBBB when compared to last EKG down as out patient on 12/24/22,  repeat EKG, NSR   -tele with SR with LBBB   - CT C/A/P - Left ventricular aneurysm, Lrenal neoplasm, pending official read , followed by hem onc   - 2decho as above normal lv 55-60% hypokinesis of mid to basal inferior wall mild mr, trace tr   - sp hep gtt x 48 hours   - not optimal candidate for LHC would opt for medical management  at this time given hem onc w/u  - continue to monitor for anginal complaints   - continue ASA, statin, plavix   - BP well controlled, monitor routine hemodynamics 127/58  - continue CCB, BB, ARB with hold parameters     - anemia w/u ongoing   - GI and Hematology/oncology following     - Monitor and replete Lytes. Keep K > 4 and Mg > 2  - Will continue to follow.    Ingrid Shepard FNP-C  Cardiology NP  [id number] [phone]

## 2023-01-07 NOTE — PROGRESS NOTE ADULT - PROBLEM SELECTOR PLAN 8
On heparin gtt      Electrolytes  Ca borderline - monitor - likely hypercalcemia of malignancy      #DISPO  PT consulted - f/u  Plan for bone scan followed by bone bx

## 2023-01-07 NOTE — PROGRESS NOTE ADULT - SUBJECTIVE AND OBJECTIVE BOX
Interval History:  continues with cardiac evaluation and management  Chart reviewed and events noted;   Overnight events:    MEDICATIONS  (STANDING):  amLODIPine   Tablet 10 milliGRAM(s) Oral daily  aspirin  chewable 81 milliGRAM(s) Oral daily  ezetimibe 10 milliGRAM(s) Oral daily  influenza  Vaccine (HIGH DOSE) 0.7 milliLiter(s) IntraMuscular once  isosorbide   mononitrate ER Tablet (IMDUR) 30 milliGRAM(s) Oral daily  lisinopril 20 milliGRAM(s) Oral daily  metoprolol succinate  milliGRAM(s) Oral daily  predniSONE   Tablet 40 milliGRAM(s) Oral at bedtime    MEDICATIONS  (PRN):  LORazepam   Injectable 0.25 milliGRAM(s) IV Push once PRN Anxiety      Vital Signs Last 24 Hrs  T(C): 36.6 (07 Jan 2023 11:25), Max: 36.6 (07 Jan 2023 02:21)  T(F): 97.8 (07 Jan 2023 11:25), Max: 97.8 (07 Jan 2023 02:21)  HR: 84 (07 Jan 2023 11:25) (70 - 97)  BP: 113/69 (07 Jan 2023 11:25) (113/66 - 127/58)  BP(mean): --  RR: 18 (07 Jan 2023 11:25) (17 - 18)  SpO2: 97% (07 Jan 2023 11:25) (92% - 97%)    Parameters below as of 07 Jan 2023 11:25  Patient On (Oxygen Delivery Method): room air        PHYSICAL EXAM  General: adult in NAD  HEENT: clear oropharynx, anicteric sclera, pink conjunctivae  Neck: supple  CV: normal S1S2 with no murmur rubs or gallops  Lungs: clear to auscultation, no wheezes, no rhales  Abdomen: soft non-tender non-distended, no hepato/splenomegaly  Ext: no clubbing cyanosis or edema  Skin: no rashes and no petichiae  Neuro: alert and oriented X3 no focal deficits      LABS:  CBC Full  -  ( 07 Jan 2023 14:08 )  WBC Count : x  RBC Count : x  Hemoglobin : 8.1 g/dL  Hematocrit : 26.6 %  Platelet Count - Automated : x  Mean Cell Volume : x  Mean Cell Hemoglobin : x  Mean Cell Hemoglobin Concentration : x  Auto Neutrophil # : x  Auto Lymphocyte # : x  Auto Monocyte # : x  Auto Eosinophil # : x  Auto Basophil # : x  Auto Neutrophil % : x  Auto Lymphocyte % : x  Auto Monocyte % : x  Auto Eosinophil % : x  Auto Basophil % : x    01-07    139  |  104  |  20  ----------------------------<  164<H>  4.1   |  26  |  1.10    Ca    9.5      07 Jan 2023 04:08  Phos  3.8     01-07  Mg     2.4     01-07    TPro  8.2  /  Alb  1.9<L>  /  TBili  0.3  /  DBili  x   /  AST  91<H>  /  ALT  120<H>  /  AlkPhos  126<H>  01-07    PTT - ( 07 Jan 2023 04:08 )  PTT:48.8 sec    fe studies  Iron - Total Binding Capacity.: 251 ug/dL (01-05 @ 09:35)  Iron - Total Binding Capacity.: 264 ug/dL (01-05 @ 06:45)  Ferritin, Serum: 860 ng/mL (01-05 @ 06:45)  Ferritin, Serum: 889 ng/mL (01-04 @ 19:16)      WBC trend  8.41 K/uL (01-07-23 @ 04:08)  8.06 K/uL (01-06-23 @ 08:46)  8.18 K/uL (01-05-23 @ 09:35)  8.18 K/uL (01-05-23 @ 06:45)      Hgb trend  8.1 g/dL (01-07-23 @ 14:08)  8.1 g/dL (01-07-23 @ 04:08)  8.9 g/dL (01-06-23 @ 08:46)  9.3 g/dL (01-05-23 @ 09:35)  8.7 g/dL (01-05-23 @ 06:45)      plt trend  447 K/uL (01-07-23 @ 04:08)  492 K/uL (01-06-23 @ 08:46)  509 K/uL (01-05-23 @ 09:35)  437 K/uL (01-05-23 @ 06:45)        RADIOLOGY & ADDITIONAL STUDIES:

## 2023-01-07 NOTE — PROGRESS NOTE ADULT - PROBLEM SELECTOR PLAN 2
CT C/A/P    - L renal neoplasm [id number] cm    - LLL nodule concern for mets    - L4 lucent lesion c/f bone mets 1.5 x 1.4 cm  Case d/w cardio and heme/onc - plan for bone scan when available followed by bone biopsy - Plavix held 1/7  Heme/onc following  Uro consulted    - F/u op upon d/c CT C/A/P    - L renal neoplasm [id number] cm    - LLL nodule concern for mets    - L4 lucent lesion c/f bone mets 1.5 x 1.4 cm  Suspected renal malignancy with mets - severe night sweats - will attempt 40mg prednisone QPM  Case d/w cardio and heme/onc - plan for bone scan when available followed by bone biopsy - Plavix held 1/7  Heme/onc following  Uro consulted    - F/u op upon d/c

## 2023-01-07 NOTE — PROGRESS NOTE ADULT - SUBJECTIVE AND OBJECTIVE BOX
[facility] -- Luis Cha Pannella, Patel, Sav[facility]  Office # [phone]      Follow Up:    chest pain  Subjective/Observations:   No events overnight resting comfortably in bed.  No complaints of chest pain, dyspnea, or palpitations reported. No signs of orthopnea or PND.      REVIEW OF SYSTEMS: All other review of systems is negative unless indicated above    PAST MEDICAL & SURGICAL HISTORY:  HTN - Hypertension      H/O hyperlipidemia      CAD, multiple vessel      Renal colic      Hypertension      S/P CABG x 3        S/P coronary artery stent placement  2 stents - 3 years ago      S/P knee surgery  arthroscopic b/l      S/P hernia repair      S/P CABG x 4      S/P coronary artery stent placement          MEDICATIONS  (STANDING):  amLODIPine   Tablet 10 milliGRAM(s) Oral daily  aspirin  chewable 81 milliGRAM(s) Oral daily  clopidogrel Tablet 75 milliGRAM(s) Oral daily  ezetimibe 10 milliGRAM(s) Oral daily  influenza  Vaccine (HIGH DOSE) 0.7 milliLiter(s) IntraMuscular once  isosorbide   mononitrate ER Tablet (IMDUR) 30 milliGRAM(s) Oral daily  lisinopril 20 milliGRAM(s) Oral daily  metoprolol succinate  milliGRAM(s) Oral daily  predniSONE   Tablet 10 milliGRAM(s) Oral at bedtime    MEDICATIONS  (PRN):  LORazepam   Injectable 0.25 milliGRAM(s) IV Push once PRN Anxiety      Allergies    No Known Allergies    Intolerances        Vital Signs Last 24 Hrs  T(C): 36.4 (2023 04:21), Max: 36.6 (2023 02:21)  T(F): 97.6 (2023 04:21), Max: 97.8 (2023 02:21)  HR: 70 (2023 06:18) (70 - 97)  BP: 127/58 (2023 06:18) (104/69 - 132/76)  BP(mean): --  RR: 18 (2023 06:18) (17 - 18)  SpO2: 92% (2023 06:18) (92% - 95%)    Parameters below as of 2023 06:18  Patient On (Oxygen Delivery Method): room air        I&O's Summary    2023 07:01  -  2023 07:00  --------------------------------------------------------  IN: 363 mL / OUT: 2100 mL / NET: -1737 mL          PHYSICAL EXAM:  TELE: SR with LBBB   Constitutional: NAD, awake and alert, well-developed  HEENT: Moist Mucous Membranes, Anicteric  Pulmonary: Non-labored, breath sounds are clear bilaterally, No wheezing, crackles or rhonchi  Cardiovascular: Regular, S1 and S2 nl, No murmurs, rubs, gallops or clicks  Gastrointestinal: Bowel Sounds present, soft, nontender.   Lymph: No lymphadenopathy. No peripheral edema.  Skin: No visible rashes or ulcers.  Psych:  Mood & affect appropriate    LABS: All Labs Reviewed:                        8.1    8.41  )-----------( 447      ( 2023 04:08 )             26.0                         8.9    8.06  )-----------( 492      ( 2023 08:46 )             29.4                         9.3    8.18  )-----------( 509      ( 2023 09:35 )             30.0     2023 04:08    139    |  104    |  20     ----------------------------<  164    4.1     |  26     |  1.10   2023 08:46    139    |  104    |  20     ----------------------------<  132    4.1     |  26     |  1.10   2023 06:45    138    |  103    |  15     ----------------------------<  146    3.6     |  26     |  0.88     Ca    9.5        2023 04:08  Ca    10.2       2023 08:46  Ca    9.3        2023 06:45  Phos  3.8       2023 04:08  Phos  4.4       2023 08:46  Mg     2.4       2023 04:08  Mg     2.6       2023 08:46    TPro  8.2    /  Alb  1.9    /  TBili  0.3    /  DBili  x      /  AST  91     /  ALT  120    /  AlkPhos  126    2023 04:08  TPro  8.7    /  Alb  2.0    /  TBili  0.3    /  DBili  x      /  AST  80     /  ALT  108    /  AlkPhos  140    2023 08:46  TPro  8.2    /  Alb  1.9    /  TBili  0.3    /  DBili  x      /  AST  79     /  ALT  111    /  AlkPhos  132    2023 06:45    PTT - ( 2023 04:08 )  PTT:48.8 sec  CARDIAC MARKERS ( 2023 04:08 )  x     / x     / 44 U/L / x     / <1.0 ng/mL         EC Lead ECG:   Ventricular Rate 90 BPM    Atrial Rate 90 BPM    P-R Interval 172 ms    QRS Duration 104 ms    Q-T Interval 384 ms    QTC Calculation(Bazett) 469 ms    P Axis 58 degrees    R Axis 36 degrees    T Axis 54 degrees    Diagnosis Line Normal sinus rhythm  Possible Left atrial enlargement  Minimal voltage criteria for LVH, may be normal variant ( [place] product )  Inferior infarct (cited on or before 2023)  Possible Anterior infarct (cited on or before 2023)  Confirmed by HEIDY MAI ([id number]) on 2023 5:41:29 PM (23 @ 13:54)      ACC: [id number] EXAM:  ECHO TTE WO CON COMP W DOPP                          PROCEDURE DATE:  2023          INTERPRETATION:  INDICATION: Chest pain  Sonographer PH    Blood Pressure 136/72    Height 170 cm     Weight 75 kg    Dimensions:  LA 3.8      Normal Values: 2.0 - 4.0 cm  Ao 3.2        Normal Values: 2.0 - 3.8 cm  SEPTUM 0.9       Normal Values: 0.6 - 1.2 cm  PWT 0.8       Normal Values: 0.6 - 1.1 cm  LVIDd 4.0         Normal Values: 3.0 - 5.6 cm  LVIDs 2.7         Normal Values: 1.8- 4.0 cm      OBSERVATIONS:  Mitral Valve: Mitral annular calcification with thickened leaflets. Mild   MR.  Aortic Valve/Aorta: Calcified trileaflet aortic valve with grossly normal   opening.  Tricuspid Valve: normal with trace TR.  Pulmonic Valve:Not well-visualized  Left Atrium: normal  Right Atrium: Not well-visualized  Left Ventricle: normal LV size and systolic function, estimated LVEF of   55-60%. There is hypokinesis of the mid to basal inferior wall  Right Ventricle: Grossly normal size and systolic function.  Pericardium: no significant pericardial effusion.        IMPRESSION:  normal LV size and systolic function, estimated LVEF of 55-60%. There is   hypokinesis of the mid to basal inferior wall  Grossly normal RV size and systolic function.  Calcified trileaflet aortic valve with grossly normal opening, without AI.  Mild MR  Trace TR.  No significant pericardial effusion.    --- End of Report ---            KIMBERLY GODFREY MD; Attending Cardiologist  This document has been electronically signed. 2023 11:06AM      Radiology:         [facility] -- Luis Cha Pannella, Patel, Savella [facility]  Office # [phone]      Follow Up:    chest pain  Subjective/Observations:   No events overnight resting comfortably in bed sleeping remains on room air   overnight with wide complex tachycardia , reviewed SR with LBBB     REVIEW OF SYSTEMS: All other review of systems is negative unless indicated above    PAST MEDICAL & SURGICAL HISTORY:  HTN - Hypertension      H/O hyperlipidemia      CAD, multiple vessel      Renal colic      Hypertension      S/P CABG x 3        S/P coronary artery stent placement  2 stents - 3 years ago      S/P knee surgery  arthroscopic b/l      S/P hernia repair      S/P CABG x 4      S/P coronary artery stent placement          MEDICATIONS  (STANDING):  amLODIPine   Tablet 10 milliGRAM(s) Oral daily  aspirin  chewable 81 milliGRAM(s) Oral daily  clopidogrel Tablet 75 milliGRAM(s) Oral daily  ezetimibe 10 milliGRAM(s) Oral daily  influenza  Vaccine (HIGH DOSE) 0.7 milliLiter(s) IntraMuscular once  isosorbide   mononitrate ER Tablet (IMDUR) 30 milliGRAM(s) Oral daily  lisinopril 20 milliGRAM(s) Oral daily  metoprolol succinate  milliGRAM(s) Oral daily  predniSONE   Tablet 10 milliGRAM(s) Oral at bedtime    MEDICATIONS  (PRN):  LORazepam   Injectable 0.25 milliGRAM(s) IV Push once PRN Anxiety      Allergies    No Known Allergies    Intolerances        Vital Signs Last 24 Hrs  T(C): 36.4 (2023 04:21), Max: 36.6 (2023 02:21)  T(F): 97.6 (2023 04:21), Max: 97.8 (2023 02:21)  HR: 70 (2023 06:18) (70 - 97)  BP: 127/58 (2023 06:18) (104/69 - 132/76)  BP(mean): --  RR: 18 (2023 06:18) (17 - 18)  SpO2: 92% (2023 06:18) (92% - 95%)    Parameters below as of 2023 06:18  Patient On (Oxygen Delivery Method): room air        I&O's Summary    2023 07:01  -  2023 07:00  --------------------------------------------------------  IN: 363 mL / OUT: 2100 mL / NET: -1737 mL          PHYSICAL EXAM:  TELE: SR with LBBB   Constitutional: NAD, awake and alert, well-developed  HEENT: Moist Mucous Membranes, Anicteric  Pulmonary: Non-labored, breath sounds are clear bilaterally, No wheezing, crackles or rhonchi  Cardiovascular: Regular, S1 and S2 nl, No murmurs, rubs, gallops or clicks  Gastrointestinal: Bowel Sounds present, soft, nontender.   Lymph: No lymphadenopathy. No peripheral edema.  Skin: No visible rashes or ulcers.  Psych:  Mood & affect appropriate    LABS: All Labs Reviewed:                        8.1    8.41  )-----------( 447      ( 2023 04:08 )             26.0                         8.9    8.06  )-----------( 492      ( 2023 08:46 )             29.4                         9.3    8.18  )-----------( 509      ( 2023 09:35 )             30.0     2023 04:08    139    |  104    |  20     ----------------------------<  164    4.1     |  26     |  1.10   2023 08:46    139    |  104    |  20     ----------------------------<  132    4.1     |  26     |  1.10   2023 06:45    138    |  103    |  15     ----------------------------<  146    3.6     |  26     |  0.88     Ca    9.5        2023 04:08  Ca    10.2       2023 08:46  Ca    9.3        2023 06:45  Phos  3.8       2023 04:08  Phos  4.4       2023 08:46  Mg     2.4       2023 04:08  Mg     2.6       2023 08:46    TPro  8.2    /  Alb  1.9    /  TBili  0.3    /  DBili  x      /  AST  91     /  ALT  120    /  AlkPhos  126    2023 04:08  TPro  8.7    /  Alb  2.0    /  TBili  0.3    /  DBili  x      /  AST  80     /  ALT  108    /  AlkPhos  140    2023 08:46  TPro  8.2    /  Alb  1.9    /  TBili  0.3    /  DBili  x      /  AST  79     /  ALT  111    /  AlkPhos  132    2023 06:45    PTT - ( 2023 04:08 )  PTT:48.8 sec  CARDIAC MARKERS ( 2023 04:08 )  x     / x     / 44 U/L / x     / <1.0 ng/mL         EC Lead ECG:   Ventricular Rate 90 BPM    Atrial Rate 90 BPM    P-R Interval 172 ms    QRS Duration 104 ms    Q-T Interval 384 ms    QTC Calculation(Bazett) 469 ms    P Axis 58 degrees    R Axis 36 degrees    T Axis 54 degrees    Diagnosis Line Normal sinus rhythm  Possible Left atrial enlargement  Minimal voltage criteria for LVH, may be normal variant ( [place] product )  Inferior infarct (cited on or before 2023)  Possible Anterior infarct (cited on or before 2023)  Confirmed by HEIDY MAI ([id number]) on 2023 5:41:29 PM (23 @ 13:54)      ACC: [id number] EXAM:  ECHO TTE WO CON COMP W DOPP                          PROCEDURE DATE:  2023          INTERPRETATION:  INDICATION: Chest pain  Sonographer PH    Blood Pressure 136/72    Height 170 cm     Weight 75 kg    Dimensions:  LA 3.8      Normal Values: 2.0 - 4.0 cm  Ao 3.2        Normal Values: 2.0 - 3.8 cm  SEPTUM 0.9       Normal Values: 0.6 - 1.2 cm  PWT 0.8       Normal Values: 0.6 - 1.1 cm  LVIDd 4.0         Normal Values: 3.0 - 5.6 cm  LVIDs 2.7         Normal Values: 1.8- 4.0 cm      OBSERVATIONS:  Mitral Valve: Mitral annular calcification with thickened leaflets. Mild   MR.  Aortic Valve/Aorta: Calcified trileaflet aortic valve with grossly normal   opening.  Tricuspid Valve: normal with trace TR.  Pulmonic Valve:Not well-visualized  Left Atrium: normal  Right Atrium: Not well-visualized  Left Ventricle: normal LV size and systolic function, estimated LVEF of   55-60%. There is hypokinesis of the mid to basal inferior wall  Right Ventricle: Grossly normal size and systolic function.  Pericardium: no significant pericardial effusion.        IMPRESSION:  normal LV size and systolic function, estimated LVEF of 55-60%. There is   hypokinesis of the mid to basal inferior wall  Grossly normal RV size and systolic function.  Calcified trileaflet aortic valve with grossly normal opening, without AI.  Mild MR  Trace TR.  No significant pericardial effusion.

## 2023-01-07 NOTE — PROGRESS NOTE ADULT - SUBJECTIVE AND OBJECTIVE BOX
[facility]  Vivek Cole PA-C  [address] [place] [address]  [phone]      INTERVAL HPI/OVERNIGHT EVENTS:  Pt s/e  Reports no overt GI bleeding    MEDICATIONS  (STANDING):  amLODIPine   Tablet 10 milliGRAM(s) Oral daily  aspirin  chewable 81 milliGRAM(s) Oral daily  clopidogrel Tablet 75 milliGRAM(s) Oral daily  ezetimibe 10 milliGRAM(s) Oral daily  heparin  Infusion.  Unit(s)/Hr (9 mL/Hr) IV Continuous <Continuous>  influenza  Vaccine (HIGH DOSE) 0.7 milliLiter(s) IntraMuscular once  isosorbide   mononitrate ER Tablet (IMDUR) 30 milliGRAM(s) Oral daily  lisinopril 20 milliGRAM(s) Oral daily  predniSONE   Tablet 10 milliGRAM(s) Oral at bedtime    MEDICATIONS  (PRN):  heparin   Injectable 4400 Unit(s) IV Push every 6 hours PRN For aPTT less than 40  LORazepam   Injectable 0.25 milliGRAM(s) IV Push once PRN Anxiety      Allergies    No Known Allergies      PHYSICAL EXAM:   Vital Signs:  Vital Signs Last 24 Hrs  T(C): 36.8 (2023 04:47), Max: 37.3 (2023 23:46)  T(F): 98.3 (2023 04:47), Max: 99.1 (2023 23:46)  HR: 85 (2023 11:50) (85 - 99)  BP: 132/76 (2023 11:50) (108/64 - 134/76)  BP(mean): 77 (2023 15:20) (77 - 77)  RR: 18 (2023 04:47) (18 - 18)  SpO2: 96% (2023 04:47) (96% - 97%)    Parameters below as of 2023 04:47  Patient On (Oxygen Delivery Method): room air      Daily     Daily Weight in k.6 (2023 04:47)    GENERAL:  Appears stated age  ABDOMEN:  Soft, non-tender, non-distended  NEURO:  Alert      LABS:                        8.9    8.06  )-----------( 492      ( 2023 08:46 )             29.4     -    139  |  104  |  20  ----------------------------<  132<H>  4.1   |  26  |  1.10    Ca    10.2<H>      2023 08:46  Phos  4.4       Mg     2.6         TPro  8.7<H>  /  Alb  2.0<L>  /  TBili  0.3  /  DBili  x   /  AST  80<H>  /  ALT  108<H>  /  AlkPhos  140<H>      PT/INR - ( 2023 19:16 )   PT: 14.7 sec;   INR: 1.25 ratio         PTT - ( 2023 01:45 )  PTT:68.2 sec  Urinalysis Basic - ( 2023 18:18 )    Color: Yellow / Appearance: Clear / S.010 / pH: x  Gluc: x / Ketone: Negative  / Bili: Negative / Urobili: Negative   Blood: x / Protein: 100 / Nitrite: Negative   Leuk Esterase: Negative / RBC: 0-2 /HPF / WBC 3-5   Sq Epi: x / Non Sq Epi: Occasional / Bacteria: x

## 2023-01-07 NOTE — PROGRESS NOTE ADULT - ASSESSMENT
Left renal mass c/w renal cell carcinoma with metastatic disease to the lung and L4 vertebral body  Chest pain with ?NSTEMI  Anemia most likely secondary to acute/chronic disease with fe studies c/w diagnosis.  no obvious evidence of bleeding    Recommendations:  1.  follow CBC and transfuse as indicated  2.  continue cardiac workup  3.  Needs a biopsy of either lung or L4 but will need to arrange pending cardiology evaluation.    4.  Has large left renal mass with low burden metastatic disease and may potentially benefit from nephrectomy  5.  continue cardiac evaluation and treatment and potential clearance for nephrectomy  discussed with Dr. Schultz, IR and Dr. Olsen.  may potentially have IR guided bone biopsy done during hospitalization. to hold plavix and will obtain bone scan. IR willing to do on asa.  potentially scheduled for monday

## 2023-01-07 NOTE — PROGRESS NOTE ADULT - PROBLEM SELECTOR PLAN 3
Baseline of 14  Recent hx of anemia being worked up outpt, pt reported UA as outpatient showed proteuria and hematuria - pt had GI and urology appts scheduled for next week  Today Hgb 8.1 (2 pm repeat stable at 8.1); HB on admission 8.6; Baseline of ~14 in July of 2020 with progressive drop  - Likely anemia of chronic disease related to an underlying renal malignancy  Heme/onc consulted recs appreciated  GI consulted recs appreciated

## 2023-01-07 NOTE — PROGRESS NOTE ADULT - SUBJECTIVE AND OBJECTIVE BOX
Patient is a 77y old  Male who presents with a chief complaint of chest pain, r/o ACS (07 Jan 2023 12:04)      Subjective:  INTERVAL HPI/OVERNIGHT EVENTS: Patient seen and examined at bedside. Overnight patient with run of wide complex tachycardia, 5 minutes. D/w cardio not concerning for Vtach. Patient with persistent night sweats. Attempted low dose prednisone for night sweats, no improvement Patient has no complaints at this time. Denies fevers, chills, headache, lightheadedness, chest pain, dyspnea, abdominal pain, n/v/d/c.    MEDICATIONS  (STANDING):  amLODIPine   Tablet 10 milliGRAM(s) Oral daily  aspirin  chewable 81 milliGRAM(s) Oral daily  ezetimibe 10 milliGRAM(s) Oral daily  influenza  Vaccine (HIGH DOSE) 0.7 milliLiter(s) IntraMuscular once  isosorbide   mononitrate ER Tablet (IMDUR) 30 milliGRAM(s) Oral daily  lisinopril 20 milliGRAM(s) Oral daily  metoprolol succinate  milliGRAM(s) Oral daily  predniSONE   Tablet 10 milliGRAM(s) Oral at bedtime    MEDICATIONS  (PRN):  LORazepam   Injectable 0.25 milliGRAM(s) IV Push once PRN Anxiety      Allergies    No Known Allergies    Intolerances        REVIEW OF SYSTEMS:  CONSTITUTIONAL: + Night sweats. No fever or chills  HEENT:  No headache, no sore throat  RESPIRATORY: No cough, wheezing, or shortness of breath  CARDIOVASCULAR: No chest pain, palpitations  GASTROINTESTINAL: No abd pain, nausea, vomiting, or diarrhea  GENITOURINARY: No dysuria, frequency, gross hematuria  NEUROLOGICAL: no focal weakness or dizziness  MUSCULOSKELETAL: no myalgias     Objective:  Vital Signs Last 24 Hrs  T(C): 36.6 (07 Jan 2023 11:25), Max: 36.6 (07 Jan 2023 02:21)  T(F): 97.8 (07 Jan 2023 11:25), Max: 97.8 (07 Jan 2023 02:21)  HR: 84 (07 Jan 2023 11:25) (70 - 97)  BP: 113/69 (07 Jan 2023 11:25) (113/66 - 127/58)  BP(mean): --  RR: 18 (07 Jan 2023 11:25) (17 - 18)  SpO2: 97% (07 Jan 2023 11:25) (92% - 97%)    Parameters below as of 07 Jan 2023 11:25  Patient On (Oxygen Delivery Method): room air        PE:  GENERAL: NAD, lying in bed comfortably  HEAD:  Atraumatic, Normocephalic  EYES: EOMI, conjunctiva and sclera clear  ENT: Moist mucous membranes  NECK: Supple, No JVD  CHEST/LUNG: Clear to auscultation bilaterally; No rales, rhonchi, wheezing, or rubs. Unlabored respirations  HEART: Regular rate and rhythm; No murmurs, rubs, or gallops  ABDOMEN: Soft, Nontender, Nondistended  EXTREMITIES:  No clubbing, cyanosis, or edema  NERVOUS SYSTEM:  Alert & Oriented X3, speech clear. No gross deficits appreciated.    LABS:                        8.1    x     )-----------( x        ( 07 Jan 2023 14:08 )             26.6     CBC Full  -  ( 07 Jan 2023 14:08 )  WBC Count : x  Hemoglobin : 8.1 g/dL  Hematocrit : 26.6 %  Platelet Count - Automated : x  Mean Cell Volume : x  Mean Cell Hemoglobin : x  Mean Cell Hemoglobin Concentration : x  Auto Neutrophil # : x  Auto Lymphocyte # : x  Auto Monocyte # : x  Auto Eosinophil # : x  Auto Basophil # : x  Auto Neutrophil % : x  Auto Lymphocyte % : x  Auto Monocyte % : x  Auto Eosinophil % : x  Auto Basophil % : x    07 Jan 2023 04:08    139    |  104    |  20     ----------------------------<  164    4.1     |  26     |  1.10     Ca    9.5        07 Jan 2023 04:08  Phos  3.8       07 Jan 2023 04:08  Mg     2.4       07 Jan 2023 04:08    TPro  8.2    /  Alb  1.9    /  TBili  0.3    /  DBili  x      /  AST  91     /  ALT  120    /  AlkPhos  126    07 Jan 2023 04:08    PTT - ( 07 Jan 2023 04:08 )  PTT:48.8 sec    CAPILLARY BLOOD GLUCOSE              RADIOLOGY & ADDITIONAL TESTS:    Personally reviewed.     Consultant(s) Notes Reviewed:  [x] YES  [ ] NO

## 2023-01-07 NOTE — PROGRESS NOTE ADULT - PROBLEM SELECTOR PLAN 1
Chest pain - etiology likely ACS with new LBBB on EKG though mixed picture with slow progressive anemia causing demand  Trops ~ 80 trended to peak 2005.2  S/p asa 324 and nitroglycerin x1 by EMS  ACS treatment initiated    - Heparin gtt 48 hrs completed    - Continue ASA, Imdur, bb    - Hold Plavix for pending bone biopsy    - Hold home atorvastatin - transaminitis  CT C/A/P - Left ventricular aneurysm  Echo this admission: LVEF 55-60; hypokinesis of the mid to basal inferior wall; Grossly normal RV size and systolic function. Calcified trileaflet aortic valve with grossly normal opening, without AI. Mild MR. Trace TR.  Cardiology

## 2023-01-08 LAB
ALBUMIN SERPL ELPH-MCNC: 1.9 G/DL — LOW (ref 3.3–5)
ALBUMIN SERPL ELPH-MCNC: 2 G/DL — LOW (ref 3.3–5)
ALP SERPL-CCNC: 128 U/L — HIGH (ref 40–120)
ALP SERPL-CCNC: 129 U/L — HIGH (ref 40–120)
ALT FLD-CCNC: 110 U/L — HIGH (ref 12–78)
ALT FLD-CCNC: 121 U/L — HIGH (ref 12–78)
ANION GAP SERPL CALC-SCNC: 9 MMOL/L — SIGNIFICANT CHANGE UP (ref 5–17)
ANION GAP SERPL CALC-SCNC: 9 MMOL/L — SIGNIFICANT CHANGE UP (ref 5–17)
AST SERPL-CCNC: 77 U/L — HIGH (ref 15–37)
AST SERPL-CCNC: 90 U/L — HIGH (ref 15–37)
BASOPHILS # BLD AUTO: 0.01 K/UL — SIGNIFICANT CHANGE UP (ref 0–0.2)
BASOPHILS NFR BLD AUTO: 0.1 % — SIGNIFICANT CHANGE UP (ref 0–2)
BILIRUB SERPL-MCNC: 0.2 MG/DL — SIGNIFICANT CHANGE UP (ref 0.2–1.2)
BILIRUB SERPL-MCNC: 0.2 MG/DL — SIGNIFICANT CHANGE UP (ref 0.2–1.2)
BUN SERPL-MCNC: 25 MG/DL — HIGH (ref 7–23)
BUN SERPL-MCNC: 28 MG/DL — HIGH (ref 7–23)
CALCIUM SERPL-MCNC: 10 MG/DL — SIGNIFICANT CHANGE UP (ref 8.5–10.1)
CALCIUM SERPL-MCNC: 9.4 MG/DL — SIGNIFICANT CHANGE UP (ref 8.5–10.1)
CHLORIDE SERPL-SCNC: 103 MMOL/L — SIGNIFICANT CHANGE UP (ref 96–108)
CHLORIDE SERPL-SCNC: 105 MMOL/L — SIGNIFICANT CHANGE UP (ref 96–108)
CO2 SERPL-SCNC: 25 MMOL/L — SIGNIFICANT CHANGE UP (ref 22–31)
CO2 SERPL-SCNC: 25 MMOL/L — SIGNIFICANT CHANGE UP (ref 22–31)
CREAT SERPL-MCNC: 1.2 MG/DL — SIGNIFICANT CHANGE UP (ref 0.5–1.3)
CREAT SERPL-MCNC: 1.3 MG/DL — SIGNIFICANT CHANGE UP (ref 0.5–1.3)
EGFR: 57 ML/MIN/1.73M2 — LOW
EGFR: 63 ML/MIN/1.73M2 — SIGNIFICANT CHANGE UP
EOSINOPHIL # BLD AUTO: 0.01 K/UL — SIGNIFICANT CHANGE UP (ref 0–0.5)
EOSINOPHIL NFR BLD AUTO: 0.1 % — SIGNIFICANT CHANGE UP (ref 0–6)
GLUCOSE SERPL-MCNC: 154 MG/DL — HIGH (ref 70–99)
GLUCOSE SERPL-MCNC: 177 MG/DL — HIGH (ref 70–99)
HCT VFR BLD CALC: 27.7 % — LOW (ref 39–50)
HCT VFR BLD CALC: 30.9 % — LOW (ref 39–50)
HGB BLD-MCNC: 8.4 G/DL — LOW (ref 13–17)
HGB BLD-MCNC: 9 G/DL — LOW (ref 13–17)
IMM GRANULOCYTES NFR BLD AUTO: 0.7 % — SIGNIFICANT CHANGE UP (ref 0–0.9)
LACTATE SERPL-SCNC: 1.5 MMOL/L — SIGNIFICANT CHANGE UP (ref 0.7–2)
LYMPHOCYTES # BLD AUTO: 0.69 K/UL — LOW (ref 1–3.3)
LYMPHOCYTES # BLD AUTO: 9.3 % — LOW (ref 13–44)
MAGNESIUM SERPL-MCNC: 2.6 MG/DL — SIGNIFICANT CHANGE UP (ref 1.6–2.6)
MCHC RBC-ENTMCNC: 24.4 PG — LOW (ref 27–34)
MCHC RBC-ENTMCNC: 24.5 PG — LOW (ref 27–34)
MCHC RBC-ENTMCNC: 29.1 GM/DL — LOW (ref 32–36)
MCHC RBC-ENTMCNC: 30.3 GM/DL — LOW (ref 32–36)
MCV RBC AUTO: 80.5 FL — SIGNIFICANT CHANGE UP (ref 80–100)
MCV RBC AUTO: 84.2 FL — SIGNIFICANT CHANGE UP (ref 80–100)
MONOCYTES # BLD AUTO: 0.17 K/UL — SIGNIFICANT CHANGE UP (ref 0–0.9)
MONOCYTES NFR BLD AUTO: 2.3 % — SIGNIFICANT CHANGE UP (ref 2–14)
NEUTROPHILS # BLD AUTO: 6.48 K/UL — SIGNIFICANT CHANGE UP (ref 1.8–7.4)
NEUTROPHILS NFR BLD AUTO: 87.5 % — HIGH (ref 43–77)
NRBC # BLD: 0 /100 WBCS — SIGNIFICANT CHANGE UP (ref 0–0)
NRBC # BLD: 0 /100 WBCS — SIGNIFICANT CHANGE UP (ref 0–0)
PHOSPHATE SERPL-MCNC: 4.4 MG/DL — SIGNIFICANT CHANGE UP (ref 2.5–4.5)
PLATELET # BLD AUTO: 401 K/UL — HIGH (ref 150–400)
PLATELET # BLD AUTO: 493 K/UL — HIGH (ref 150–400)
POTASSIUM SERPL-MCNC: 4.3 MMOL/L — SIGNIFICANT CHANGE UP (ref 3.5–5.3)
POTASSIUM SERPL-MCNC: 4.6 MMOL/L — SIGNIFICANT CHANGE UP (ref 3.5–5.3)
POTASSIUM SERPL-SCNC: 4.3 MMOL/L — SIGNIFICANT CHANGE UP (ref 3.5–5.3)
POTASSIUM SERPL-SCNC: 4.6 MMOL/L — SIGNIFICANT CHANGE UP (ref 3.5–5.3)
PROT SERPL-MCNC: 8.5 G/DL — HIGH (ref 6–8.3)
PROT SERPL-MCNC: 8.7 G/DL — HIGH (ref 6–8.3)
RBC # BLD: 3.44 M/UL — LOW (ref 4.2–5.8)
RBC # BLD: 3.67 M/UL — LOW (ref 4.2–5.8)
RBC # FLD: 17.5 % — HIGH (ref 10.3–14.5)
RBC # FLD: 17.6 % — HIGH (ref 10.3–14.5)
SARS-COV-2 RNA SPEC QL NAA+PROBE: SIGNIFICANT CHANGE UP
SODIUM SERPL-SCNC: 137 MMOL/L — SIGNIFICANT CHANGE UP (ref 135–145)
SODIUM SERPL-SCNC: 139 MMOL/L — SIGNIFICANT CHANGE UP (ref 135–145)
WBC # BLD: 7.41 K/UL — SIGNIFICANT CHANGE UP (ref 3.8–10.5)
WBC # BLD: 9.48 K/UL — SIGNIFICANT CHANGE UP (ref 3.8–10.5)
WBC # FLD AUTO: 7.41 K/UL — SIGNIFICANT CHANGE UP (ref 3.8–10.5)
WBC # FLD AUTO: 9.48 K/UL — SIGNIFICANT CHANGE UP (ref 3.8–10.5)

## 2023-01-08 PROCEDURE — 71045 X-RAY EXAM CHEST 1 VIEW: CPT | Mod: 26

## 2023-01-08 PROCEDURE — 99233 SBSQ HOSP IP/OBS HIGH 50: CPT | Mod: GC

## 2023-01-08 PROCEDURE — 99233 SBSQ HOSP IP/OBS HIGH 50: CPT

## 2023-01-08 RX ORDER — SODIUM CHLORIDE 9 MG/ML
1000 INJECTION, SOLUTION INTRAVENOUS
Refills: 0 | Status: DISCONTINUED | OUTPATIENT
Start: 2023-01-08 | End: 2023-01-09

## 2023-01-08 RX ORDER — IBUPROFEN 200 MG
600 TABLET ORAL ONCE
Refills: 0 | Status: COMPLETED | OUTPATIENT
Start: 2023-01-08 | End: 2023-01-08

## 2023-01-08 RX ADMIN — ISOSORBIDE MONONITRATE 30 MILLIGRAM(S): 60 TABLET, EXTENDED RELEASE ORAL at 11:58

## 2023-01-08 RX ADMIN — AMLODIPINE BESYLATE 10 MILLIGRAM(S): 2.5 TABLET ORAL at 05:20

## 2023-01-08 RX ADMIN — LISINOPRIL 20 MILLIGRAM(S): 2.5 TABLET ORAL at 05:19

## 2023-01-08 RX ADMIN — Medication 40 MILLIGRAM(S): at 21:22

## 2023-01-08 RX ADMIN — Medication 600 MILLIGRAM(S): at 04:25

## 2023-01-08 RX ADMIN — EZETIMIBE 10 MILLIGRAM(S): 10 TABLET ORAL at 12:01

## 2023-01-08 RX ADMIN — Medication 600 MILLIGRAM(S): at 05:30

## 2023-01-08 RX ADMIN — Medication 200 MILLIGRAM(S): at 05:19

## 2023-01-08 RX ADMIN — Medication 81 MILLIGRAM(S): at 11:58

## 2023-01-08 NOTE — PROGRESS NOTE ADULT - ASSESSMENT
Left renal mass c/w renal cell carcinoma with metastatic disease to the lung and L4 vertebral body  Chest pain with ?NSTEMI  Anemia most likely secondary to acute/chronic disease with fe studies c/w diagnosis.  no obvious evidence of bleeding    Recommendations:  1.  follow CBC and transfuse as indicated  2.  continue cardiac workup  3.  Needs a biopsy of either lung or L4 but will need to arrange pending cardiology evaluation.    4.  Has large left renal mass with low burden metastatic disease and may potentially benefit from nephrectomy  5.  continue cardiac evaluation and treatment and potential clearance for nephrectomy  discussed with Dr. Schultz.  does not want to stop asa and plavix for biopsy.  willing to stop only plavix.  waiting to here back from interventional radiology   after discussion with IR. biopsy to be performed tomorrow with potential discharge after biopsy  to also have bone scan  6.  further heme oncology recommendations pending above  discussed with patient and Dr. Schultz and Pretty

## 2023-01-08 NOTE — PROGRESS NOTE ADULT - SUBJECTIVE AND OBJECTIVE BOX
[facility] -- Luis Cha Pannella, Patel, Savella [facility]  Office # [phone]      Follow Up:    chest pain   Subjective/Observations:   overnight with fever as per notes    No complaints of chest pain, dyspnea, or palpitations reported. No signs of orthopnea or PND.      REVIEW OF SYSTEMS: All other review of systems is negative unless indicated above    PAST MEDICAL & SURGICAL HISTORY:  HTN - Hypertension      H/O hyperlipidemia      CAD, multiple vessel      Renal colic      Hypertension      S/P CABG x 3        S/P coronary artery stent placement  2 stents - 3 years ago      S/P knee surgery  arthroscopic b/l      S/P hernia repair      S/P CABG x 4      S/P coronary artery stent placement          MEDICATIONS  (STANDING):  amLODIPine   Tablet 10 milliGRAM(s) Oral daily  aspirin  chewable 81 milliGRAM(s) Oral daily  ezetimibe 10 milliGRAM(s) Oral daily  influenza  Vaccine (HIGH DOSE) 0.7 milliLiter(s) IntraMuscular once  isosorbide   mononitrate ER Tablet (IMDUR) 30 milliGRAM(s) Oral daily  lisinopril 20 milliGRAM(s) Oral daily  metoprolol succinate  milliGRAM(s) Oral daily  predniSONE   Tablet 40 milliGRAM(s) Oral at bedtime    MEDICATIONS  (PRN):  LORazepam   Injectable 0.25 milliGRAM(s) IV Push once PRN Anxiety      Allergies    No Known Allergies    Intolerances        Vital Signs Last 24 Hrs  T(C): 36.4 (2023 04:38), Max: 38.1 (2023 01:20)  T(F): 97.6 (2023 04:38), Max: 100.5 (2023 01:20)  HR: 77 (2023 04:38) (77 - 84)  BP: 114/77 (2023 04:38) (113/64 - 114/77)  BP(mean): --  RR: 18 (2023 04:38) (16 - 18)  SpO2: 98% (2023 04:38) (93% - 98%)    Parameters below as of 2023 04:38  Patient On (Oxygen Delivery Method): room air        I&O's Summary    2023 07:01  -  2023 07:00  --------------------------------------------------------  IN: 150 mL / OUT: 1100 mL / NET: -950 mL          PHYSICAL EXAM:  TELE: Sr with LBBB   Constitutional: NAD, awake and alert, well-developed  HEENT: Moist Mucous Membranes, Anicteric  Pulmonary: Non-labored, breath sounds are clear bilaterally, No wheezing, crackles or rhonchi  Cardiovascular: Regular, S1 and S2 nl, No murmurs, rubs, gallops or clicks  Gastrointestinal: Bowel Sounds present, soft, nontender.   Lymph: No lymphadenopathy. No peripheral edema.  Skin: No visible rashes or ulcers.  Psych:  Mood & affect appropriate    LABS: All Labs Reviewed:                        8.4    9.48  )-----------( 493      ( 2023 02:50 )             27.7                         8.1    x     )-----------( x        ( 2023 14:08 )             26.6                         8.1    8.41  )-----------( 447      ( 2023 04:08 )             26.0     2023 02:50    137    |  103    |  25     ----------------------------<  177    4.3     |  25     |  1.30   2023 04:08    139    |  104    |  20     ----------------------------<  164    4.1     |  26     |  1.10   2023 08:46    139    |  104    |  20     ----------------------------<  132    4.1     |  26     |  1.10     Ca    9.4        2023 02:50  Ca    9.5        2023 04:08  Ca    10.2       2023 08:46  Phos  3.8       2023 04:08  Phos  4.4       2023 08:46  Mg     2.4       2023 04:08  Mg     2.6       2023 08:46    TPro  8.5    /  Alb  2.0    /  TBili  0.2    /  DBili  x      /  AST  90     /  ALT  121    /  AlkPhos  128    2023 02:50  TPro  8.2    /  Alb  1.9    /  TBili  0.3    /  DBili  x      /  AST  91     /  ALT  120    /  AlkPhos  126    2023 04:08  TPro  8.7    /  Alb  2.0    /  TBili  0.3    /  DBili  x      /  AST  80     /  ALT  108    /  AlkPhos  140    2023 08:46    PTT - ( 2023 04:08 )  PTT:48.8 sec  CARDIAC MARKERS ( 2023 04:08 )  x     / x     / 44 U/L / x     / <1.0 ng/mL         EC Lead ECG:   Ventricular Rate 88 BPM    Atrial Rate 88 BPM    P-R Interval 182 ms    QRS Duration 108 ms    Q-T Interval 382 ms    QTC Calculation(Bazett) 462 ms    P Axis 55 degrees    R Axis 32 degrees    T Axis 87 degrees    Diagnosis Line Normal sinus rhythm  Inferior infarct (cited on or before 2023)  Anterior infarct (cited on or before 2023)  Nonspecific ST abnormality  Abnormal ECG  Confirmed by tabatha Schultz ([id number]) on 2023 1:00:03 PM (23 @ 03:29)      ACC: [id number] EXAM:  ECHO TTE WO CON COMP W DOPP                          PROCEDURE DATE:  2023          INTERPRETATION:  INDICATION: Chest pain  Sonographer PH    Blood Pressure 136/72    Height 170 cm     Weight 75 kg    Dimensions:  LA 3.8      Normal Values: 2.0 - 4.0 cm  Ao 3.2        Normal Values: 2.0 - 3.8 cm  SEPTUM 0.9       Normal Values: 0.6 - 1.2 cm  PWT 0.8       Normal Values: 0.6 - 1.1 cm  LVIDd 4.0         Normal Values: 3.0 - 5.6 cm  LVIDs 2.7         Normal Values: 1.8- 4.0 cm      OBSERVATIONS:  Mitral Valve: Mitral annular calcification with thickened leaflets. Mild   MR.  Aortic Valve/Aorta: Calcified trileaflet aortic valve with grossly normal   opening.  Tricuspid Valve: normal with trace TR.  Pulmonic Valve:Not well-visualized  Left Atrium: normal  Right Atrium: Not well-visualized  Left Ventricle: normal LV size and systolic function, estimated LVEF of   55-60%. There is hypokinesis of the mid to basal inferior wall  Right Ventricle: Grossly normal size and systolic function.  Pericardium: no significant pericardial effusion.        IMPRESSION:  normal LV size and systolic function, estimated LVEF of 55-60%. There is   hypokinesis of the mid to basal inferior wall  Grossly normal RV size and systolic function.  Calcified trileaflet aortic valve with grossly normal opening, without AI.  Mild MR  Trace TR.  No significant pericardial effusion.    --- End of Report ---            KIMBERLY GODFREY MD; Attending Cardiologist  This document has been electronically signed. 2023 11:06AM      Radiology:         [facility] -- Luis Cha Pannella, Patel, Savella [facility]  Office # [phone]      Follow Up:    chest pain   Subjective/Observations:     overnight with low grade fever as per notes    No complaints of chest pain, dyspnea, or palpitations reported. No signs of orthopnea or PND.      REVIEW OF SYSTEMS: All other review of systems is negative unless indicated above    PAST MEDICAL & SURGICAL HISTORY:  HTN - Hypertension      H/O hyperlipidemia      CAD, multiple vessel      Renal colic      Hypertension      S/P CABG x 3        S/P coronary artery stent placement  2 stents - 3 years ago      S/P knee surgery  arthroscopic b/l      S/P hernia repair      S/P CABG x 4      S/P coronary artery stent placement          MEDICATIONS  (STANDING):  amLODIPine   Tablet 10 milliGRAM(s) Oral daily  aspirin  chewable 81 milliGRAM(s) Oral daily  ezetimibe 10 milliGRAM(s) Oral daily  influenza  Vaccine (HIGH DOSE) 0.7 milliLiter(s) IntraMuscular once  isosorbide   mononitrate ER Tablet (IMDUR) 30 milliGRAM(s) Oral daily  lisinopril 20 milliGRAM(s) Oral daily  metoprolol succinate  milliGRAM(s) Oral daily  predniSONE   Tablet 40 milliGRAM(s) Oral at bedtime    MEDICATIONS  (PRN):  LORazepam   Injectable 0.25 milliGRAM(s) IV Push once PRN Anxiety      Allergies    No Known Allergies    Intolerances        Vital Signs Last 24 Hrs  T(C): 36.4 (2023 04:38), Max: 38.1 (2023 01:20)  T(F): 97.6 (2023 04:38), Max: 100.5 (2023 01:20)  HR: 77 (2023 04:38) (77 - 84)  BP: 114/77 (2023 04:38) (113/64 - 114/77)  BP(mean): --  RR: 18 (2023 04:38) (16 - 18)  SpO2: 98% (2023 04:38) (93% - 98%)    Parameters below as of 2023 04:38  Patient On (Oxygen Delivery Method): room air        I&O's Summary    2023 07:01  -  2023 07:00  --------------------------------------------------------  IN: 150 mL / OUT: 1100 mL / NET: -950 mL          PHYSICAL EXAM:  TELE: Sr with LBBB   Constitutional: NAD, awake and alert, well-developed  HEENT: Moist Mucous Membranes, Anicteric  Pulmonary: Non-labored, breath sounds are clear bilaterally, No wheezing, crackles or rhonchi  Cardiovascular: Regular, S1 and S2 nl, No murmurs, rubs, gallops or clicks  Gastrointestinal: Bowel Sounds present, soft, nontender.   Lymph: No lymphadenopathy. No peripheral edema.  Skin: No visible rashes or ulcers.  Psych:  Mood & affect appropriate    LABS: All Labs Reviewed:                        8.4    9.48  )-----------( 493      ( 2023 02:50 )             27.7                         8.1    x     )-----------( x        ( 2023 14:08 )             26.6                         8.1    8.41  )-----------( 447      ( 2023 04:08 )             26.0     2023 02:50    137    |  103    |  25     ----------------------------<  177    4.3     |  25     |  1.30   2023 04:08    139    |  104    |  20     ----------------------------<  164    4.1     |  26     |  1.10   2023 08:46    139    |  104    |  20     ----------------------------<  132    4.1     |  26     |  1.10     Ca    9.4        2023 02:50  Ca    9.5        2023 04:08  Ca    10.2       2023 08:46  Phos  3.8       2023 04:08  Phos  4.4       2023 08:46  Mg     2.4       2023 04:08  Mg     2.6       2023 08:46    TPro  8.5    /  Alb  2.0    /  TBili  0.2    /  DBili  x      /  AST  90     /  ALT  121    /  AlkPhos  128    2023 02:50  TPro  8.2    /  Alb  1.9    /  TBili  0.3    /  DBili  x      /  AST  91     /  ALT  120    /  AlkPhos  126    2023 04:08  TPro  8.7    /  Alb  2.0    /  TBili  0.3    /  DBili  x      /  AST  80     /  ALT  108    /  AlkPhos  140    2023 08:46    PTT - ( 2023 04:08 )  PTT:48.8 sec  CARDIAC MARKERS ( 2023 04:08 )  x     / x     / 44 U/L / x     / <1.0 ng/mL         EC Lead ECG:   Ventricular Rate 88 BPM    Atrial Rate 88 BPM    P-R Interval 182 ms    QRS Duration 108 ms    Q-T Interval 382 ms    QTC Calculation(Bazett) 462 ms    P Axis 55 degrees    R Axis 32 degrees    T Axis 87 degrees    Diagnosis Line Normal sinus rhythm  Inferior infarct (cited on or before 2023)  Anterior infarct (cited on or before 2023)  Nonspecific ST abnormality  Abnormal ECG  Confirmed by tabatha Schultz ([id number]) on 2023 1:00:03 PM (23 @ 03:29)      ACC: [id number] EXAM:  ECHO TTE WO CON COMP W DOPP                          PROCEDURE DATE:  2023          INTERPRETATION:  INDICATION: Chest pain  Sonographer PH    Blood Pressure 136/72    Height 170 cm     Weight 75 kg    Dimensions:  LA 3.8      Normal Values: 2.0 - 4.0 cm  Ao 3.2        Normal Values: 2.0 - 3.8 cm  SEPTUM 0.9       Normal Values: 0.6 - 1.2 cm  PWT 0.8       Normal Values: 0.6 - 1.1 cm  LVIDd 4.0         Normal Values: 3.0 - 5.6 cm  LVIDs 2.7         Normal Values: 1.8- 4.0 cm      OBSERVATIONS:  Mitral Valve: Mitral annular calcification with thickened leaflets. Mild   MR.  Aortic Valve/Aorta: Calcified trileaflet aortic valve with grossly normal   opening.  Tricuspid Valve: normal with trace TR.  Pulmonic Valve:Not well-visualized  Left Atrium: normal  Right Atrium: Not well-visualized  Left Ventricle: normal LV size and systolic function, estimated LVEF of   55-60%. There is hypokinesis of the mid to basal inferior wall  Right Ventricle: Grossly normal size and systolic function.  Pericardium: no significant pericardial effusion.        IMPRESSION:  normal LV size and systolic function, estimated LVEF of 55-60%. There is   hypokinesis of the mid to basal inferior wall  Grossly normal RV size and systolic function.  Calcified trileaflet aortic valve with grossly normal opening, without AI.  Mild MR  Trace TR.  No significant pericardial effusion.    --- End of Report ---            KIMBERLY GODFREY MD; Attending Cardiologist  This document has been electronically signed. 2023 11:06AM      Radiology:

## 2023-01-08 NOTE — PROGRESS NOTE ADULT - ASSESSMENT
78 y/o M w/ PMH of CAD, HTN, HLD p/w chest pain, now being managed for ACS, anemia workup, and imaging concerning for renal mass w/ metastasis. Pending bone scan on 1/9 prior to bone biopsy.

## 2023-01-08 NOTE — PROVIDER CONTACT NOTE (OTHER) - ACTION/TREATMENT ORDERED:
& Leonardo ordered for morning, continued cardiac monitoring.
Continued cardiac monitoring.
Pending

## 2023-01-08 NOTE — PROVIDER CONTACT NOTE (OTHER) - BACKGROUND
VS documented coincidentally around that time noted as stable. VS rechecked after technician notified RN - also WNL and documented on flow sheet. Dr. Schultz notified
Admitted with NSTEMI, multiple metastatic issues
Pt admitted for CP & SOB

## 2023-01-08 NOTE — PROGRESS NOTE ADULT - SUBJECTIVE AND OBJECTIVE BOX
Patient is a 77y old  Male who presents with a chief complaint of chest pain, r/o ACS (08 Jan 2023 12:08)      INTERVAL HPI/OVERNIGHT EVENTS: Pt had rectal temp 100.5F overnight. New fever workup ordered. CXR unchanged from previous, no leukocytosis, cultures pending. Pt seen and examined at bedside. Admits his gown was soaked and had to be changed 3 times throughout the night and reports this has been an ongoing issue for the past month. Denies fever/chills, chest pain, shortness of breath, headache, dizziness, nausea/vomiting, abdominal pain, hematuria, dysuria. Pt had a 3.6 sec pause noted on tele monitor this AM. Cardio following, no acute interventions necessary.    MEDICATIONS  (STANDING):  amLODIPine   Tablet 10 milliGRAM(s) Oral daily  aspirin  chewable 81 milliGRAM(s) Oral daily  dextrose 5% + sodium chloride 0.9%. 1000 milliLiter(s) (50 mL/Hr) IV Continuous <Continuous>  ezetimibe 10 milliGRAM(s) Oral daily  influenza  Vaccine (HIGH DOSE) 0.7 milliLiter(s) IntraMuscular once  isosorbide   mononitrate ER Tablet (IMDUR) 30 milliGRAM(s) Oral daily  lisinopril 20 milliGRAM(s) Oral daily  metoprolol succinate  milliGRAM(s) Oral daily  predniSONE   Tablet 40 milliGRAM(s) Oral at bedtime    MEDICATIONS  (PRN):  LORazepam   Injectable 0.25 milliGRAM(s) IV Push once PRN Anxiety      Allergies    No Known Allergies    Intolerances        REVIEW OF SYSTEMS:  CONSTITUTIONAL: Admits to night sweats, although denies fever or chills  HEENT:  No headache, no sore throat  RESPIRATORY: No cough, wheezing, or shortness of breath  CARDIOVASCULAR: No chest pain, palpitations  GASTROINTESTINAL: No abd pain, nausea, vomiting, or diarrhea  GENITOURINARY: No dysuria, frequency, or hematuria  NEUROLOGICAL: no focal weakness or dizziness  MUSCULOSKELETAL: no myalgias     Vital Signs Last 24 Hrs  T(C): 36.7 (08 Jan 2023 12:40), Max: 38.1 (08 Jan 2023 01:20)  T(F): 98 (08 Jan 2023 12:40), Max: 100.5 (08 Jan 2023 01:20)  HR: 77 (08 Jan 2023 12:40) (77 - 83)  BP: 106/63 (08 Jan 2023 12:40) (104/52 - 114/77)  BP(mean): --  RR: 18 (08 Jan 2023 12:40) (16 - 18)  SpO2: 94% (08 Jan 2023 12:40) (93% - 98%)    Parameters below as of 08 Jan 2023 12:40  Patient On (Oxygen Delivery Method): room air        PHYSICAL EXAM:  GENERAL: NAD  HEENT:  anicteric, moist mucous membranes  CHEST/LUNG:  CTA b/l, no rales, wheezes, or rhonchi  HEART:  RRR, S1, S2  ABDOMEN:  BS+, soft, nontender, nondistended  EXTREMITIES: no edema, cyanosis, or calf tenderness  NERVOUS SYSTEM: answers questions and follows commands appropriately    LABS:                        9.0    7.41  )-----------( 401      ( 08 Jan 2023 08:02 )             30.9     CBC Full  -  ( 08 Jan 2023 08:02 )  WBC Count : 7.41 K/uL  Hemoglobin : 9.0 g/dL  Hematocrit : 30.9 %  Platelet Count - Automated : 401 K/uL  Mean Cell Volume : 84.2 fl  Mean Cell Hemoglobin : 24.5 pg  Mean Cell Hemoglobin Concentration : 29.1 gm/dL  Auto Neutrophil # : 6.48 K/uL  Auto Lymphocyte # : 0.69 K/uL  Auto Monocyte # : 0.17 K/uL  Auto Eosinophil # : 0.01 K/uL  Auto Basophil # : 0.01 K/uL  Auto Neutrophil % : 87.5 %  Auto Lymphocyte % : 9.3 %  Auto Monocyte % : 2.3 %  Auto Eosinophil % : 0.1 %  Auto Basophil % : 0.1 %    08 Jan 2023 08:02    139    |  105    |  28     ----------------------------<  154    4.6     |  25     |  1.20     Ca    10.0       08 Jan 2023 08:02  Phos  4.4       08 Jan 2023 08:02  Mg     2.6       08 Jan 2023 08:02    TPro  8.7    /  Alb  1.9    /  TBili  0.2    /  DBili  x      /  AST  77     /  ALT  110    /  AlkPhos  129    08 Jan 2023 08:02    PTT - ( 07 Jan 2023 04:08 )  PTT:48.8 sec    CAPILLARY BLOOD GLUCOSE              RADIOLOGY & ADDITIONAL TESTS:    Personally reviewed.     Consultant(s) Notes Reviewed:  [x] YES  [ ] NO     Patient is a 77y old  Male who presents with a chief complaint of chest pain, r/o ACS (08 Jan 2023 12:08)      INTERVAL HPI/OVERNIGHT EVENTS: Pt had temp 100.5F overnight. New fever workup ordered. CXR unchanged from previous, no leukocytosis, cultures pending. Pt seen and examined at bedside. Admits his gown was soaked and had to be changed 3 times throughout the night and reports this has been an ongoing issue for the past month. Denies fever/chills, chest pain, shortness of breath, headache, dizziness, nausea/vomiting, abdominal pain, hematuria, dysuria. Pt had a 3.6 sec pause noted on tele monitor this AM. Cardio following, no acute interventions necessary.    MEDICATIONS  (STANDING):  amLODIPine   Tablet 10 milliGRAM(s) Oral daily  aspirin  chewable 81 milliGRAM(s) Oral daily  dextrose 5% + sodium chloride 0.9%. 1000 milliLiter(s) (50 mL/Hr) IV Continuous <Continuous>  ezetimibe 10 milliGRAM(s) Oral daily  influenza  Vaccine (HIGH DOSE) 0.7 milliLiter(s) IntraMuscular once  isosorbide   mononitrate ER Tablet (IMDUR) 30 milliGRAM(s) Oral daily  lisinopril 20 milliGRAM(s) Oral daily  metoprolol succinate  milliGRAM(s) Oral daily  predniSONE   Tablet 40 milliGRAM(s) Oral at bedtime    MEDICATIONS  (PRN):  LORazepam   Injectable 0.25 milliGRAM(s) IV Push once PRN Anxiety      Allergies    No Known Allergies    Intolerances        REVIEW OF SYSTEMS:  CONSTITUTIONAL: Admits to night sweats, although denies fever or chills  HEENT:  No headache, no sore throat  RESPIRATORY: No cough, wheezing, or shortness of breath  CARDIOVASCULAR: No chest pain, palpitations  GASTROINTESTINAL: No abd pain, nausea, vomiting, or diarrhea  GENITOURINARY: No dysuria, frequency, or hematuria  NEUROLOGICAL: no focal weakness or dizziness  MUSCULOSKELETAL: no myalgias     Vital Signs Last 24 Hrs  T(C): 36.7 (08 Jan 2023 12:40), Max: 38.1 (08 Jan 2023 01:20)  T(F): 98 (08 Jan 2023 12:40), Max: 100.5 (08 Jan 2023 01:20)  HR: 77 (08 Jan 2023 12:40) (77 - 83)  BP: 106/63 (08 Jan 2023 12:40) (104/52 - 114/77)  BP(mean): --  RR: 18 (08 Jan 2023 12:40) (16 - 18)  SpO2: 94% (08 Jan 2023 12:40) (93% - 98%)    Parameters below as of 08 Jan 2023 12:40  Patient On (Oxygen Delivery Method): room air        PHYSICAL EXAM:  GENERAL: NAD  HEENT:  anicteric, moist mucous membranes  CHEST/LUNG:  CTA b/l, no rales, wheezes, or rhonchi  HEART:  RRR, S1, S2  ABDOMEN:  BS+, soft, nontender, nondistended  EXTREMITIES: no edema, cyanosis, or calf tenderness  NERVOUS SYSTEM: answers questions and follows commands appropriately    LABS:                        9.0    7.41  )-----------( 401      ( 08 Jan 2023 08:02 )             30.9     CBC Full  -  ( 08 Jan 2023 08:02 )  WBC Count : 7.41 K/uL  Hemoglobin : 9.0 g/dL  Hematocrit : 30.9 %  Platelet Count - Automated : 401 K/uL  Mean Cell Volume : 84.2 fl  Mean Cell Hemoglobin : 24.5 pg  Mean Cell Hemoglobin Concentration : 29.1 gm/dL  Auto Neutrophil # : 6.48 K/uL  Auto Lymphocyte # : 0.69 K/uL  Auto Monocyte # : 0.17 K/uL  Auto Eosinophil # : 0.01 K/uL  Auto Basophil # : 0.01 K/uL  Auto Neutrophil % : 87.5 %  Auto Lymphocyte % : 9.3 %  Auto Monocyte % : 2.3 %  Auto Eosinophil % : 0.1 %  Auto Basophil % : 0.1 %    08 Jan 2023 08:02    139    |  105    |  28     ----------------------------<  154    4.6     |  25     |  1.20     Ca    10.0       08 Jan 2023 08:02  Phos  4.4       08 Jan 2023 08:02  Mg     2.6       08 Jan 2023 08:02    TPro  8.7    /  Alb  1.9    /  TBili  0.2    /  DBili  x      /  AST  77     /  ALT  110    /  AlkPhos  129    08 Jan 2023 08:02    PTT - ( 07 Jan 2023 04:08 )  PTT:48.8 sec    CAPILLARY BLOOD GLUCOSE              RADIOLOGY & ADDITIONAL TESTS:    Personally reviewed.     Consultant(s) Notes Reviewed:  [x] YES  [ ] NO

## 2023-01-08 NOTE — PROGRESS NOTE ADULT - PROBLEM SELECTOR PLAN 1
Chest pain likely ACS event, although unlikely significant infarct per Cardio  - ACS treatment initiated:     - Heparin gtt 48hrs completed    - Continue ASA, Imdur, bb    - Hold Plavix for pending bone biopsy    - Hold home atorvastatin in the setting of transaminitis  - CT chest/abd/pelvis - Left ventricular aneurysm  - Echo: EF 55-60%  - Cardiology ([facility]) following, recs appreciated

## 2023-01-08 NOTE — PROGRESS NOTE ADULT - ASSESSMENT
77 year old male with history of CABG in 1995, S/P stent x1 placement in 2008, HTN, hyperlipidemia, presented to the ED with the c/o sudden onset of left-sided chest pain admitted for new LBBB and rising trops     Elevated Trops, HTN   -p/w chest pain, +elevated hTrops, peaked and downtrending.  - Patient is not complaining of any cardiac symptoms at this time  - CPK, CKMB, flat , likely demand in the setting of anemia   - EKG shows new LBBB when compared to last EKG down as out patient on 12/24/22,  repeat EKG, NSR   -tele with SR with LBBB     - 2decho as above normal lv 55-60% hypokinesis of mid to basal inferior wall mild mr, trace tr   - sp hep gtt x 48 hours   - not optimal candidate for LHC would opt for medical management  at this time given hem onc w/u  - continue to monitor for anginal complaints   - continue ASA, statin,   -- CT C/A/P - Left ventricular aneurysm, Lrenal neoplasm, , followed by hem onc   -dw hem onc team plan is to hold plavix x 2 days then plan for biopsy of bone lesion then dc and plan for outpatient diagnostic cath   - BP well controlled, monitor routine hemodynamics 114/77  -ID siegel for fever overnight   - continue CCB, BB, ARB with hold parameters       - Monitor and replete Lytes. Keep K > 4 and Mg > 2  - Will continue to follow.    Ingrid Shepard FNP-C  Cardiology NP  [id number] [phone]     77 year old male with history of CABG in 1995, S/P stent x1 placement in 2008, HTN, hyperlipidemia, presented to the ED with the c/o sudden onset of left-sided chest pain admitted for new LBBB and rising trops     Elevated Trops, HTN   -p/w chest pain, +elevated hTrops, peaked and downtrending.  - Patient is not complaining of any cardiac symptoms at this time  -tele sr with LBBB 3 second pause this am   - CPK, CKMB, flat , likely demand in the setting of anemia   - EKG shows new LBBB when compared to last EKG down as out patient on 12/24/22,  repeat EKG, NSR   - 2decho as above normal lv 55-60% hypokinesis of mid to basal inferior wall mild mr, trace tr   - sp hep gtt x 48 hours   - continue to monitor for anginal complaints   - continue ASA, statin,   -- CT C/A/P - Left ventricular aneurysm, Lrenal neoplasm, , followed by hem onc   -dw hem onc team plan is to hold plavix x 2 days then plan for biopsy tomorrow 1/9  of bone lesion then dc and plan for outpatient diagnostic cath   - BP well controlled, monitor routine hemodynamics 114/77  -ID siegel for fever overnight   - continue CCB, BB, ARB with hold parameters     - Monitor and replete Lytes. Keep K > 4 and Mg > 2  - Will continue to follow.    Ingrid Shepard FNP-C  Cardiology NP  [id number] [phone]

## 2023-01-08 NOTE — PROVIDER CONTACT NOTE (OTHER) - ASSESSMENT
No cardio / respiratory distress noted. Patient denies pain, denies chest pain. Vital signs stable and documented on flow sheet immediately after occurrence.

## 2023-01-08 NOTE — PROGRESS NOTE ADULT - ASSESSMENT
anemia  chest pain  abnormal CT     reg diet  CT scan noted  awaiting bone scan  elevated lfts; liver normal on CT   cbc daily    I reviewed the overnight course of events on the unit, re-confirming the patient history. I discussed the care with the patient and their family  The plan of care was discussed with the physician assistant and modifications were made to the notation where appropriate.   Differential diagnosis and plan of care discussed with patient after the evaluation  35 minutes spent on total encounter of which more than fifty percent of the encounter was spent counseling and/or coordinating care by the attending physician.  Advanced care planning was discussed with patient and family.  Advanced care planning forms were reviewed and discussed.  Risks, benefits and alternatives of gastroenterologic procedures were discussed in detail and all questions were answered.

## 2023-01-08 NOTE — PROVIDER CONTACT NOTE (OTHER) - SITUATION
Patient had 14 beats of V tach at 1629
Klout tech notified RN at 0901 of cardiac pause with 3.609 seconds length.
Pt with wide complex QRS sustained for 5 min according to tele tech.

## 2023-01-08 NOTE — PROGRESS NOTE ADULT - PROBLEM SELECTOR PLAN 3
- Likely anemia of chronic disease related to an underlying renal malignancy  - Recent hx of anemia being worked up outpt  - Pt reported UA as outpt showed proteuria and hematuria, had GI and Uro appts scheduled for next week  - Heme/onc following, recs appreciated

## 2023-01-08 NOTE — PROGRESS NOTE ADULT - SUBJECTIVE AND OBJECTIVE BOX
Interval History:  feeling better    Chart reviewed and events noted;   Overnight events:    MEDICATIONS  (STANDING):  amLODIPine   Tablet 10 milliGRAM(s) Oral daily  aspirin  chewable 81 milliGRAM(s) Oral daily  dextrose 5% + sodium chloride 0.9%. 1000 milliLiter(s) (50 mL/Hr) IV Continuous <Continuous>  ezetimibe 10 milliGRAM(s) Oral daily  influenza  Vaccine (HIGH DOSE) 0.7 milliLiter(s) IntraMuscular once  isosorbide   mononitrate ER Tablet (IMDUR) 30 milliGRAM(s) Oral daily  lisinopril 20 milliGRAM(s) Oral daily  metoprolol succinate  milliGRAM(s) Oral daily  predniSONE   Tablet 40 milliGRAM(s) Oral at bedtime    MEDICATIONS  (PRN):  LORazepam   Injectable 0.25 milliGRAM(s) IV Push once PRN Anxiety      Vital Signs Last 24 Hrs  T(C): 36.6 (08 Jan 2023 09:05), Max: 38.1 (08 Jan 2023 01:20)  T(F): 97.8 (08 Jan 2023 09:05), Max: 100.5 (08 Jan 2023 01:20)  HR: 80 (08 Jan 2023 09:05) (77 - 83)  BP: 108/61 (08 Jan 2023 09:05) (104/52 - 114/77)  BP(mean): --  RR: 17 (08 Jan 2023 09:05) (16 - 18)  SpO2: 96% (08 Jan 2023 09:05) (93% - 98%)    Parameters below as of 08 Jan 2023 09:05  Patient On (Oxygen Delivery Method): room air        PHYSICAL EXAM  General: adult in NAD  HEENT: clear oropharynx, anicteric sclera, pink conjunctivae  Neck: supple  CV: normal S1S2 with no murmur rubs or gallops  Lungs: clear to auscultation, no wheezes, no rhales  Abdomen: soft non-tender non-distended, no hepato/splenomegaly  Ext: no clubbing cyanosis or edema  Skin: no rashes and no petichiae  Neuro: alert and oriented X3 no focal deficits      LABS:  CBC Full  -  ( 08 Jan 2023 08:02 )  WBC Count : 7.41 K/uL  RBC Count : 3.67 M/uL  Hemoglobin : 9.0 g/dL  Hematocrit : 30.9 %  Platelet Count - Automated : 401 K/uL  Mean Cell Volume : 84.2 fl  Mean Cell Hemoglobin : 24.5 pg  Mean Cell Hemoglobin Concentration : 29.1 gm/dL  Auto Neutrophil # : 6.48 K/uL  Auto Lymphocyte # : 0.69 K/uL  Auto Monocyte # : 0.17 K/uL  Auto Eosinophil # : 0.01 K/uL  Auto Basophil # : 0.01 K/uL  Auto Neutrophil % : 87.5 %  Auto Lymphocyte % : 9.3 %  Auto Monocyte % : 2.3 %  Auto Eosinophil % : 0.1 %  Auto Basophil % : 0.1 %    01-08    139  |  105  |  28<H>  ----------------------------<  154<H>  4.6   |  25  |  1.20    Ca    10.0      08 Jan 2023 08:02  Phos  4.4     01-08  Mg     2.6     01-08    TPro  8.7<H>  /  Alb  1.9<L>  /  TBili  0.2  /  DBili  x   /  AST  77<H>  /  ALT  110<H>  /  AlkPhos  129<H>  01-08    PTT - ( 07 Jan 2023 04:08 )  PTT:48.8 sec    fe studies  Iron - Total Binding Capacity.: 251 ug/dL (01-05 @ 09:35)  Iron - Total Binding Capacity.: 264 ug/dL (01-05 @ 06:45)  Ferritin, Serum: 860 ng/mL (01-05 @ 06:45)  Ferritin, Serum: 889 ng/mL (01-04 @ 19:16)      WBC trend  7.41 K/uL (01-08-23 @ 08:02)  9.48 K/uL (01-08-23 @ 02:50)  8.41 K/uL (01-07-23 @ 04:08)  8.06 K/uL (01-06-23 @ 08:46)      Hgb trend  9.0 g/dL (01-08-23 @ 08:02)  8.4 g/dL (01-08-23 @ 02:50)  8.1 g/dL (01-07-23 @ 14:08)  8.1 g/dL (01-07-23 @ 04:08)  8.9 g/dL (01-06-23 @ 08:46)      plt trend  401 K/uL (01-08-23 @ 08:02)  493 K/uL (01-08-23 @ 02:50)  447 K/uL (01-07-23 @ 04:08)  492 K/uL (01-06-23 @ 08:46)        RADIOLOGY & ADDITIONAL STUDIES:

## 2023-01-08 NOTE — PROGRESS NOTE ADULT - SUBJECTIVE AND OBJECTIVE BOX
[facility]  Vivek Cole PA-C  [address] [place] [address]  [phone]      INTERVAL HPI/OVERNIGHT EVENTS:  Pt s/e  Reports no overt GI bleeding    MEDICATIONS  (STANDING):  amLODIPine   Tablet 10 milliGRAM(s) Oral daily  aspirin  chewable 81 milliGRAM(s) Oral daily  clopidogrel Tablet 75 milliGRAM(s) Oral daily  ezetimibe 10 milliGRAM(s) Oral daily  heparin  Infusion.  Unit(s)/Hr (9 mL/Hr) IV Continuous <Continuous>  influenza  Vaccine (HIGH DOSE) 0.7 milliLiter(s) IntraMuscular once  isosorbide   mononitrate ER Tablet (IMDUR) 30 milliGRAM(s) Oral daily  lisinopril 20 milliGRAM(s) Oral daily  predniSONE   Tablet 10 milliGRAM(s) Oral at bedtime    MEDICATIONS  (PRN):  heparin   Injectable 4400 Unit(s) IV Push every 6 hours PRN For aPTT less than 40  LORazepam   Injectable 0.25 milliGRAM(s) IV Push once PRN Anxiety      Allergies    No Known Allergies      PHYSICAL EXAM:   Vital Signs:  Vital Signs Last 24 Hrs  T(C): 36.8 (2023 04:47), Max: 37.3 (2023 23:46)  T(F): 98.3 (2023 04:47), Max: 99.1 (2023 23:46)  HR: 85 (2023 11:50) (85 - 99)  BP: 132/76 (2023 11:50) (108/64 - 134/76)  BP(mean): 77 (2023 15:20) (77 - 77)  RR: 18 (2023 04:47) (18 - 18)  SpO2: 96% (2023 04:47) (96% - 97%)    Parameters below as of 2023 04:47  Patient On (Oxygen Delivery Method): room air      Daily     Daily Weight in k.6 (2023 04:47)    GENERAL:  Appears stated age  ABDOMEN:  Soft, non-tender, non-distended  NEURO:  Alert      LABS:                        8.9    8.06  )-----------( 492      ( 2023 08:46 )             29.4     -    139  |  104  |  20  ----------------------------<  132<H>  4.1   |  26  |  1.10    Ca    10.2<H>      2023 08:46  Phos  4.4       Mg     2.6         TPro  8.7<H>  /  Alb  2.0<L>  /  TBili  0.3  /  DBili  x   /  AST  80<H>  /  ALT  108<H>  /  AlkPhos  140<H>      PT/INR - ( 2023 19:16 )   PT: 14.7 sec;   INR: 1.25 ratio         PTT - ( 2023 01:45 )  PTT:68.2 sec  Urinalysis Basic - ( 2023 18:18 )    Color: Yellow / Appearance: Clear / S.010 / pH: x  Gluc: x / Ketone: Negative  / Bili: Negative / Urobili: Negative   Blood: x / Protein: 100 / Nitrite: Negative   Leuk Esterase: Negative / RBC: 0-2 /HPF / WBC 3-5   Sq Epi: x / Non Sq Epi: Occasional / Bacteria: x

## 2023-01-09 ENCOUNTER — TRANSCRIPTION ENCOUNTER (OUTPATIENT)
Age: 78
End: 2023-01-09

## 2023-01-09 ENCOUNTER — RESULT REVIEW (OUTPATIENT)
Age: 78
End: 2023-01-09

## 2023-01-09 ENCOUNTER — OUTPATIENT (OUTPATIENT)
Dept: OUTPATIENT SERVICES | Facility: HOSPITAL | Age: 78
LOS: 1 days | End: 2023-01-09
Payer: COMMERCIAL

## 2023-01-09 DIAGNOSIS — Z95.5 PRESENCE OF CORONARY ANGIOPLASTY IMPLANT AND GRAFT: Chronic | ICD-10-CM

## 2023-01-09 DIAGNOSIS — C80.1 MALIGNANT (PRIMARY) NEOPLASM, UNSPECIFIED: ICD-10-CM

## 2023-01-09 DIAGNOSIS — Z95.1 PRESENCE OF AORTOCORONARY BYPASS GRAFT: Chronic | ICD-10-CM

## 2023-01-09 DIAGNOSIS — Z00.00 ENCOUNTER FOR GENERAL ADULT MEDICAL EXAMINATION WITHOUT ABNORMAL FINDINGS: ICD-10-CM

## 2023-01-09 LAB
ALBUMIN SERPL ELPH-MCNC: 1.8 G/DL — LOW (ref 3.3–5)
ALP SERPL-CCNC: 116 U/L — SIGNIFICANT CHANGE UP (ref 40–120)
ALT FLD-CCNC: 158 U/L — HIGH (ref 12–78)
ANION GAP SERPL CALC-SCNC: 8 MMOL/L — SIGNIFICANT CHANGE UP (ref 5–17)
AST SERPL-CCNC: 123 U/L — HIGH (ref 15–37)
BASOPHILS # BLD AUTO: 0.01 K/UL — SIGNIFICANT CHANGE UP (ref 0–0.2)
BASOPHILS NFR BLD AUTO: 0.1 % — SIGNIFICANT CHANGE UP (ref 0–2)
BILIRUB SERPL-MCNC: 0.2 MG/DL — SIGNIFICANT CHANGE UP (ref 0.2–1.2)
BUN SERPL-MCNC: 27 MG/DL — HIGH (ref 7–23)
CALCIUM SERPL-MCNC: 9.4 MG/DL — SIGNIFICANT CHANGE UP (ref 8.5–10.1)
CHLORIDE SERPL-SCNC: 106 MMOL/L — SIGNIFICANT CHANGE UP (ref 96–108)
CO2 SERPL-SCNC: 25 MMOL/L — SIGNIFICANT CHANGE UP (ref 22–31)
CREAT SERPL-MCNC: 1.1 MG/DL — SIGNIFICANT CHANGE UP (ref 0.5–1.3)
CULTURE RESULTS: SIGNIFICANT CHANGE UP
EGFR: 70 ML/MIN/1.73M2 — SIGNIFICANT CHANGE UP
EOSINOPHIL # BLD AUTO: 0.01 K/UL — SIGNIFICANT CHANGE UP (ref 0–0.5)
EOSINOPHIL NFR BLD AUTO: 0.1 % — SIGNIFICANT CHANGE UP (ref 0–6)
GLUCOSE SERPL-MCNC: 160 MG/DL — HIGH (ref 70–99)
HCT VFR BLD CALC: 26.9 % — LOW (ref 39–50)
HGB BLD-MCNC: 8 G/DL — LOW (ref 13–17)
HGB BLD-MCNC: 8.3 G/DL — LOW (ref 13–17)
IMM GRANULOCYTES NFR BLD AUTO: 0.4 % — SIGNIFICANT CHANGE UP (ref 0–0.9)
LYMPHOCYTES # BLD AUTO: 0.63 K/UL — LOW (ref 1–3.3)
LYMPHOCYTES # BLD AUTO: 7.1 % — LOW (ref 13–44)
MAGNESIUM SERPL-MCNC: 2.4 MG/DL — SIGNIFICANT CHANGE UP (ref 1.6–2.6)
MCHC RBC-ENTMCNC: 24.1 PG — LOW (ref 27–34)
MCHC RBC-ENTMCNC: 29.7 GM/DL — LOW (ref 32–36)
MCV RBC AUTO: 81 FL — SIGNIFICANT CHANGE UP (ref 80–100)
MONOCYTES # BLD AUTO: 0.26 K/UL — SIGNIFICANT CHANGE UP (ref 0–0.9)
MONOCYTES NFR BLD AUTO: 2.9 % — SIGNIFICANT CHANGE UP (ref 2–14)
NEUTROPHILS # BLD AUTO: 7.95 K/UL — HIGH (ref 1.8–7.4)
NEUTROPHILS NFR BLD AUTO: 89.4 % — HIGH (ref 43–77)
NRBC # BLD: 0 /100 WBCS — SIGNIFICANT CHANGE UP (ref 0–0)
PHOSPHATE SERPL-MCNC: 3.6 MG/DL — SIGNIFICANT CHANGE UP (ref 2.5–4.5)
PLATELET # BLD AUTO: 509 K/UL — HIGH (ref 150–400)
POTASSIUM SERPL-MCNC: 4.5 MMOL/L — SIGNIFICANT CHANGE UP (ref 3.5–5.3)
POTASSIUM SERPL-SCNC: 4.5 MMOL/L — SIGNIFICANT CHANGE UP (ref 3.5–5.3)
PROT SERPL-MCNC: 8 G/DL — SIGNIFICANT CHANGE UP (ref 6–8.3)
RBC # BLD: 3.32 M/UL — LOW (ref 4.2–5.8)
RBC # FLD: 17.6 % — HIGH (ref 10.3–14.5)
SODIUM SERPL-SCNC: 139 MMOL/L — SIGNIFICANT CHANGE UP (ref 135–145)
SPECIMEN SOURCE: SIGNIFICANT CHANGE UP
WBC # BLD: 8.9 K/UL — SIGNIFICANT CHANGE UP (ref 3.8–10.5)
WBC # FLD AUTO: 8.9 K/UL — SIGNIFICANT CHANGE UP (ref 3.8–10.5)

## 2023-01-09 PROCEDURE — 78306 BONE IMAGING WHOLE BODY: CPT

## 2023-01-09 PROCEDURE — 99233 SBSQ HOSP IP/OBS HIGH 50: CPT | Mod: GC

## 2023-01-09 PROCEDURE — 99232 SBSQ HOSP IP/OBS MODERATE 35: CPT

## 2023-01-09 PROCEDURE — 78803 RP LOCLZJ TUM SPECT 1 AREA: CPT | Mod: MA

## 2023-01-09 PROCEDURE — A9561: CPT

## 2023-01-09 PROCEDURE — 72158 MRI LUMBAR SPINE W/O & W/DYE: CPT | Mod: 26

## 2023-01-09 PROCEDURE — 78803 RP LOCLZJ TUM SPECT 1 AREA: CPT | Mod: 26,MA

## 2023-01-09 PROCEDURE — 78306 BONE IMAGING WHOLE BODY: CPT | Mod: 26,MH

## 2023-01-09 RX ORDER — ATORVASTATIN CALCIUM 80 MG/1
1 TABLET, FILM COATED ORAL
Qty: 0 | Refills: 0 | DISCHARGE
Start: 2023-01-09

## 2023-01-09 RX ORDER — EZETIMIBE 10 MG/1
1 TABLET ORAL
Qty: 0 | Refills: 0 | DISCHARGE

## 2023-01-09 RX ORDER — CLOPIDOGREL BISULFATE 75 MG/1
1 TABLET, FILM COATED ORAL
Qty: 30 | Refills: 0
Start: 2023-01-09 | End: 2023-02-07

## 2023-01-09 RX ORDER — EZETIMIBE 10 MG/1
1 TABLET ORAL
Qty: 0 | Refills: 0 | DISCHARGE
Start: 2023-01-09

## 2023-01-09 RX ORDER — CLOPIDOGREL BISULFATE 75 MG/1
75 TABLET, FILM COATED ORAL DAILY
Refills: 0 | Status: DISCONTINUED | OUTPATIENT
Start: 2023-01-09 | End: 2023-01-09

## 2023-01-09 RX ORDER — CLOPIDOGREL BISULFATE 75 MG/1
75 TABLET, FILM COATED ORAL DAILY
Refills: 0 | Status: DISCONTINUED | OUTPATIENT
Start: 2023-01-10 | End: 2023-01-10

## 2023-01-09 RX ADMIN — EZETIMIBE 10 MILLIGRAM(S): 10 TABLET ORAL at 17:36

## 2023-01-09 RX ADMIN — Medication 0.25 MILLIGRAM(S): at 18:12

## 2023-01-09 RX ADMIN — Medication 200 MILLIGRAM(S): at 05:30

## 2023-01-09 RX ADMIN — LISINOPRIL 20 MILLIGRAM(S): 2.5 TABLET ORAL at 05:30

## 2023-01-09 RX ADMIN — Medication 81 MILLIGRAM(S): at 17:36

## 2023-01-09 RX ADMIN — ISOSORBIDE MONONITRATE 30 MILLIGRAM(S): 60 TABLET, EXTENDED RELEASE ORAL at 17:36

## 2023-01-09 RX ADMIN — SODIUM CHLORIDE 50 MILLILITER(S): 9 INJECTION, SOLUTION INTRAVENOUS at 00:00

## 2023-01-09 RX ADMIN — AMLODIPINE BESYLATE 10 MILLIGRAM(S): 2.5 TABLET ORAL at 06:18

## 2023-01-09 NOTE — PROGRESS NOTE ADULT - ASSESSMENT
77 year old male with history of CABG in 1995, S/P stent x1 placement in 2008, HTN, hyperlipidemia, presented to the ED with the c/o sudden onset of left-sided chest pain admitted for new LBBB and rising trops     Hx CAD s/p CABG and stent, Elevated Trops, HTN   - P/W chest pain, +elevated hTrops, and LBBB.  hsT peaked and downtrending.  No need to trend  - sp hep gtt x 48 hours.  Continue ASA, ACEI, Imdur, and statin.  Holding Plavix in anticipation of Bone Bx.  Would resume post procedure  - No anginal complaints.  - Has had 15 beats NSVT and a 3.6 sec pause, 1/8.  No further noted  - Continue current dose of BB for now    - EKG shows new LBBB when compared to last EKG down as out patient on 12/24/22,  repeat EKG, NSR   - TTE: normal LV 55-60% hypokinesis of mid to basal inferior wall mild mr, trace tr     - CT C/A/P - Left ventricular aneurysm, Lt renal neoplasm, followed by hem onc   - Bone Bx today, 1/9, of bone lesion then dc and plan for outpatient diagnostic cath     - BP well controlled, monitor routine hemodynamics    - Monitor and replete Lytes. Keep K > 4 and Mg > 2  - Will continue to follow.    Kathy Doll DNP, NP-C  Cardiology   Spectra #[phone]

## 2023-01-09 NOTE — DISCHARGE NOTE PROVIDER - CARE PROVIDER_API CALL
Ileana Solano)  Vascular Surgery  [address] [place] [address]  Phone: [phone]  Fax: [phone]  Follow Up Time:     Kiko Schultz)  Cardiovascular Disease; Internal Medicine  [address] [id number]  Phone: [phone]  Fax: [phone]  Follow Up Time:     Thang Jay)  Hematology; Internal Medicine; Medical Oncology  [address] [facility], [facility] [address]  [place] [address]  Phone: [phone]  Fax: [phone]  Follow Up Time:    Ileana Solano)  Vascular Surgery  [address] [place] [address]  Phone: [phone]  Fax: [phone]  Follow Up Time:     Kiko Schultz)  Cardiovascular Disease; Internal Medicine  [address] [id number]  Phone: [phone]  Fax: [phone]  Follow Up Time:     Hon SEBASTIAN Hester)  Hematology; Internal Medicine; Medical Oncology  [address] [facility], [address]  [place] [address]  Phone: [phone]  Fax: [phone]  Follow Up Time:

## 2023-01-09 NOTE — PROGRESS NOTE ADULT - SUBJECTIVE AND OBJECTIVE BOX
[facility] -- Corine Smith Patel, Savella, Goodger  Office # [phone]    Follow Up:  Hx CAD s/p CABG and stent, CP, NSTEMI    Subjective/Observations: Awake and alert, comfortable on RA.  Denies any form of respiratory or cardiac discomfort.  No overnight tele events    REVIEW OF SYSTEMS: All other review of systems is negative unless indicated above  PAST MEDICAL & SURGICAL HISTORY:  HTN - Hypertension  H/O hyperlipidemia  CAD, multiple vessel  Renal colic  Hypertension  S/P CABG x 3  1995  S/P coronary artery stent placement  2 stents - 3 years ago  S/P knee surgery  arthroscopic b/l  S/P hernia repair  S/P CABG x 4  S/P coronary artery stent placement    MEDICATIONS  (STANDING):  amLODIPine   Tablet 10 milliGRAM(s) Oral daily  aspirin  chewable 81 milliGRAM(s) Oral daily  dextrose 5% + sodium chloride 0.9%. 1000 milliLiter(s) (50 mL/Hr) IV Continuous <Continuous>  ezetimibe 10 milliGRAM(s) Oral daily  influenza  Vaccine (HIGH DOSE) 0.7 milliLiter(s) IntraMuscular once  isosorbide   mononitrate ER Tablet (IMDUR) 30 milliGRAM(s) Oral daily  lisinopril 20 milliGRAM(s) Oral daily  metoprolol succinate  milliGRAM(s) Oral daily  predniSONE   Tablet 40 milliGRAM(s) Oral at bedtime    MEDICATIONS  (PRN):  LORazepam   Injectable 0.25 milliGRAM(s) IV Push once PRN Anxiety    Allergies    No Known Allergies    Intolerances    Vital Signs Last 24 Hrs  T(C): 36.2 (09 Jan 2023 05:34), Max: 36.7 (08 Jan 2023 12:40)  T(F): 97.1 (09 Jan 2023 05:34), Max: 98 (08 Jan 2023 12:40)  HR: 78 (09 Jan 2023 05:34) (75 - 89)  BP: 108/61 (09 Jan 2023 06:19) (106/63 - 139/79)  BP(mean): --  RR: 18 (09 Jan 2023 05:34) (18 - 19)  SpO2: 98% (09 Jan 2023 05:34) (94% - 98%)    Parameters below as of 09 Jan 2023 05:34  Patient On (Oxygen Delivery Method): room air    I&O's Summary    08 Jan 2023 07:01  -  09 Jan 2023 07:00  --------------------------------------------------------  IN: 520 mL / OUT: 1150 mL / NET: -630 mL    PHYSICAL EXAM:  TELE: NSR  Constitutional: NAD, awake and alert, well-developed  HEENT: Moist Mucous Membranes, Anicteric  Pulmonary: Non-labored, breath sounds are clear bilaterally, No wheezing, rales or rhonchi  Cardiovascular: Regular, S1 and S2, No murmurs, rubs, gallops or clicks  Gastrointestinal: Bowel Sounds present, soft, nontender.   Lymph: No peripheral edema. No lymphadenopathy.  Skin: No visible rashes or ulcers.  Psych:  Mood & affect appropriate  LABS: All Labs Reviewed:                        8.0    8.90  )-----------( 509      ( 09 Jan 2023 06:42 )             26.9                         9.0    7.41  )-----------( 401      ( 08 Jan 2023 08:02 )             30.9                         8.4    9.48  )-----------( 493      ( 08 Jan 2023 02:50 )             27.7     09 Jan 2023 06:42    139    |  106    |  27     ----------------------------<  160    4.5     |  25     |  1.10   08 Jan 2023 08:02    139    |  105    |  28     ----------------------------<  154    4.6     |  25     |  1.20   08 Jan 2023 02:50    137    |  103    |  25     ----------------------------<  177    4.3     |  25     |  1.30     Ca    9.4        09 Jan 2023 06:42  Ca    10.0       08 Jan 2023 08:02  Ca    9.4        08 Jan 2023 02:50  Phos  3.6       09 Jan 2023 06:42  Phos  4.4       08 Jan 2023 08:02  Phos  3.8       07 Jan 2023 04:08  Mg     2.4       09 Jan 2023 06:42  Mg     2.6       08 Jan 2023 08:02  Mg     2.4       07 Jan 2023 04:08    TPro  8.0    /  Alb  1.8    /  TBili  0.2    /  DBili  x      /  AST  123    /  ALT  158    /  AlkPhos  116    09 Jan 2023 06:42  TPro  8.7    /  Alb  1.9    /  TBili  0.2    /  DBili  x      /  AST  77     /  ALT  110    /  AlkPhos  129    08 Jan 2023 08:02  TPro  8.5    /  Alb  2.0    /  TBili  0.2    /  DBili  x      /  AST  90     /  ALT  121    /  AlkPhos  128    08 Jan 2023 02:50      ACC: [id number] EXAM:  ECHO TTE WO CON COMP W DOPP                          PROCEDURE DATE:  01/05/2023          INTERPRETATION:  INDICATION: Chest pain  Sonographer PH    Blood Pressure 136/72    Height 170 cm     Weight 75 kg    Dimensions:  LA 3.8      Normal Values: 2.0 - 4.0 cm  Ao 3.2        Normal Values: 2.0 - 3.8 cm  SEPTUM 0.9       Normal Values: 0.6 - 1.2 cm  PWT 0.8       Normal Values: 0.6 - 1.1 cm  LVIDd 4.0         Normal Values: 3.0 - 5.6 cm  LVIDs 2.7         Normal Values: 1.8- 4.0 cm    OBSERVATIONS:  Mitral Valve: Mitral annular calcification with thickened leaflets. Mild   MR.  Aortic Valve/Aorta: Calcified trileaflet aortic valve with grossly normal   opening.  Tricuspid Valve: normal with trace TR.  Pulmonic Valve:Not well-visualized  Left Atrium: normal  Right Atrium: Not well-visualized  Left Ventricle: normal LV size and systolic function, estimated LVEF of   55-60%. There is hypokinesis of the mid to basal inferior wall  Right Ventricle: Grossly normal size and systolic function.  Pericardium: no significant pericardial effusion.    IMPRESSION:  normal LV size and systolic function, estimated LVEF of 55-60%. There is   hypokinesis of the mid to basal inferior wall  Grossly normal RV size and systolic function.  Calcified trileaflet aortic valve with grossly normal opening, without AI.  Mild MR  Trace TR.  No significant pericardial effusion.    --- End of Report ---    KIMBERLY GODFREY MD; Attending Cardiologist  This document has been electronically signed. Jan 6 2023 11:06AM      ACC: [id number] EXAM:  CT ABDOMEN AND PELVIS IC                        ACC: [id number] EXAM:  CT ANGIO CHEST PULM ART [facility]                          PROCEDURE DATE:  01/04/2023          INTERPRETATION:  CLINICAL INFORMATION: Weakness, chest pain    COMPARISON: Report of prior CT from 2002. Images are not available.    CONTRAST/COMPLICATIONS:  IV Contrast: Omnipaque 350 (accession [id number]), IV contrast documented   in unlinked concurrent exam (accession [id number])  74 cc administered   (accession [id number]), 0 cc administered (accession [id number])   26 cc   discarded (accession [id number]), 0 cc discarded (accession [id number])  Oral Contrast: NONE  Complications: None reported at time of study completion    PROCEDURE:  CT Angiography of the Chest wasperformed followed by portal venous phase   imaging of the Abdomen and Pelvis.  Sagittal and coronal reformats were performed as well as 3D (MIP)   reconstructions.    FINDINGS:    CHEST:  LUNGS AND LARGE AIRWAYS: Singulair as are patent. No large focal   consolidation. Minimal scattered groundglass. Left lower lobe 12 mm   nodule image 65 series 2 adjacent to pulmonary artery branch. This is   concerning for metastatic disease. Correlate with PET/CT. Few additional   peripheral subcentimeter nodules measuring up to 4 mm of the left lower   lobe on image 69 series 2, of unclear significance.  PLEURA: No pleural effusion or pneumothorax  VESSELS: No pulmonary embolus. Main pulmonary artery size is within   normal limits.  HEART: Heart size is mildly enlarged, post CABG. LV apical thinning with   1.7 x 0.6 cm aneurysm versus diverticulum, image 76 series 2. Correlate   with echocardiogram. No pericardial effusion.  MEDIASTINUM AND DARA: Small volume lymph nodes. Esophagus is nondistended.  CHEST WALL AND LOWER NECK: No chest wall hematoma. Visualized thyroid is   suboptimally assessed due to streak artifact.    ABDOMEN AND PELVIS:  LIVER: Liver size within normal limits  BILE DUCTS: No distention  GALLBLADDER: Probable fundal adenomyomatosis  SPLEEN: Spleen size borderline enlarged, 13 cm in craniocaudal dimension  PANCREAS: No acute peripancreatic inflammation  ADRENALS: Unremarkable adrenals  KIDNEYS/URETERS: Left renal large heterogeneous hypervascular mass   concerning for renalcell neoplasm, measuring 11.1 x 9.6 x 12.4 cm with   multiple areas of internal necrosis and numerous parasitic surrounding   vessels. The mass is partially endophytic and mostly exophytic. No   hydronephrosis of the kidneys. Additional left renal cyst. Subcentimeter   hypodensities too small to characterize.    BLADDER: Underdistended  REPRODUCTIVE ORGANS: Prostate is enlarged. Question left varicocele.    BOWEL: Limited evaluation due to lack of oral contrast. Stomach is   underdistended. No small bowel distention. Appendix is unremarkable.   Mild-to-moderate apical limits evaluation of the colonic mucosa.   Left-sided large renal mass abuts the left-sided colon and demonstrates   mass effect on the mesentery.  PERITONEUM: No ascites  VESSELS: No abdominal aortic aneurysm. Aortoiliac atheromatous changes.  RETROPERITONEUM/LYMPH NODES: Small volume nodes.  ABDOMINAL WALL: Right posterior gluteal superficial soft tissue nodule   measures 2.4 cm. A similar finding was reported on prior CT from 2002,   though no images are available for comparison. If this finding is stable   over a long period time, sebaceous cyst is considered.  BONES: Also, 1.5 x 1.4 cm lucent lesion of L4 vertebral body concerning   for metastatic disease. Additional nonspecific heterogeneity of the   pelvis. Recommend bone scan versus MRI for further evaluation. Multilevel   degenerative changes of the bones. Median sternotomy.    IMPRESSION:    No pulmonary embolus or large focal consolidation.    Post CABG. LV apical thinning with 1.7 x 0.6 cm aneurysm versus   diverticulum. Small aortic valve calcification. Correlate with   echocardiogram. Dr. Garnett discussed these findings with Dr. Wilmer Henderson on 1/4/2023 at 11:54AM, with read back.    Left renal large heterogeneous hypervascular mass concerning for renal   cell neoplasm, measuring 11.1 x 9.6 x 12.4 cm. Left lower lobe pulmonary   nodule measuring 1.2 cm adjacent to a pulmonary artery branch, concerning   metastatic disease. PET/CT and oncologyevaluation are recommended for   further evaluation. Dr. Garnett discussed these findings with Dr. Wilmer Henderson on 1/4/2023 at 11:54AM, with read back.    Also, 1.5 x 1.4 cm lucent lesion of L4 vertebral body concerning for   metastatic disease. Additional nonspecific heterogeneity of the pelvis.   Recommend bone scan versus MRI for further evaluation.    --- End of Report ---    DERRICK GARNETT M.D., ATTENDING RADIOLOGIST  This document has been electronically signed. Jan 4 2023 12:09PM    Ventricular Rate 84 BPM    Atrial Rate 84 BPM    P-R Interval 182 ms    QRS Duration 106 ms    Q-T Interval 370 ms    QTC Calculation(Bazett) 437 ms    P Axis 57 degrees    R Axis 34 degrees    T Axis 91 degrees    Diagnosis Line Normal sinus rhythm  Inferior infarct (cited on or before 04-JAN-2023)  Anterior infarct (cited on or before 04-JAN-2023)  Abnormal ECG    Confirmed by tabatha Schultz ([id number]) on 1/8/2023 12:18:58 PM

## 2023-01-09 NOTE — PROGRESS NOTE ADULT - SUBJECTIVE AND OBJECTIVE BOX
CHARTING IN PROGRESS       Patient is a 77y old  Male who presents with a chief complaint of chest pain, r/o ACS (08 Jan 2023 13:04)      INTERVAL HPI/OVERNIGHT EVENTS: No acute events overnight. Patient seen and examined at bedside. Admits to cold sweats throughout the night and states he had to change his gown twice. He insists on going home today after his bone scan. Denies chest pain, shortness of breath, headache, dizziness, nausea/vomiting, abdominal pain.     MEDICATIONS  (STANDING):  amLODIPine   Tablet 10 milliGRAM(s) Oral daily  aspirin  chewable 81 milliGRAM(s) Oral daily  dextrose 5% + sodium chloride 0.9%. 1000 milliLiter(s) (50 mL/Hr) IV Continuous <Continuous>  ezetimibe 10 milliGRAM(s) Oral daily  influenza  Vaccine (HIGH DOSE) 0.7 milliLiter(s) IntraMuscular once  isosorbide   mononitrate ER Tablet (IMDUR) 30 milliGRAM(s) Oral daily  lisinopril 20 milliGRAM(s) Oral daily  metoprolol succinate  milliGRAM(s) Oral daily  predniSONE   Tablet 40 milliGRAM(s) Oral at bedtime    MEDICATIONS  (PRN):  LORazepam   Injectable 0.25 milliGRAM(s) IV Push once PRN Anxiety      Allergies    No Known Allergies    Intolerances        REVIEW OF SYSTEMS:  CONSTITUTIONAL: Admits to night sweats unchanged from baseline  HEENT:  No headache, no sore throat  RESPIRATORY: No cough, wheezing, or shortness of breath  CARDIOVASCULAR: No chest pain, palpitations  GASTROINTESTINAL: No abd pain, nausea, vomiting  GENITOURINARY: No dysuria, frequency, or hematuria  NEUROLOGICAL: no focal weakness or dizziness  MUSCULOSKELETAL: no myalgias     Vital Signs Last 24 Hrs  T(C): 36.2 (09 Jan 2023 05:34), Max: 36.7 (08 Jan 2023 12:40)  T(F): 97.1 (09 Jan 2023 05:34), Max: 98 (08 Jan 2023 12:40)  HR: 78 (09 Jan 2023 05:34) (75 - 89)  BP: 108/61 (09 Jan 2023 06:19) (106/63 - 139/79)  BP(mean): --  RR: 18 (09 Jan 2023 05:34) (18 - 19)  SpO2: 98% (09 Jan 2023 05:34) (94% - 98%)    Parameters below as of 09 Jan 2023 05:34  Patient On (Oxygen Delivery Method): room air        PHYSICAL EXAM:  GENERAL: NAD  HEENT:  anicteric, moist mucous membranes  CHEST/LUNG:  CTA b/l, no rales, wheezes, or rhonchi  HEART:  RRR, S1, S2  ABDOMEN:  BS+, soft, nontender, nondistended  EXTREMITIES: no edema, cyanosis, or calf tenderness  NERVOUS SYSTEM: answers questions and follows commands appropriately    LABS:                        8.0    8.90  )-----------( 509      ( 09 Jan 2023 06:42 )             26.9     CBC Full  -  ( 09 Jan 2023 06:42 )  WBC Count : 8.90 K/uL  Hemoglobin : 8.0 g/dL  Hematocrit : 26.9 %  Platelet Count - Automated : 509 K/uL  Mean Cell Volume : 81.0 fl  Mean Cell Hemoglobin : 24.1 pg  Mean Cell Hemoglobin Concentration : 29.7 gm/dL  Auto Neutrophil # : 7.95 K/uL  Auto Lymphocyte # : 0.63 K/uL  Auto Monocyte # : 0.26 K/uL  Auto Eosinophil # : 0.01 K/uL  Auto Basophil # : 0.01 K/uL  Auto Neutrophil % : 89.4 %  Auto Lymphocyte % : 7.1 %  Auto Monocyte % : 2.9 %  Auto Eosinophil % : 0.1 %  Auto Basophil % : 0.1 %    09 Jan 2023 06:42    139    |  106    |  27     ----------------------------<  160    4.5     |  25     |  1.10     Ca    9.4        09 Jan 2023 06:42  Phos  3.6       09 Jan 2023 06:42  Mg     2.4       09 Jan 2023 06:42    TPro  8.0    /  Alb  1.8    /  TBili  0.2    /  DBili  x      /  AST  123    /  ALT  158    /  AlkPhos  116    09 Jan 2023 06:42        CAPILLARY BLOOD GLUCOSE              RADIOLOGY & ADDITIONAL TESTS:    Personally reviewed.     Consultant(s) Notes Reviewed:  [x] YES  [ ] NO       Patient is a 77y old  Male who presents with a chief complaint of chest pain, r/o ACS (08 Jan 2023 13:04)      INTERVAL HPI/OVERNIGHT EVENTS: No acute events overnight. Patient seen and examined at bedside. Admits to cold sweats throughout the night and states he had to change his gown twice. He insists on going home today after his bone scan. Denies chest pain, shortness of breath, headache, dizziness, nausea/vomiting, abdominal pain.     MEDICATIONS  (STANDING):  amLODIPine   Tablet 10 milliGRAM(s) Oral daily  aspirin  chewable 81 milliGRAM(s) Oral daily  dextrose 5% + sodium chloride 0.9%. 1000 milliLiter(s) (50 mL/Hr) IV Continuous <Continuous>  ezetimibe 10 milliGRAM(s) Oral daily  influenza  Vaccine (HIGH DOSE) 0.7 milliLiter(s) IntraMuscular once  isosorbide   mononitrate ER Tablet (IMDUR) 30 milliGRAM(s) Oral daily  lisinopril 20 milliGRAM(s) Oral daily  metoprolol succinate  milliGRAM(s) Oral daily  predniSONE   Tablet 40 milliGRAM(s) Oral at bedtime    MEDICATIONS  (PRN):  LORazepam   Injectable 0.25 milliGRAM(s) IV Push once PRN Anxiety      Allergies    No Known Allergies    Intolerances        REVIEW OF SYSTEMS:  CONSTITUTIONAL: Admits to night sweats unchanged from baseline  HEENT:  No headache, no sore throat  RESPIRATORY: No cough, wheezing, or shortness of breath  CARDIOVASCULAR: No chest pain, palpitations  GASTROINTESTINAL: No abd pain, nausea, vomiting  GENITOURINARY: No dysuria, frequency, or hematuria  NEUROLOGICAL: no focal weakness or dizziness  MUSCULOSKELETAL: no myalgias     Vital Signs Last 24 Hrs  T(C): 36.2 (09 Jan 2023 05:34), Max: 36.7 (08 Jan 2023 12:40)  T(F): 97.1 (09 Jan 2023 05:34), Max: 98 (08 Jan 2023 12:40)  HR: 78 (09 Jan 2023 05:34) (75 - 89)  BP: 108/61 (09 Jan 2023 06:19) (106/63 - 139/79)  BP(mean): --  RR: 18 (09 Jan 2023 05:34) (18 - 19)  SpO2: 98% (09 Jan 2023 05:34) (94% - 98%)    Parameters below as of 09 Jan 2023 05:34  Patient On (Oxygen Delivery Method): room air        PHYSICAL EXAM:  GENERAL: NAD  HEENT:  anicteric, moist mucous membranes  CHEST/LUNG:  CTA b/l, no rales, wheezes, or rhonchi  HEART:  RRR, S1, S2  ABDOMEN:  BS+, soft, nontender, nondistended  EXTREMITIES: no edema, cyanosis, or calf tenderness  NERVOUS SYSTEM: answers questions and follows commands appropriately    LABS:                        8.0    8.90  )-----------( 509      ( 09 Jan 2023 06:42 )             26.9     CBC Full  -  ( 09 Jan 2023 06:42 )  WBC Count : 8.90 K/uL  Hemoglobin : 8.0 g/dL  Hematocrit : 26.9 %  Platelet Count - Automated : 509 K/uL  Mean Cell Volume : 81.0 fl  Mean Cell Hemoglobin : 24.1 pg  Mean Cell Hemoglobin Concentration : 29.7 gm/dL  Auto Neutrophil # : 7.95 K/uL  Auto Lymphocyte # : 0.63 K/uL  Auto Monocyte # : 0.26 K/uL  Auto Eosinophil # : 0.01 K/uL  Auto Basophil # : 0.01 K/uL  Auto Neutrophil % : 89.4 %  Auto Lymphocyte % : 7.1 %  Auto Monocyte % : 2.9 %  Auto Eosinophil % : 0.1 %  Auto Basophil % : 0.1 %    09 Jan 2023 06:42    139    |  106    |  27     ----------------------------<  160    4.5     |  25     |  1.10     Ca    9.4        09 Jan 2023 06:42  Phos  3.6       09 Jan 2023 06:42  Mg     2.4       09 Jan 2023 06:42    TPro  8.0    /  Alb  1.8    /  TBili  0.2    /  DBili  x      /  AST  123    /  ALT  158    /  AlkPhos  116    09 Jan 2023 06:42        CAPILLARY BLOOD GLUCOSE              RADIOLOGY & ADDITIONAL TESTS:    Personally reviewed.     Consultant(s) Notes Reviewed:  [x] YES  [ ] NO

## 2023-01-09 NOTE — DISCHARGE NOTE PROVIDER - NSDCMRMEDTOKEN_GEN_ALL_CORE_FT
amLODIPine 10 mg oral tablet: 1 tab(s) orally once a day  aspirin 81 mg oral tablet: orally once a day  atorvastatin 80 mg oral tablet: 1 tab(s) orally once a day (at bedtime)  isosorbide mononitrate 30 mg oral tablet, extended release: 1 tab(s) orally once a day (in the morning)  metoprolol succinate 200 mg oral tablet, extended release: 1 tab(s) orally once a day  quinapril 20 mg oral tablet: 1 tab(s) orally once a day  Zetia 10 mg oral tablet: 1 tab(s) orally once a day   amLODIPine 10 mg oral tablet: 1 tab(s) orally once a day  aspirin 81 mg oral tablet: orally once a day  isosorbide mononitrate 30 mg oral tablet, extended release: 1 tab(s) orally once a day (in the morning)  metoprolol succinate 200 mg oral tablet, extended release: 1 tab(s) orally once a day  quinapril 20 mg oral tablet: 1 tab(s) orally once a day   amLODIPine 10 mg oral tablet: 1 tab(s) orally once a day  aspirin 81 mg oral tablet: orally once a day  atorvastatin 80 mg oral tablet: 1 tab(s) orally once a day  ezetimibe 10 mg oral tablet: 1 tab(s) orally once a day  isosorbide mononitrate 30 mg oral tablet, extended release: 1 tab(s) orally once a day (in the morning)  metoprolol succinate 200 mg oral tablet, extended release: 1 tab(s) orally once a day  Plavix 75 mg oral tablet: 1 tab(s) orally once a day   quinapril 20 mg oral tablet: 1 tab(s) orally once a day

## 2023-01-09 NOTE — DISCHARGE NOTE PROVIDER - HOSPITAL COURSE
ADMISSION H+P:    HPI:  76 y/o f w/ PMH of CAD, HTN, HLD p/w chest pain, pressure like in quality, nonradiating, beginning at 1 am last night, not related to food, called EMS, pain remitted after 45 minutes after getting aspirin 324 and nitroglyceirn per pt.  Patient was concerned it may be heart attack.  He has been having diaphoresis for gilmar last few weeks.  He has not noticed any blood in rectum or any bleeding anywhere.      In the ED, he was found to have a Hb of 8.6, with baseline appearing to be 14.  Cards evaluated pt for elevated troponin, feels it is more likely related to demand ischemia.  Radiologist called about CT c/a/p, found pt has large renal mass, and pulm nodule that may represent metastases.   (04 Jan 2023 11:46)      ---  HOSPITAL COURSE:   Patient was admitted for ACS event. Cardio was consulted. Patient was started on Hep gtt for 48 hrs. He was     Patient was medically optimized and improved clinically throughout hospital course. Patient seen and examined on day of discharge.    Vital Signs  T(C): 36.2 (09 Jan 2023 05:34), Max: 36.7 (08 Jan 2023 12:40)  T(F): 97.1 (09 Jan 2023 05:34), Max: 98 (08 Jan 2023 12:40)  HR: 78 (09 Jan 2023 05:34) (75 - 89)  BP: 108/61 (09 Jan 2023 06:19) (106/63 - 139/79)  RR: 18 (09 Jan 2023 05:34) (18 - 19)  SpO2: 98% (09 Jan 2023 05:34) (94% - 98%)    Physical Exam:  General: well-developed, well-nourished, NAD  HEENT: normocephalic, atraumatic, EOMI, moist mucous membranes   Neck: supple, non-tender, no masses  Neurology: AAOx3, sensation intact  Respiratory: clear to auscultation bilaterally; no wheezes, rhonchi, or rales  CV: regular rate and rhythm, soft S1/S2, no murmurs, rubs, or gallops  Abdominal: soft, non-tender, non-distended, bowel sounds present  Extremities: no clubbing, cyanosis, or edema; palpable peripheral pulses  Musculoskeletal: no joint erythema or warmth, no joint swelling   Skin: warm, dry, normal color    Patient is medically stable for discharge to ____ with outpatient follow up.  ---  CONSULTANTS:     ---  TIME SPENT:   I, the attending physician, was physically present for the key portions of the evaluation and management (E/M) service provided. The total amount of time spent reviewing the hospital course, laboratory values, imaging findings, assessing/counseling the patient, discussing with consultant physicians, social work, nursing staff was -- minutes.     ---  FINAL DISCHARGE DIAGNOSIS LIST:  Please see last daily progress note for final discharge diagnoses    ---  Primary care provider was made aware of plan for discharge:  [    ] NO     [     ] YES       ADMISSION H+P:    HPI:  76 y/o f w/ PMH of CAD, HTN, HLD p/w chest pain, pressure like in quality, nonradiating, beginning at 1 am last night, not related to food, called EMS, pain remitted after 45 minutes after getting aspirin 324 and nitroglyceirn per pt.  Patient was concerned it may be heart attack.  He has been having diaphoresis for gilmar last few weeks.  He has not noticed any blood in rectum or any bleeding anywhere.      In the ED, he was found to have a Hb of 8.6, with baseline appearing to be 14.  Cards evaluated pt for elevated troponin, feels it is more likely related to demand ischemia.  Radiologist called about CT c/a/p, found pt has large renal mass, and pulm nodule that may represent metastases.   (04 Jan 2023 11:46)      ---  HOSPITAL COURSE:   Patient was admitted for ACS event. Cardio was consulted. Patient was started on Hep gtt for 48 hrs. He received ASA, Imdur, Metoprolol. Statin was held due to transaminitis. CT chest/abd/pelvis was significant for L renal neoplasm [id number] cm, LLL nodule concern for mets, L4 lucent lesion c/f bone mets 1.5 x 1.4 cm. Heme/onc was consulted and Home Plavix was held as patient was scheduled for bone biopsy.     Patient was medically optimized and improved clinically throughout hospital course. Patient seen and examined on day of discharge.    Vital Signs  T(C): 36.2 (09 Jan 2023 05:34), Max: 36.7 (08 Jan 2023 12:40)  T(F): 97.1 (09 Jan 2023 05:34), Max: 98 (08 Jan 2023 12:40)  HR: 78 (09 Jan 2023 05:34) (75 - 89)  BP: 108/61 (09 Jan 2023 06:19) (106/63 - 139/79)  RR: 18 (09 Jan 2023 05:34) (18 - 19)  SpO2: 98% (09 Jan 2023 05:34) (94% - 98%)    Physical Exam:  General: well-developed, well-nourished, NAD  HEENT: normocephalic, atraumatic, EOMI, moist mucous membranes   Neck: supple, non-tender, no masses  Neurology: AAOx3, sensation intact  Respiratory: clear to auscultation bilaterally; no wheezes, rhonchi, or rales  CV: regular rate and rhythm, soft S1/S2, no murmurs, rubs, or gallops  Abdominal: soft, non-tender, non-distended, bowel sounds present  Extremities: no clubbing, cyanosis, or edema; palpable peripheral pulses  Musculoskeletal: no joint erythema or warmth, no joint swelling   Skin: warm, dry, normal color    Patient is medically stable for discharge to ____ with outpatient follow up.  ---  CONSULTANTS:     ---  TIME SPENT:   I, the attending physician, was physically present for the key portions of the evaluation and management (E/M) service provided. The total amount of time spent reviewing the hospital course, laboratory values, imaging findings, assessing/counseling the patient, discussing with consultant physicians, social work, nursing staff was -- minutes.     ---  FINAL DISCHARGE DIAGNOSIS LIST:  Please see last daily progress note for final discharge diagnoses    ---  Primary care provider was made aware of plan for discharge:  [    ] NO     [     ] YES       ADMISSION H+P:    HPI:  78 y/o f w/ PMH of CAD, HTN, HLD p/w chest pain, pressure like in quality, nonradiating, beginning at 1 am last night, not related to food, called EMS, pain remitted after 45 minutes after getting aspirin 324 and nitroglyceirn per pt.  Patient was concerned it may be heart attack.  He has been having diaphoresis for gilmar last few weeks.  He has not noticed any blood in rectum or any bleeding anywhere.      In the ED, he was found to have a Hb of 8.6, with baseline appearing to be 14.  Cards evaluated pt for elevated troponin, feels it is more likely related to demand ischemia.  Radiologist called about CT c/a/p, found pt has large renal mass, and pulm nodule that may represent metastases.   (04 Jan 2023 11:46)      ---  HOSPITAL COURSE:   Patient was admitted for ACS event. Cardio was consulted. Patient was started on Hep gtt for 48 hrs. He received ASA, Imdur, Metoprolol. GI was consulted and statin was held due to transaminitis. Echo was done and showed EF 55-60%. CT chest/abd/pelvis was significant for L renal neoplasm [id number] cm, LLL nodule concern for mets, L4 lucent lesion c/f bone mets 1.5 x 1.4 cm. Heme/onc was consulted and home Plavix was held as patient was scheduled for bone scan followed by bone biopsy. Bone biopsy and scan was done on 1/9 and patient tolerated procedure well.     Patient was medically optimized and improved clinically throughout hospital course. Patient seen and examined on day of discharge.    Vital Signs  T(C): 36.2 (09 Jan 2023 05:34), Max: 36.7 (08 Jan 2023 12:40)  T(F): 97.1 (09 Jan 2023 05:34), Max: 98 (08 Jan 2023 12:40)  HR: 78 (09 Jan 2023 05:34) (75 - 89)  BP: 108/61 (09 Jan 2023 06:19) (106/63 - 139/79)  RR: 18 (09 Jan 2023 05:34) (18 - 19)  SpO2: 98% (09 Jan 2023 05:34) (94% - 98%)    Physical Exam:  General: well-developed, well-nourished, NAD  HEENT: normocephalic, atraumatic, EOMI, moist mucous membranes   Neck: supple, non-tender, no masses  Neurology: AAOx3, sensation intact  Respiratory: clear to auscultation bilaterally; no wheezes, rhonchi, or rales  CV: regular rate and rhythm, soft S1/S2, no murmurs, rubs, or gallops  Abdominal: soft, non-tender, non-distended, bowel sounds present  Extremities: no clubbing, cyanosis, or edema; palpable peripheral pulses  Musculoskeletal: no joint erythema or warmth, no joint swelling   Skin: warm, dry, normal color    Patient is medically stable for discharge to home with outpatient follow up.  ---  CONSULTANTS:   GI: Dr. Spain  Cardio: [facility]  Heme/onc: Dr. Jay  ---  TIME SPENT:   I, the attending physician, was physically present for the key portions of the evaluation and management (E/M) service provided. The total amount of time spent reviewing the hospital course, laboratory values, imaging findings, assessing/counseling the patient, discussing with consultant physicians, social work, nursing staff was -- minutes.     ---  FINAL DISCHARGE DIAGNOSIS LIST:  Please see last daily progress note for final discharge diagnoses    ---  Primary care provider was made aware of plan for discharge:  [    ] NO     [     ] YES       ADMISSION H+P:    HPI:  76 y/o f w/ PMH of CAD, HTN, HLD p/w chest pain, pressure like in quality, nonradiating, beginning at 1 am last night, not related to food, called EMS, pain remitted after 45 minutes after getting aspirin 324 and nitroglyceirn per pt.  Patient was concerned it may be heart attack.  He has been having diaphoresis for gilmar last few weeks.  He has not noticed any blood in rectum or any bleeding anywhere.      In the ED, he was found to have a Hb of 8.6, with baseline appearing to be 14.  Cards evaluated pt for elevated troponin, feels it is more likely related to demand ischemia.  Radiologist called about CT c/a/p, found pt has large renal mass, and pulm nodule that may represent metastases.   (04 Jan 2023 11:46)      ---  HOSPITAL COURSE:   Patient was admitted for ACS event. Cardio was consulted. Patient was started on Hep gtt for 48 hrs. He received ASA, Imdur, Metoprolol. GI was consulted and statin was held due to transaminitis. Echo was done and showed EF 55-60%. CT chest/abd/pelvis was significant for L renal neoplasm [id number] cm, LLL nodule concern for mets, L4 lucent lesion c/f bone mets 1.5 x 1.4 cm. Heme/onc was consulted and home Plavix was held as patient was scheduled for bone scan followed by bone biopsy.  Bone scan was done on 1/9, unable to obtain bone biopsy.    Patient was medically optimized and improved clinically throughout hospital course. Patient seen and examined on day of discharge.    Vital Signs  T(C): 36.2 (09 Jan 2023 05:34), Max: 36.7 (08 Jan 2023 12:40)  T(F): 97.1 (09 Jan 2023 05:34), Max: 98 (08 Jan 2023 12:40)  HR: 78 (09 Jan 2023 05:34) (75 - 89)  BP: 108/61 (09 Jan 2023 06:19) (106/63 - 139/79)  RR: 18 (09 Jan 2023 05:34) (18 - 19)  SpO2: 98% (09 Jan 2023 05:34) (94% - 98%)    Physical Exam:  General: well-developed, well-nourished, NAD  HEENT: normocephalic, atraumatic, EOMI, moist mucous membranes   Neck: supple, non-tender, no masses  Neurology: AAOx3, sensation intact  Respiratory: clear to auscultation bilaterally; no wheezes, rhonchi, or rales  CV: regular rate and rhythm, soft S1/S2, no murmurs, rubs, or gallops  Abdominal: soft, non-tender, non-distended, bowel sounds present  Extremities: no clubbing, cyanosis, or edema; palpable peripheral pulses  Musculoskeletal: no joint erythema or warmth, no joint swelling   Skin: warm, dry, normal color    Patient is medically stable for discharge to home with outpatient follow up.  ---  CONSULTANTS:   GI: Dr. Spain  Cardio: [facility]  Heme/onc: Dr. Jay  ---  TIME SPENT:   I, the attending physician, was physically present for the key portions of the evaluation and management (E/M) service provided. The total amount of time spent reviewing the hospital course, laboratory values, imaging findings, assessing/counseling the patient, discussing with consultant physicians, social work, nursing staff was -- minutes.     ---  FINAL DISCHARGE DIAGNOSIS LIST:  Please see last daily progress note for final discharge diagnoses    ---  Primary care provider was made aware of plan for discharge:  [    ] NO     [     ] YES       ADMISSION H+P:    HPI:  78 y/o f w/ PMH of CAD, HTN, HLD p/w chest pain, pressure like in quality, nonradiating, beginning at 1 am last night, not related to food, called EMS, pain remitted after 45 minutes after getting aspirin 324 and nitroglyceirn per pt.  Patient was concerned it may be heart attack.  He has been having diaphoresis for gilmar last few weeks.  He has not noticed any blood in rectum or any bleeding anywhere.      In the ED, he was found to have a Hb of 8.6, with baseline appearing to be 14.  Cards evaluated pt for elevated troponin, feels it is more likely related to demand ischemia.  Radiologist called about CT c/a/p, found pt has large renal mass, and pulm nodule that may represent metastases.   (04 Jan 2023 11:46)      ---  HOSPITAL COURSE:   Patient was admitted for ACS event. Cardio was consulted. Patient was started on Hep gtt for 48 hrs. He received ASA, Imdur, Metoprolol. GI was consulted and statin was held due to transaminitis. Echo was done and showed EF 55-60%. CT chest/abd/pelvis was significant for L renal lesion [id number] cm concerning for malignancy, LLL nodule concern for mets, L4 lucent lesion c/f bone mets 1.5 x 1.4 cm. Heme/onc was consulted and home Plavix was held as patient was scheduled for bone scan followed by bone biopsy.  Bone scan was done on 1/9, no discrete osseous lesions noted. Discussed with IR, as bone scan negative for osseous lesions, recommend obtaining kidney biopsy on Thursday (need to be off plavix for 5 days), however, patient refusing to stay, would like to pursue further workup as outpatient.    Patient was medically optimized and improved clinically throughout hospital course. Patient seen and examined on day of discharge.    Vital Signs  T(C): 36.2 (09 Jan 2023 05:34), Max: 36.7 (08 Jan 2023 12:40)  T(F): 97.1 (09 Jan 2023 05:34), Max: 98 (08 Jan 2023 12:40)  HR: 78 (09 Jan 2023 05:34) (75 - 89)  BP: 108/61 (09 Jan 2023 06:19) (106/63 - 139/79)  RR: 18 (09 Jan 2023 05:34) (18 - 19)  SpO2: 98% (09 Jan 2023 05:34) (94% - 98%)    Physical Exam:  General: well-developed, well-nourished, NAD  HEENT: normocephalic, atraumatic, EOMI, moist mucous membranes   Neck: supple, non-tender, no masses  Neurology: AAOx3, sensation intact  Respiratory: clear to auscultation bilaterally; no wheezes, rhonchi, or rales  CV: regular rate and rhythm, soft S1/S2, no murmurs, rubs, or gallops  Abdominal: soft, non-tender, non-distended, bowel sounds present  Extremities: no clubbing, cyanosis, or edema; palpable peripheral pulses  Musculoskeletal: no joint erythema or warmth, no joint swelling   Skin: warm, dry, normal color    Patient is medically stable for discharge to home with outpatient follow up.  ---  CONSULTANTS:   GI: Dr. Spain  Cardio: [facility]  Heme/onc: Dr. Jay  ---  TIME SPENT:   I, the attending physician, was physically present for the key portions of the evaluation and management (E/M) service provided. The total amount of time spent reviewing the hospital course, laboratory values, imaging findings, assessing/counseling the patient, discussing with consultant physicians, social work, nursing staff was 37 minutes.     ---       ADMISSION H+P:    HPI:  78 y/o f w/ PMH of CAD, HTN, HLD p/w chest pain, pressure like in quality, nonradiating, beginning at 1 am last night, not related to food, called EMS, pain remitted after 45 minutes after getting aspirin 324 and nitroglyceirn per pt.  Patient was concerned it may be heart attack.  He has been having diaphoresis for gilmar last few weeks.  He has not noticed any blood in rectum or any bleeding anywhere.      In the ED, he was found to have a Hb of 8.6, with baseline appearing to be 14.  Cards evaluated pt for elevated troponin, feels it is more likely related to demand ischemia.  Radiologist called about CT c/a/p, found pt has large renal mass, and pulm nodule that may represent metastases.   (04 Jan 2023 11:46)      ---  HOSPITAL COURSE:   Patient was admitted for ACS event. Cardio was consulted. Patient was started on Hep gtt for 48 hrs. He received ASA, Imdur, Metoprolol. GI was consulted and statin was held due to transaminitis. Echo was done and showed EF 55-60%. CT chest/abd/pelvis was significant for L renal lesion [id number] cm concerning for malignancy, LLL nodule concern for mets, L4 lucent lesion c/f bone mets 1.5 x 1.4 cm. Heme/onc was consulted and home Plavix was held as patient was scheduled for bone scan followed by bone biopsy.  Bone scan was done on 1/9, no discrete osseous lesions noted. Discussed with IR, as bone scan negative for osseous lesions, recommend obtaining kidney biopsy on Thursday (need to be off plavix for 5 days), however, patient refusing to stay, would like to pursue further workup as outpatient.    Patient was medically optimized and improved clinically throughout hospital course. Patient seen and examined on day of discharge.    Vital Signs  T(C): 36.2 (09 Jan 2023 05:34), Max: 36.7 (08 Jan 2023 12:40)  T(F): 97.1 (09 Jan 2023 05:34), Max: 98 (08 Jan 2023 12:40)  HR: 78 (09 Jan 2023 05:34) (75 - 89)  BP: 108/61 (09 Jan 2023 06:19) (106/63 - 139/79)  RR: 18 (09 Jan 2023 05:34) (18 - 19)  SpO2: 98% (09 Jan 2023 05:34) (94% - 98%)    PHYSICAL EXAM:  GENERAL: well-developed, well-nourished, NAD  HEENT:  anicteric, moist mucous membranes  CHEST/LUNG:  CTA b/l, no rales, wheezes, or rhonchi  HEART:  RRR, S1, S2  ABDOMEN:  BS+, soft, nontender, nondistended  EXTREMITIES: no edema, cyanosis, or calf tenderness  NERVOUS SYSTEM: answers questions and follows commands appropriately    Patient is medically stable for discharge to home with outpatient follow up.  ---  CONSULTANTS:   GI: Dr. Spain  Cardio: [facility]  Heme/onc: Dr. Jay  ---  TIME SPENT:   I, the attending physician, was physically present for the key portions of the evaluation and management (E/M) service provided. The total amount of time spent reviewing the hospital course, laboratory values, imaging findings, assessing/counseling the patient, discussing with consultant physicians, social work, nursing staff was 37 minutes.     ---       ADMISSION H+P:    HPI:  78 y/o f w/ PMH of CAD, HTN, HLD p/w chest pain, pressure like in quality, nonradiating, beginning at 1 am last night, not related to food, called EMS, pain remitted after 45 minutes after getting aspirin 324 and nitroglyceirn per pt.  Patient was concerned it may be heart attack.  He has been having diaphoresis for gilmar last few weeks.  He has not noticed any blood in rectum or any bleeding anywhere.      In the ED, he was found to have a Hb of 8.6, with baseline appearing to be 14.  Cards evaluated pt for elevated troponin, feels it is more likely related to demand ischemia.  Radiologist called about CT c/a/p, found pt has large renal mass, and pulm nodule that may represent metastases.   (04 Jan 2023 11:46)      ---  HOSPITAL COURSE:   Patient was admitted for ACS event. Cardio was consulted. Patient was started on Hep gtt for 48 hrs. He received ASA, Imdur, Metoprolol. GI was consulted and statin was held due to transaminitis. Echo was done and showed EF 55-60%. CT chest/abd/pelvis was significant for L renal lesion [id number] cm concerning for malignancy, LLL nodule concern for mets, L4 lucent lesion c/f bone mets 1.5 x 1.4 cm. Heme/onc was consulted and home Plavix was held as patient was scheduled for bone scan followed by bone biopsy.  Bone scan was done on 1/9, no discrete osseous lesions noted. Discussed with IR, as bone scan negative for osseous lesions, recommend obtaining lung biopsy early next week (need to be off plavix and ASA for 5 days). MRI pelvis done showing 3 bony lesions likely hemangiomas, also moderate to severe canal stenosis without significant central canal compromise.    Patient was medically optimized and improved clinically throughout hospital course. Patient seen and examined on day of discharge.    Vital Signs  T(C): 36.2 (09 Jan 2023 05:34), Max: 36.7 (08 Jan 2023 12:40)  T(F): 97.1 (09 Jan 2023 05:34), Max: 98 (08 Jan 2023 12:40)  HR: 78 (09 Jan 2023 05:34) (75 - 89)  BP: 108/61 (09 Jan 2023 06:19) (106/63 - 139/79)  RR: 18 (09 Jan 2023 05:34) (18 - 19)  SpO2: 98% (09 Jan 2023 05:34) (94% - 98%)    PHYSICAL EXAM:  GENERAL: well-developed, well-nourished, NAD  HEENT:  anicteric, moist mucous membranes  CHEST/LUNG:  CTA b/l, no rales, wheezes, or rhonchi  HEART:  RRR, S1, S2  ABDOMEN:  BS+, soft, nontender, nondistended  EXTREMITIES: no edema, cyanosis, or calf tenderness  NERVOUS SYSTEM: answers questions and follows commands appropriately    Patient is medically stable for discharge to home with outpatient follow up.  ---  CONSULTANTS:   GI: Dr. Spain  Cardio: [facility]  Heme/onc: Dr. Jay  ---  TIME SPENT:   I, the attending physician, was physically present for the key portions of the evaluation and management (E/M) service provided. The total amount of time spent reviewing the hospital course, laboratory values, imaging findings, assessing/counseling the patient, discussing with consultant physicians, social work, nursing staff was 37 minutes.     ---

## 2023-01-09 NOTE — DISCHARGE NOTE PROVIDER - NSDCFUSCHEDAPPT_GEN_ALL_CORE_FT
Royce Palacio, [facility]  [facility] P[facility]mit  Scheduled Appointment: 01/12/2023    Martin Eller  [facility]  DERM [address] [facility]  Scheduled Appointment: 01/19/2023    Kiko Schultz  [facility]  CARDIOLOGY [address] [facility]  Scheduled Appointment: 02/27/2023     Royce Palacio, [facility]  [facility]  Scheduled Appointment: 01/12/2023    Golden Marte  [facility] Phys[facility]  INTERVEN [address]  Scheduled Appointment: 01/17/2023    Martin Eller  [facility]  DERM [address] [facility] [address]  Scheduled Appointment: 01/19/2023    Kiko Schultz  [facility]  CARDIOLOGY [address] [facility]  Scheduled Appointment: 02/27/2023

## 2023-01-09 NOTE — PROGRESS NOTE ADULT - ASSESSMENT
78 yo M w/ PMH of CAD, HTN, HLD p/w chest pain, now being managed for ACS, anemia workup, and imaging concerning for renal mass w/ metastasis. 78 yo M w/ PMH of CAD, HTN, HLD p/w chest pain, now being managed for ACS, anemia workup, and imaging concerning for renal mass w/ metastasis. Pending bone scan and bone biopsy today 76 yo M w/ PMH of CAD, HTN, HLD p/w chest pain, now being managed for ACS, anemia workup, and imaging concerning for renal mass w/ metastasis. Scheduled for bone scan and bone biopsy today.

## 2023-01-09 NOTE — DISCHARGE NOTE PROVIDER - PROVIDER TOKENS
PROVIDER:[TOKEN:[[id number]]],PROVIDER:[TOKEN:[[id number]]],PROVIDER:[TOKEN:[[id number]]] PROVIDER:[TOKEN:[[id number]]],PROVIDER:[TOKEN:[[id number]]],PROVIDER:[TOKEN:[[id number]]]

## 2023-01-09 NOTE — DISCHARGE NOTE PROVIDER - NSDCCPCAREPLAN_GEN_ALL_CORE_FT
PRINCIPAL DISCHARGE DIAGNOSIS  Diagnosis: Chest pain, unspecified  Assessment and Plan of Treatment: You were admitted for ACS event. You were managed with Heparin gtt for 48hrs, followed by ASA, Imdur, Metoprolol. Plavix was held due to bone biopsy. Statin was held due to elevated liver function test.       PRINCIPAL DISCHARGE DIAGNOSIS  Diagnosis: Chest pain, unspecified  Assessment and Plan of Treatment: You were admitted for ACS event. You were managed with Heparin gtt for 48hrs, followed by ASA, Imdur, Metoprolol. Plavix was held due to bone biopsy. Statin was held due to elevated liver function test. Echo was done which showed EF 55-60%. Follow up with Cardio within 2 weeks of discharge.      SECONDARY DISCHARGE DIAGNOSES  Diagnosis: Malignancy  Assessment and Plan of Treatment: - Abnormal findings on CT chest/abd/pelvis: L renal neoplasm [id number] cm, LLL nodule concern for mets, L4 lucent lesion c/f bone mets 1.5 x 1.4 cm  - You had a bone scan and bone biopsy on 1/9   - Follow up with Heme/onc within 1 week of discharge    Diagnosis: Transaminitis  Assessment and Plan of Treatment: You were found to have elevated liver enzymes during the hospitalization. Please follow up with your PCP within 2 weeks of discharge.    Diagnosis: Anemia  Assessment and Plan of Treatment: You have a known history of anemia. Your blood counts were closely monitored and remained stable throughout the hospitalization. Follow up with your PCP within 2 weeks of discharge for evaluation of blood counts.    Diagnosis: HTN (hypertension)  Assessment and Plan of Treatment: Continue Quinapril, Imdur, Metoprolol, Amlodipine    Diagnosis: HLD (hyperlipidemia)  Assessment and Plan of Treatment: Continue ASA. Hold home statin given elevated liver enzymes.     PRINCIPAL DISCHARGE DIAGNOSIS  Diagnosis: Chest pain, unspecified  Assessment and Plan of Treatment: You were admitted for ACS event. You were managed with Heparin gtt for 48hrs, followed by ASA, Imdur, Metoprolol. Plavix was held due to bone biopsy. Statin was held due to elevated liver function test. Echo was done which showed EF 55-60%. Follow up with Cardio within 2 weeks of discharge.      SECONDARY DISCHARGE DIAGNOSES  Diagnosis: Malignancy  Assessment and Plan of Treatment: - Abnormal findings on CT chest/abd/pelvis: L renal neoplasm 95s0v02 cm, LLL nodule concern for mets, L4 lucent lesion c/f bone mets 1.5 x 1.4 cm  - You had a bone scan and bone biopsy on 1/9   - Follow up with Heme/onc within 1 week of discharge    Diagnosis: Anemia  Assessment and Plan of Treatment: You have a known history of anemia. Your blood counts were closely monitored and remained stable throughout the hospitalization. Follow up with your PCP within 2 weeks of discharge for evaluation of blood counts.    Diagnosis: HTN (hypertension)  Assessment and Plan of Treatment: Continue Quinapril, Imdur, Metoprolol, Amlodipine    Diagnosis: HLD (hyperlipidemia)  Assessment and Plan of Treatment: Continue ASA. Hold home statin given elevated liver enzymes.    Diagnosis: Transaminitis  Assessment and Plan of Treatment: You were found to have elevated liver enzymes during the hospitalization. Please follow up with your PCP within 2 weeks of discharge.     PRINCIPAL DISCHARGE DIAGNOSIS  Diagnosis: Chest pain, unspecified  Assessment and Plan of Treatment: You were admitted for ACS event. You were managed with Heparin gtt for 48hrs, followed by ASA, plavix, Imdur, Metoprolol.  Statin was held due to elevated liver function test. Echo was done which showed EF 55-60%. Follow up with Cardio within 2 weeks of discharge.      SECONDARY DISCHARGE DIAGNOSES  Diagnosis: Malignancy  Assessment and Plan of Treatment: - Abnormal findings on CT chest/abd/pelvis: L renal neoplasm 31l1u90 cm, LLL nodule concern for mets, L4 lucent lesion c/f bone mets 1.5 x 1.4 cm  - You had a bone scan and bone biopsy on 1/9   - Follow up with Heme/onc within 1 week of discharge    Diagnosis: Anemia  Assessment and Plan of Treatment: You have a known history of anemia. Your blood counts were closely monitored and remained stable throughout the hospitalization. Follow up with your PCP within 2 weeks of discharge for evaluation of blood counts.    Diagnosis: HTN (hypertension)  Assessment and Plan of Treatment: Continue Quinapril, Imdur, Metoprolol, Amlodipine    Diagnosis: HLD (hyperlipidemia)  Assessment and Plan of Treatment: Continue ASA. plavix, and statin.    Diagnosis: Transaminitis  Assessment and Plan of Treatment: You were found to have elevated liver enzymes during the hospitalization.   Please follow up with your PCP within 2 weeks of discharge.     PRINCIPAL DISCHARGE DIAGNOSIS  Diagnosis: Chest pain, unspecified  Assessment and Plan of Treatment: You were admitted for ACS event. You were managed with Heparin gtt for 48hrs, followed by ASA, plavix, Imdur, Metoprolol.  Statin was held due to elevated liver function test. Echo was done which showed EF 55-60%. Follow up with Cardio within 2 weeks of discharge.      SECONDARY DISCHARGE DIAGNOSES  Diagnosis: Renal lesion  Assessment and Plan of Treatment: - Abnormal findings on CT chest/abd/pelvis: L renal lesion 27t7o11 cm concerning for malignancy, LLL nodule concern for mets, L4 lucent lesion c/f bone mets 1.5 x 1.4 cm  - You had a bone scan on 1/9 which did not show any discrete bone lesions, but did show degenerative changes to major joints  - Follow up with Heme/onc within 1 week of discharge for further work up and to arrange for kidney biopsy    Diagnosis: Anemia  Assessment and Plan of Treatment: You have a known history of anemia. Your blood counts were closely monitored and remained stable throughout the hospitalization. Follow up with your PCP within 2 weeks of discharge for evaluation of blood counts.    Diagnosis: HTN (hypertension)  Assessment and Plan of Treatment: Continue Quinapril, Imdur, Metoprolol, Amlodipine    Diagnosis: HLD (hyperlipidemia)  Assessment and Plan of Treatment: Continue ASA. plavix, and statin.    Diagnosis: Transaminitis  Assessment and Plan of Treatment: You were found to have elevated liver enzymes during the hospitalization.   Please follow up with your PCP within 2 weeks of discharge for repeat blood work.     PRINCIPAL DISCHARGE DIAGNOSIS  Diagnosis: Chest pain, unspecified  Assessment and Plan of Treatment: You were admitted for ACS event. You were managed with Heparin gtt for 48hrs, followed by ASA, plavix, Imdur, Metoprolol.  Statin was held due to elevated liver function test. Echo was done which showed EF 55-60%.   UPON DISCHARGE ONLY TAKE YOUR ASPIRIN AND PLAVIX FOR ONE DAY. STOP YOUR ASPIRIN AND PLAVIX ON WEDNESDAY NIGHT IN PREPARATION FOR A POSSIBLE BIOPSY THE WEEK OF 1/16/23.  Make the appropriate follow up appointments as follows:  PCP in 1 week  Heme/Onc Dr. Hester in 1 week  Cardiologist in 1-2 weeks      SECONDARY DISCHARGE DIAGNOSES  Diagnosis: Anemia  Assessment and Plan of Treatment: You have a known history of anemia. Your blood counts were closely monitored and remained stable throughout the hospitalization. Follow up with your PCP within 2 weeks of discharge for evaluation of blood counts.    Diagnosis: HTN (hypertension)  Assessment and Plan of Treatment: Continue Quinapril, Imdur, Metoprolol, Amlodipine    Diagnosis: HLD (hyperlipidemia)  Assessment and Plan of Treatment: Continue ASA. plavix, and statin.  UPON DISCHARGE ONLY TAKE YOUR ASPIRIN AND PLAVIX FOR ONE DAY. STOP YOUR ASPIRIN AND PLAVIX ON WEDNESDAY NIGHT IN PREPARATION FOR A POSSIBLE BIOPSY THE WEEK OF 1/16/23.    Diagnosis: Transaminitis  Assessment and Plan of Treatment: You were found to have elevated liver enzymes during the hospitalization.   Please follow up with your PCP within 2 weeks of discharge for repeat blood work.    Diagnosis: Renal lesion  Assessment and Plan of Treatment: - Abnormal findings on CT chest/abd/pelvis: L renal lesion 30m3s05 cm concerning for malignancy, LLL nodule concern for mets, L4 lucent lesion c/f bone mets 1.5 x 1.4 cm  - You had a bone scan on 1/9 which did not show any discrete bone lesions, but did show degenerative changes to major joints  - You had an MRI of the lumbar spine and a skeletal survery performed  - Follow up with Heme/onc within 1 week of discharge for further work up and to arrange for a biopsy     PRINCIPAL DISCHARGE DIAGNOSIS  Diagnosis: Chest pain, unspecified  Assessment and Plan of Treatment: You were admitted for ACS event. You were managed with Heparin gtt for 48hrs, followed by ASA, plavix, Imdur, Metoprolol.  Statin was held due to elevated liver function test. Echo was done which showed EF 55-60%.   CONTINUE ASPIRIN AND PLAVIX DAILY UNTIL TOLD TO HOLD MEDICATIONS IN PREPARATION FOR LIKELY LUNG BIOPSY EARLY NEXT WEEK. YOU WILL NEED TO STOP ASA AND PLAVIX 5 DAYS PRIOR TO BIOPSY.  Make the appropriate follow up appointments as follows:  PCP in 1 week  Heme/Onc Dr. Hester in 1 week  Cardiologist in 1-2 weeks      SECONDARY DISCHARGE DIAGNOSES  Diagnosis: Renal lesion  Assessment and Plan of Treatment: - Abnormal findings on CT chest/abd/pelvis: L renal lesion 45w8v24 cm concerning for malignancy, LLL nodule concern for mets, L4 lucent lesion c/f bone mets 1.5 x 1.4 cm  - You had a bone scan on 1/9 which did not show any discrete bone lesions, but did show degenerative changes to major joints  - You had an MRI of the lumbar spine and a skeletal survery performed  - Follow up with Heme/onc within 1 week of discharge for further work up and to arrange for a biopsy    Diagnosis: Anemia  Assessment and Plan of Treatment: You have a known history of anemia. Your blood counts were closely monitored and remained stable throughout the hospitalization. Follow up with your PCP within 2 weeks of discharge for evaluation of blood counts.    Diagnosis: HTN (hypertension)  Assessment and Plan of Treatment: Continue Quinapril, Imdur, Metoprolol, Amlodipine    Diagnosis: HLD (hyperlipidemia)  Assessment and Plan of Treatment: Continue ASA. plavix, and statin.  CONTINUE ASPIRIN AND PLAVIX DAILY UNTIL TOLD TO HOLD MEDICATIONS IN PREPARATION FOR LIKELY LUNG BIOPSY EARLY NEXT WEEK. YOU WILL NEED TO STOP ASA AND PLAVIX 5 DAYS PRIOR TO BIOPSY.      Diagnosis: Transaminitis  Assessment and Plan of Treatment: You were found to have elevated liver enzymes during the hospitalization.   Please follow up with your PCP within 2 weeks of discharge for repeat blood work.

## 2023-01-09 NOTE — PROGRESS NOTE ADULT - PROBLEM SELECTOR PLAN 1
- Abnormal findings on CT chest/abd/pelvis: L renal neoplasm 66g8n67 cm, LLL nodule concern for mets, L4 lucent lesion c/f bone mets 1.5 x 1.4 cm  - Suspected renal malignancy with mets with associated severe night sweats  - Continue Prednisone 40mg qhs  - Bone scan and bone biopsy today with IR ***  - Heme/onc following, recs appreciated - Abnormal findings on CT chest/abd/pelvis: L renal neoplasm 47a5h11 cm, LLL nodule concern for mets, L4 lucent lesion c/f bone mets 1.5 x 1.4 cm  - Suspected renal malignancy with mets with associated severe night sweats  - Continue Prednisone 40mg qhs  - Bone scan and bone biopsy today with IR  - Heme/onc following, recs appreciated - Abnormal findings on CT chest/abd/pelvis: L renal neoplasm 26q2w76 cm, LLL nodule concern for mets, L4 lucent lesion c/f bone mets 1.5 x 1.4 cm  - Suspected renal malignancy with mets with associated severe night sweats  - Continue Prednisone 40mg qhs  - Bone scan performed today - due to timing of shuttle for bone scan unable to perform biopsy today, patient to follow up outpatient for bx  - Heme/onc following, recs appreciated - Abnormal findings on CT chest/abd/pelvis: L renal neoplasm 51c8t46 cm, LLL nodule concern for mets, L4 lucent lesion c/f bone mets 1.5 x 1.4 cm  - Suspected renal malignancy with mets with associated severe night sweats  - Continue Prednisone 40mg qhs  - Bone scan performed today - equivocal study and timing issue 2/2 shuttle service - bone bx not performed  - MRI this evening + skeletal Survey tomorrow  - D/w IR at [facility] - complicated case that will require tertiary care services and support, aim for 1/15  - ASA and Plavix must be dc'd x 5 days prior to any lung bx or asa continued and plavix held for 2 days prior to bone bx  - Heme/onc following, recs appreciated - Abnormal findings on CT chest/abd/pelvis: L renal neoplasm 32d0n06 cm, LLL nodule concern for mets, L4 lucent lesion c/f bone mets 1.5 x 1.4 cm  - Suspected renal malignancy with mets with associated severe night sweats  - Continue Prednisone 40mg qhs  - Bone scan performed today - equivocal study and timing issue 2/2 shuttle service - bone bx not performed  - MRI this evening + skeletal Survey tomorrow  - D/w IR at [place] - complicated case that will require tertiary care services and support, plan for from [facility] and set up for f/u 1/15 in [place]  - ASA and Plavix must be dc'd x 5 days prior to any lung bx or asa continued and plavix held for 2 days prior to bone bx  - Heme/onc following, recs appreciated

## 2023-01-09 NOTE — PROGRESS NOTE ADULT - PROBLEM SELECTOR PLAN 2
Chest pain likely ACS event, although unlikely significant infarct per Cardio  - S/p Heparin gtt for 48hrs  - Continue ASA, Imdur, Metoprolol  - Hold Plavix for pending bone biopsy  - Hold home atorvastatin in the setting of transaminitis  - CT chest/abd/pelvis - LV aneurysm  - Echo: EF 55-60%  - Cardiology ([facility]) following, recs appreciated Chest pain likely ACS event, although unlikely significant infarct per Cardio  - S/p Heparin gtt for 48hrs  - Continue ASA, Imdur, Metoprolol  - Hold Plavix on Wednesday w/ for 1/15 bx  - Hold home atorvastatin in the setting of transaminitis  - CT chest/abd/pelvis - LV aneurysm  - Echo: EF 55-60%  - Cardiology ([facility]) following, recs appreciated

## 2023-01-09 NOTE — PROGRESS NOTE ADULT - SUBJECTIVE AND OBJECTIVE BOX
[facility]  Vivek Cole PA-C  [address] [place] [address]  [phone]      INTERVAL HPI/OVERNIGHT EVENTS:  Pt s/e  Reports no new GI complaints  Denies overt GI bleeding    MEDICATIONS  (STANDING):  amLODIPine   Tablet 10 milliGRAM(s) Oral daily  aspirin  chewable 81 milliGRAM(s) Oral daily  dextrose 5% + sodium chloride 0.9%. 1000 milliLiter(s) (50 mL/Hr) IV Continuous <Continuous>  ezetimibe 10 milliGRAM(s) Oral daily  influenza  Vaccine (HIGH DOSE) 0.7 milliLiter(s) IntraMuscular once  isosorbide   mononitrate ER Tablet (IMDUR) 30 milliGRAM(s) Oral daily  lisinopril 20 milliGRAM(s) Oral daily  metoprolol succinate  milliGRAM(s) Oral daily    MEDICATIONS  (PRN):  LORazepam   Injectable 0.25 milliGRAM(s) IV Push once PRN Anxiety      Allergies    No Known Allergies      PHYSICAL EXAM:   Vital Signs:  Vital Signs Last 24 Hrs  T(C): 36.2 (09 Jan 2023 05:34), Max: 36.7 (08 Jan 2023 12:40)  T(F): 97.1 (09 Jan 2023 05:34), Max: 98 (08 Jan 2023 12:40)  HR: 78 (09 Jan 2023 05:34) (75 - 89)  BP: 108/61 (09 Jan 2023 06:19) (106/63 - 139/79)  BP(mean): --  RR: 18 (09 Jan 2023 05:34) (18 - 19)  SpO2: 98% (09 Jan 2023 05:34) (94% - 98%)    Parameters below as of 09 Jan 2023 05:34  Patient On (Oxygen Delivery Method): room air    GENERAL:  Appears stated age  ABDOMEN:  Soft, non-tender, non-distended  NEURO:  Alert      LABS:                        8.0    8.90  )-----------( 509      ( 09 Jan 2023 06:42 )             26.9     01-09    139  |  106  |  27<H>  ----------------------------<  160<H>  4.5   |  25  |  1.10    Ca    9.4      09 Jan 2023 06:42  Phos  3.6     01-09  Mg     2.4     01-09    TPro  8.0  /  Alb  1.8<L>  /  TBili  0.2  /  DBili  x   /  AST  123<H>  /  ALT  158<H>  /  AlkPhos  116  01-09

## 2023-01-09 NOTE — PROGRESS NOTE ADULT - SUBJECTIVE AND OBJECTIVE BOX
[INTERVAL HX: ]  Patient seen and examined;  Chart reviewed and events noted;     s/p Bone scan with no bone lesion.   Discussed case with pt and GF.       [MEDICATIONS]  MEDICATIONS  (STANDING):  amLODIPine   Tablet 10 milliGRAM(s) Oral daily  aspirin  chewable 81 milliGRAM(s) Oral daily  ezetimibe 10 milliGRAM(s) Oral daily  influenza  Vaccine (HIGH DOSE) 0.7 milliLiter(s) IntraMuscular once  isosorbide   mononitrate ER Tablet (IMDUR) 30 milliGRAM(s) Oral daily  lisinopril 20 milliGRAM(s) Oral daily  metoprolol succinate  milliGRAM(s) Oral daily      MEDICATIONS  (PRN):  LORazepam   Injectable 0.25 milliGRAM(s) IV Push once PRN Anxiety      [VITALS]  Vital Signs Last 24 Hrs  T(C): 36.2 (09 Jan 2023 05:34), Max: 36.4 (08 Jan 2023 20:18)  T(F): 97.1 (09 Jan 2023 05:34), Max: 97.6 (08 Jan 2023 20:18)  HR: 78 (09 Jan 2023 05:34) (78 - 89)  BP: 108/61 (09 Jan 2023 06:19) (108/61 - 139/79)  BP(mean): --  RR: 18 (09 Jan 2023 05:34) (18 - 19)  SpO2: 98% (09 Jan 2023 05:34) (97% - 98%)    Parameters below as of 09 Jan 2023 05:34  Patient On (Oxygen Delivery Method): room air      [WT/HT]  Daily     Daily   [VENT]      [PHYSICAL EXAM]  WN WD NAD  anicteric  S1 S2 RRR  CTAB  soft NABS NT ND no HSM    [LABS:]                        8.0    8.90  )-----------( 509      ( 09 Jan 2023 06:42 )             26.9     01-09    139  |  106  |  27<H>  ----------------------------<  160<H>  4.5   |  25  |  1.10    Ca    9.4      09 Jan 2023 06:42  Phos  3.6     01-09  Mg     2.4     01-09    TPro  8.0  /  Alb  1.8<L>  /  TBili  0.2  /  DBili  x   /  AST  123<H>  /  ALT  158<H>  /  AlkPhos  116  01-09      Iron - Total Binding Capacity.: 251 ug/dL [220 - 430] (01-05-23 @ 09:35)  Iron - Total Binding Capacity.: 264 ug/dL [220 - 430] (01-05-23 @ 06:45)  Ferritin, Serum: 860 ng/mL *H* [30 - 400] (01-05-23 @ 06:45)  Vitamin B12, Serum: 912 pg/mL [232 - 1245] (01-05-23 @ 06:45)  Folate, Serum: >20.0 ng/mL (01-05-23 @ 06:45)  Sedimentation Rate, Erythrocyte: 119 mm/hr *H* [0 - 20] (01-05-23 @ 06:45)  Ferritin, Serum: 889 ng/mL *H* [30 - 400] (01-04-23 @ 19:16)  Vitamin B12, Serum: 905 pg/mL [232 - 1245] (01-04-23 @ 19:16)  Folate, Serum: >20.0 ng/mL (01-04-23 @ 19:16)        Culture - Urine (collected 08 Jan 2023 03:00)  Source: Clean Catch Clean Catch (Midstream)  Final Report (09 Jan 2023 14:59):    10,000 - 49,000 CFU/mL Coag Negative Staphylococcus "Susceptibilities not    performed"    Culture - Blood (collected 08 Jan 2023 02:50)  Source: .Blood Blood-Peripheral  Preliminary Report (09 Jan 2023 12:02):    No growth to date.    Culture - Blood (collected 08 Jan 2023 02:49)  Source: .Blood Blood-Peripheral  Preliminary Report (09 Jan 2023 12:02):    No growth to date.      COVID-19 PCR: NotDetec (08 Jan 2023 10:25)  COVID-19 PCR: NotDetec (04 Jan 2023 03:10)      Culture - Urine (collected 08 Jan 2023 03:00)  Source: Clean Catch Clean Catch (Midstream)  Final Report (09 Jan 2023 14:59):    10,000 - 49,000 CFU/mL Coag Negative Staphylococcus "Susceptibilities not    performed"    Culture - Blood (collected 08 Jan 2023 02:50)  Source: .Blood Blood-Peripheral  Preliminary Report (09 Jan 2023 12:02):    No growth to date.    Culture - Blood (collected 08 Jan 2023 02:49)  Source: .Blood Blood-Peripheral  Preliminary Report (09 Jan 2023 12:02):    No growth to date.      [RADIOLOGY STUDIES:]

## 2023-01-09 NOTE — PROGRESS NOTE ADULT - ASSESSMENT
[ASSESSMENT and  PLAN]  Metastatic renal cell cancer [suspected]  Lung metastasis  Bone metastasis  Acute coronary syndrome  LBBB  Anemia due to chronic disease  Anemia possibly due to bleeding  Anemia due to iron deficiency  MGUS    77-year-old male admitted with chest pain and new left bundle branch block.  Seen recently outpatient for preop evaluation for left ICA Endarterectomy  Also noted to have new/worsening anemia.  Additional history with recent evaluation and referrals for anemia and hematuria.  Saw new PMD recently with work-up outpatient showing monoclonal protein.    CTA of the chest and CT AP showing no evidence of pulmonary embolism.  Small volume mediastinum and hilar lymph nodes.  Borderline spleen 13 cm.  Left renal heterogeneous hypervascular mass concerning for renal cell carcinoma measuring 11.1 x 9.6 x 12.4 cm with multiple areas of internal necrosis and numerous parasitic surrounding vessels.  Mass is partially endophytic and mostly exophytic.  No hydronephrosis. Mass abuts the left colon and demonstrates mass-effect on the mesentery.  Small volume retroperitoneal lymph nodes.  On 0.5 x 1.4 cm lucent lesion L4 vertebral body concerning for metastasis.  Incidental noted enlarged prostate.  Questionable left varicocele.    Left lower lobe pulm millimeter nodule adjacent to pulmonary artery branch.  Concerning for metastatic disease.  Few additional peripheral subcentimeter nodules measuring up to 4 mm of the left lower lobe, of unclear significance.    RECOMMENDATIONS    Management of acute coronary syndrome per cardiology.  Treatment cardiac conditions supersede need for work-up for suspected metastatic renal cancer.  Discussed with Cardiology Dr Schultz, Medicine Dr Olsen and IR Dr Mcdonough.     L4 bone biopsy with location close to aorta. With neg bone lesion and off APT only for short duration too high risk for bx today.   If pt can come off APT for brief period, potential areas of bx lung, bone or kidney.     Check skeletal survey given negative bone scan, to see if other more readily accessible sites.   Check MRI L4.     Check anemia studies.  Check type and screen.  Transfuse 1 unit PRBC, if repeat Hgb lower.   Keep Hgb >8 g/dL.    DVT Prophylaxis    Thank you for consulting us.     Discussed plan of care with patient and or family in detail.   They expressed understanding of the treatment plan.   Risks, benefits and alternatives discussed in detail.   Opportunity given for questions and discussion.   Any questions or concerns all addressed and answered to their satisfaction, and in lay terms.     Plan for DC tomorrow once imaging done.   Will need to be seen in office the following day in office to coordinate needed care.   pt is high risk for readmission, as with complicated and three high risk acute and subacute conditions.

## 2023-01-09 NOTE — PROGRESS NOTE ADULT - PROBLEM SELECTOR PLAN 3
- Likely anemia of chronic disease related to an underlying renal malignancy  - Recent hx of anemia being worked up outpt  - Pt reported UA as outpt showed proteuria and hematuria, had GI and Uro appts scheduled for next week  - Heme/onc following, recs appreciated - Likely anemia of chronic disease related to an underlying renal malignancy  - Recent hx of anemia being worked up outpt  - Pt reported UA as outpt showed proteuria and hematuria, had GI and Uro appts scheduled for next week  - 1/9 Hgb 8 - transfuse 1 unit   - Heme/onc following, recs appreciated

## 2023-01-10 ENCOUNTER — TRANSCRIPTION ENCOUNTER (OUTPATIENT)
Age: 78
End: 2023-01-10

## 2023-01-10 VITALS
OXYGEN SATURATION: 94 % | DIASTOLIC BLOOD PRESSURE: 72 MMHG | HEART RATE: 78 BPM | SYSTOLIC BLOOD PRESSURE: 150 MMHG | TEMPERATURE: 99 F | RESPIRATION RATE: 19 BRPM

## 2023-01-10 LAB
ALBUMIN SERPL ELPH-MCNC: 1.9 G/DL — LOW (ref 3.3–5)
ALP SERPL-CCNC: 109 U/L — SIGNIFICANT CHANGE UP (ref 40–120)
ALT FLD-CCNC: 139 U/L — HIGH (ref 12–78)
ANION GAP SERPL CALC-SCNC: 8 MMOL/L — SIGNIFICANT CHANGE UP (ref 5–17)
AST SERPL-CCNC: 95 U/L — HIGH (ref 15–37)
BASOPHILS # BLD AUTO: 0.04 K/UL — SIGNIFICANT CHANGE UP (ref 0–0.2)
BASOPHILS NFR BLD AUTO: 0.5 % — SIGNIFICANT CHANGE UP (ref 0–2)
BILIRUB SERPL-MCNC: 0.2 MG/DL — SIGNIFICANT CHANGE UP (ref 0.2–1.2)
BUN SERPL-MCNC: 23 MG/DL — SIGNIFICANT CHANGE UP (ref 7–23)
CALCIUM SERPL-MCNC: 9.6 MG/DL — SIGNIFICANT CHANGE UP (ref 8.5–10.1)
CHLORIDE SERPL-SCNC: 104 MMOL/L — SIGNIFICANT CHANGE UP (ref 96–108)
CO2 SERPL-SCNC: 28 MMOL/L — SIGNIFICANT CHANGE UP (ref 22–31)
CREAT SERPL-MCNC: 1 MG/DL — SIGNIFICANT CHANGE UP (ref 0.5–1.3)
EGFR: 78 ML/MIN/1.73M2 — SIGNIFICANT CHANGE UP
EOSINOPHIL # BLD AUTO: 0.08 K/UL — SIGNIFICANT CHANGE UP (ref 0–0.5)
EOSINOPHIL NFR BLD AUTO: 1 % — SIGNIFICANT CHANGE UP (ref 0–6)
GLUCOSE SERPL-MCNC: 91 MG/DL — SIGNIFICANT CHANGE UP (ref 70–99)
HCT VFR BLD CALC: 31.3 % — LOW (ref 39–50)
HGB BLD-MCNC: 9.5 G/DL — LOW (ref 13–17)
IMM GRANULOCYTES NFR BLD AUTO: 0.4 % — SIGNIFICANT CHANGE UP (ref 0–0.9)
LYMPHOCYTES # BLD AUTO: 1.42 K/UL — SIGNIFICANT CHANGE UP (ref 1–3.3)
LYMPHOCYTES # BLD AUTO: 17.5 % — SIGNIFICANT CHANGE UP (ref 13–44)
MAGNESIUM SERPL-MCNC: 2.3 MG/DL — SIGNIFICANT CHANGE UP (ref 1.6–2.6)
MCHC RBC-ENTMCNC: 24.8 PG — LOW (ref 27–34)
MCHC RBC-ENTMCNC: 30.4 GM/DL — LOW (ref 32–36)
MCV RBC AUTO: 81.7 FL — SIGNIFICANT CHANGE UP (ref 80–100)
MONOCYTES # BLD AUTO: 0.85 K/UL — SIGNIFICANT CHANGE UP (ref 0–0.9)
MONOCYTES NFR BLD AUTO: 10.5 % — SIGNIFICANT CHANGE UP (ref 2–14)
NEUTROPHILS # BLD AUTO: 5.69 K/UL — SIGNIFICANT CHANGE UP (ref 1.8–7.4)
NEUTROPHILS NFR BLD AUTO: 70.1 % — SIGNIFICANT CHANGE UP (ref 43–77)
NRBC # BLD: 0 /100 WBCS — SIGNIFICANT CHANGE UP (ref 0–0)
PHOSPHATE SERPL-MCNC: 3.2 MG/DL — SIGNIFICANT CHANGE UP (ref 2.5–4.5)
PLATELET # BLD AUTO: 523 K/UL — HIGH (ref 150–400)
POTASSIUM SERPL-MCNC: 4.4 MMOL/L — SIGNIFICANT CHANGE UP (ref 3.5–5.3)
POTASSIUM SERPL-SCNC: 4.4 MMOL/L — SIGNIFICANT CHANGE UP (ref 3.5–5.3)
PROT SERPL-MCNC: 8 G/DL — SIGNIFICANT CHANGE UP (ref 6–8.3)
RBC # BLD: 3.83 M/UL — LOW (ref 4.2–5.8)
RBC # FLD: 17.6 % — HIGH (ref 10.3–14.5)
SODIUM SERPL-SCNC: 140 MMOL/L — SIGNIFICANT CHANGE UP (ref 135–145)
WBC # BLD: 8.11 K/UL — SIGNIFICANT CHANGE UP (ref 3.8–10.5)
WBC # FLD AUTO: 8.11 K/UL — SIGNIFICANT CHANGE UP (ref 3.8–10.5)

## 2023-01-10 PROCEDURE — 71275 CT ANGIOGRAPHY CHEST: CPT | Mod: MA

## 2023-01-10 PROCEDURE — U0003: CPT

## 2023-01-10 PROCEDURE — P9016: CPT

## 2023-01-10 PROCEDURE — 93306 TTE W/DOPPLER COMPLETE: CPT

## 2023-01-10 PROCEDURE — 36415 COLL VENOUS BLD VENIPUNCTURE: CPT

## 2023-01-10 PROCEDURE — 72158 MRI LUMBAR SPINE W/O & W/DYE: CPT

## 2023-01-10 PROCEDURE — 71045 X-RAY EXAM CHEST 1 VIEW: CPT

## 2023-01-10 PROCEDURE — 85610 PROTHROMBIN TIME: CPT

## 2023-01-10 PROCEDURE — 86803 HEPATITIS C AB TEST: CPT

## 2023-01-10 PROCEDURE — 83880 ASSAY OF NATRIURETIC PEPTIDE: CPT

## 2023-01-10 PROCEDURE — 77075 RADEX OSSEOUS SURVEY COMPL: CPT | Mod: 26

## 2023-01-10 PROCEDURE — 70470 CT HEAD/BRAIN W/O & W/DYE: CPT

## 2023-01-10 PROCEDURE — 86900 BLOOD TYPING SEROLOGIC ABO: CPT

## 2023-01-10 PROCEDURE — 86901 BLOOD TYPING SEROLOGIC RH(D): CPT

## 2023-01-10 PROCEDURE — 82728 ASSAY OF FERRITIN: CPT

## 2023-01-10 PROCEDURE — 82550 ASSAY OF CK (CPK): CPT

## 2023-01-10 PROCEDURE — U0005: CPT

## 2023-01-10 PROCEDURE — 85652 RBC SED RATE AUTOMATED: CPT

## 2023-01-10 PROCEDURE — A9579: CPT

## 2023-01-10 PROCEDURE — 83550 IRON BINDING TEST: CPT

## 2023-01-10 PROCEDURE — 87635 SARS-COV-2 COVID-19 AMP PRB: CPT

## 2023-01-10 PROCEDURE — 86850 RBC ANTIBODY SCREEN: CPT

## 2023-01-10 PROCEDURE — 85018 HEMOGLOBIN: CPT

## 2023-01-10 PROCEDURE — 81001 URINALYSIS AUTO W/SCOPE: CPT

## 2023-01-10 PROCEDURE — 93005 ELECTROCARDIOGRAM TRACING: CPT

## 2023-01-10 PROCEDURE — C8929: CPT

## 2023-01-10 PROCEDURE — 86923 COMPATIBILITY TEST ELECTRIC: CPT

## 2023-01-10 PROCEDURE — 74177 CT ABD & PELVIS W/CONTRAST: CPT

## 2023-01-10 PROCEDURE — 82607 VITAMIN B-12: CPT

## 2023-01-10 PROCEDURE — 80053 COMPREHEN METABOLIC PANEL: CPT

## 2023-01-10 PROCEDURE — 85730 THROMBOPLASTIN TIME PARTIAL: CPT

## 2023-01-10 PROCEDURE — 83540 ASSAY OF IRON: CPT

## 2023-01-10 PROCEDURE — 86140 C-REACTIVE PROTEIN: CPT

## 2023-01-10 PROCEDURE — 84484 ASSAY OF TROPONIN QUANT: CPT

## 2023-01-10 PROCEDURE — 85014 HEMATOCRIT: CPT

## 2023-01-10 PROCEDURE — 85027 COMPLETE CBC AUTOMATED: CPT

## 2023-01-10 PROCEDURE — 84100 ASSAY OF PHOSPHORUS: CPT

## 2023-01-10 PROCEDURE — 83605 ASSAY OF LACTIC ACID: CPT

## 2023-01-10 PROCEDURE — 83690 ASSAY OF LIPASE: CPT

## 2023-01-10 PROCEDURE — 83010 ASSAY OF HAPTOGLOBIN QUANT: CPT

## 2023-01-10 PROCEDURE — 82553 CREATINE MB FRACTION: CPT

## 2023-01-10 PROCEDURE — 99232 SBSQ HOSP IP/OBS MODERATE 35: CPT

## 2023-01-10 PROCEDURE — 85025 COMPLETE CBC W/AUTO DIFF WBC: CPT

## 2023-01-10 PROCEDURE — 99239 HOSP IP/OBS DSCHRG MGMT >30: CPT

## 2023-01-10 PROCEDURE — 83615 LACTATE (LD) (LDH) ENZYME: CPT

## 2023-01-10 PROCEDURE — 99285 EMERGENCY DEPT VISIT HI MDM: CPT

## 2023-01-10 PROCEDURE — 82746 ASSAY OF FOLIC ACID SERUM: CPT

## 2023-01-10 PROCEDURE — 87040 BLOOD CULTURE FOR BACTERIA: CPT

## 2023-01-10 PROCEDURE — 36430 TRANSFUSION BLD/BLD COMPNT: CPT

## 2023-01-10 PROCEDURE — 87086 URINE CULTURE/COLONY COUNT: CPT

## 2023-01-10 PROCEDURE — 83735 ASSAY OF MAGNESIUM: CPT

## 2023-01-10 PROCEDURE — 77075 RADEX OSSEOUS SURVEY COMPL: CPT

## 2023-01-10 RX ADMIN — LISINOPRIL 20 MILLIGRAM(S): 2.5 TABLET ORAL at 05:55

## 2023-01-10 RX ADMIN — AMLODIPINE BESYLATE 10 MILLIGRAM(S): 2.5 TABLET ORAL at 05:55

## 2023-01-10 RX ADMIN — CLOPIDOGREL BISULFATE 75 MILLIGRAM(S): 75 TABLET, FILM COATED ORAL at 11:45

## 2023-01-10 RX ADMIN — Medication 200 MILLIGRAM(S): at 05:55

## 2023-01-10 RX ADMIN — EZETIMIBE 10 MILLIGRAM(S): 10 TABLET ORAL at 11:46

## 2023-01-10 RX ADMIN — ISOSORBIDE MONONITRATE 30 MILLIGRAM(S): 60 TABLET, EXTENDED RELEASE ORAL at 11:46

## 2023-01-10 RX ADMIN — Medication 81 MILLIGRAM(S): at 11:46

## 2023-01-10 NOTE — PROGRESS NOTE ADULT - PROBLEM SELECTOR PLAN 2
Chest pain likely ACS event, although unlikely significant infarct per Cardio  - S/p Heparin gtt for 48hrs  - Continue ASA, Imdur, Metoprolol  - Hold Plavix 5 days before biopsy  - Hold home Atorvastatin in the setting of transaminitis  - Echo: EF 55-60%  - Cardiology ([facility]) following, recs appreciated

## 2023-01-10 NOTE — PROGRESS NOTE ADULT - SUBJECTIVE AND OBJECTIVE BOX
Patient is a 77y old  Male who presents with a chief complaint of chest pain, r/o ACS (09 Jan 2023 17:17)      INTERVAL HPI/OVERNIGHT EVENTS: No acute events overnight. Patient seen and examined at bedside. Denies night sweats last night. Further denies fever/chills, chest pain, shortness of breath, headache, dizziness, nausea/vomiting, abdominal pain.     MEDICATIONS  (STANDING):  amLODIPine   Tablet 10 milliGRAM(s) Oral daily  aspirin  chewable 81 milliGRAM(s) Oral daily  clopidogrel Tablet 75 milliGRAM(s) Oral daily  ezetimibe 10 milliGRAM(s) Oral daily  influenza  Vaccine (HIGH DOSE) 0.7 milliLiter(s) IntraMuscular once  isosorbide   mononitrate ER Tablet (IMDUR) 30 milliGRAM(s) Oral daily  lisinopril 20 milliGRAM(s) Oral daily  metoprolol succinate  milliGRAM(s) Oral daily    MEDICATIONS  (PRN):      Allergies    No Known Allergies    Intolerances        REVIEW OF SYSTEMS:  CONSTITUTIONAL: No fever or chills  HEENT:  No headache, no sore throat  RESPIRATORY: No cough, wheezing, or shortness of breath  CARDIOVASCULAR: No chest pain, palpitations  GASTROINTESTINAL: No abd pain, nausea, vomiting, or diarrhea  GENITOURINARY: No dysuria, frequency, or hematuria  NEUROLOGICAL: no focal weakness or dizziness  MUSCULOSKELETAL: no myalgias     Vital Signs Last 24 Hrs  T(C): 37.1 (10 Tian 2023 04:44), Max: 37.1 (09 Jan 2023 20:50)  T(F): 98.8 (10 Tian 2023 04:44), Max: 98.8 (09 Jan 2023 20:50)  HR: 86 (10 Tian 2023 04:44) (78 - 86)  BP: 133/77 (10 Tian 2023 04:44) (119/75 - 138/74)  BP(mean): --  RR: 18 (10 Tian 2023 04:44) (17 - 18)  SpO2: 92% (10 Tian 2023 04:44) (92% - 98%)    Parameters below as of 10 Tian 2023 04:44  Patient On (Oxygen Delivery Method): room air        PHYSICAL EXAM:  GENERAL: NAD  HEENT:  anicteric, moist mucous membranes  CHEST/LUNG:  CTA b/l, no rales, wheezes, or rhonchi  HEART:  RRR, S1, S2  ABDOMEN:  BS+, soft, nontender, nondistended  EXTREMITIES: no edema, cyanosis, or calf tenderness  NERVOUS SYSTEM: answers questions and follows commands appropriately    LABS:                        9.5    8.11  )-----------( 523      ( 10 Tian 2023 06:53 )             31.3     CBC Full  -  ( 10 Tian 2023 06:53 )  WBC Count : 8.11 K/uL  Hemoglobin : 9.5 g/dL  Hematocrit : 31.3 %  Platelet Count - Automated : 523 K/uL  Mean Cell Volume : 81.7 fl  Mean Cell Hemoglobin : 24.8 pg  Mean Cell Hemoglobin Concentration : 30.4 gm/dL  Auto Neutrophil # : 5.69 K/uL  Auto Lymphocyte # : 1.42 K/uL  Auto Monocyte # : 0.85 K/uL  Auto Eosinophil # : 0.08 K/uL  Auto Basophil # : 0.04 K/uL  Auto Neutrophil % : 70.1 %  Auto Lymphocyte % : 17.5 %  Auto Monocyte % : 10.5 %  Auto Eosinophil % : 1.0 %  Auto Basophil % : 0.5 %    10 Tian 2023 06:53    140    |  104    |  23     ----------------------------<  91     4.4     |  28     |  1.00     Ca    9.6        10 Tian 2023 06:53  Phos  3.2       10 Tian 2023 06:53  Mg     2.3       10 Tian 2023 06:53    TPro  8.0    /  Alb  1.9    /  TBili  0.2    /  DBili  x      /  AST  95     /  ALT  139    /  AlkPhos  109    10 Tian 2023 06:53        CAPILLARY BLOOD GLUCOSE            Culture - Urine (collected 01-08-23 @ 03:00)  Source: Clean Catch Clean Catch (Midstream)  Final Report (01-09-23 @ 14:59):    10,000 - 49,000 CFU/mL Coag Negative Staphylococcus "Susceptibilities not    performed"    Culture - Blood (collected 01-08-23 @ 02:50)  Source: .Blood Blood-Peripheral  Preliminary Report (01-09-23 @ 12:02):    No growth to date.    Culture - Blood (collected 01-08-23 @ 02:49)  Source: .Blood Blood-Peripheral  Preliminary Report (01-09-23 @ 12:02):    No growth to date.        RADIOLOGY & ADDITIONAL TESTS:    Personally reviewed.     Consultant(s) Notes Reviewed:  [x] YES  [ ] NO

## 2023-01-10 NOTE — PROGRESS NOTE ADULT - ASSESSMENT
anemia  chest pain  abnormal CT     reg diet  CT scan noted  elevated lfts; liver normal on CT   cbc daily    I reviewed the overnight course of events on the unit, re-confirming the patient history. I discussed the care with the patient and their family  The plan of care was discussed with the physician assistant and modifications were made to the notation where appropriate.   Differential diagnosis and plan of care discussed with patient after the evaluation  35 minutes spent on total encounter of which more than fifty percent of the encounter was spent counseling and/or coordinating care by the attending physician.  Advanced care planning was discussed with patient and family.  Advanced care planning forms were reviewed and discussed.  Risks, benefits and alternatives of gastroenterologic procedures were discussed in detail and all questions were answered.

## 2023-01-10 NOTE — PROGRESS NOTE ADULT - ASSESSMENT
77 year old male with history of CABG in 1995, S/P stent x1 placement in 2008, HTN, hyperlipidemia, presented to the ED with the c/o sudden onset of left-sided chest pain admitted for new LBBB and rising trops     Hx CAD s/p CABG and stent, NSTEMI, HTN   - P/W chest pain, +elevated hTrops, and LBBB.  hsT peaked and downtrended.  No need to trend  - s/p hep gtt x 48 hour, ACS nomofram.  Continue ASA, Plavix, ACEI, Imdur, and statin.    - No anginal complaints.  - Has had 15 beats NSVT and a 3.6 sec pause, 1/8.  No further noted.  Can D/C tele  - Continue current dose of BB     - EKG shows new LBBB when compared to last EKG down as out patient on 12/24/22, repeat EKG, NSR   - TTE: normal LV 55-60% hypokinesis of mid to basal inferior wall mild mr, trace tr     - CT C/A/P - Left ventricular aneurysm, Lt renal neoplasm, followed by hem onc   - Bone Bx cancelled.  To follow closely with Heme outpatient    - BP well controlled, monitor routine hemodynamics    - Monitor and replete Lytes. Keep K > 4 and Mg > 2  - Will continue to follow.  If no further inpatient w/u needed, no contraindication for D/C from cardiac standpoint.  To follow up with Dr. Schutlz for outpatient ischemic eval.  For now, will continue medical management    Kathy Doll DNP, NP-C  Cardiology   Spectra #[phone]

## 2023-01-10 NOTE — PROGRESS NOTE ADULT - REASON FOR ADMISSION
chest pain, r/o ACS

## 2023-01-10 NOTE — PROGRESS NOTE ADULT - PROBLEM SELECTOR PROBLEM 2
Chest pain
Imaging abnormality
Chest pain

## 2023-01-10 NOTE — DISCHARGE NOTE NURSING/CASE MANAGEMENT/SOCIAL WORK - NSDCPEFALRISK_GEN_ALL_CORE
For information on Fall & Injury Prevention, visit: [email or web address] OR  [email or web address] OR  https://www.cdc.gov/steadi/patient.html

## 2023-01-10 NOTE — PROGRESS NOTE ADULT - PROBLEM SELECTOR PLAN 7
Former smoker, Pt quit ~1 month ago  - at this time not requesting nicotine replacement
Former smoker, Pt quit ~1 month ago  - at this time not requesting nicotine replacement  - tobacco use would predispose pt to increased risk of renal cancer
pt quit about one month ago  - at this time not requesting nicotine replacement  - tob use would predispose pt to increased risk of renal cancer
pt quit about one month ago  - at this time not requesting nicotine replacement  - tob use would predispose pt to increased risk of renal cancer
Former smoker, Pt quit ~1 month ago  - at this time not requesting nicotine replacement  - tobacco use would predispose pt to increased risk of renal cancer
pt quit about one month ago  - at this time not requesting nicotine replacement  - tob use would predispose pt to increased risk of renal cancer

## 2023-01-10 NOTE — PROGRESS NOTE ADULT - SUBJECTIVE AND OBJECTIVE BOX
[facility]  Vivek Cole PA-C  [address] [place] [address]  [phone]      INTERVAL HPI/OVERNIGHT EVENTS:  Pt s/e  Reports no new GI complaints  Denies overt GI bleeding    MEDICATIONS  (STANDING):  amLODIPine   Tablet 10 milliGRAM(s) Oral daily  aspirin  chewable 81 milliGRAM(s) Oral daily  dextrose 5% + sodium chloride 0.9%. 1000 milliLiter(s) (50 mL/Hr) IV Continuous <Continuous>  ezetimibe 10 milliGRAM(s) Oral daily  influenza  Vaccine (HIGH DOSE) 0.7 milliLiter(s) IntraMuscular once  isosorbide   mononitrate ER Tablet (IMDUR) 30 milliGRAM(s) Oral daily  lisinopril 20 milliGRAM(s) Oral daily  metoprolol succinate  milliGRAM(s) Oral daily    MEDICATIONS  (PRN):  LORazepam   Injectable 0.25 milliGRAM(s) IV Push once PRN Anxiety      Allergies    No Known Allergies      PHYSICAL EXAM:   Vital Signs Last 24 Hrs  T(C): 37 (10 Tian 2023 11:57), Max: 37.1 (09 Jan 2023 20:50)  T(F): 98.6 (10 Tian 2023 11:57), Max: 98.8 (09 Jan 2023 20:50)  HR: 78 (10 Tian 2023 11:57) (78 - 86)  BP: 150/72 (10 Tian 2023 11:57) (119/75 - 150/72)  BP(mean): --  RR: 19 (10 Tian 2023 11:57) (17 - 19)  SpO2: 94% (10 Tian 2023 11:57) (92% - 98%)    Parameters below as of 10 Tian 2023 11:57  Patient On (Oxygen Delivery Method): room air    GENERAL:  Appears stated age  ABDOMEN:  Soft, non-tender, non-distended  NEURO:  Alert      LABS:                           9.5    8.11  )-----------( 523      ( 10 Tian 2023 06:53 )             31.3     01-10    140  |  104  |  23  ----------------------------<  91  4.4   |  28  |  1.00    Ca    9.6      10 Tian 2023 06:53  Phos  3.2     01-10  Mg     2.3     01-10    TPro  8.0  /  Alb  1.9<L>  /  TBili  0.2  /  DBili  x   /  AST  95<H>  /  ALT  139<H>  /  AlkPhos  109  01-10

## 2023-01-10 NOTE — PROGRESS NOTE ADULT - SUBJECTIVE AND OBJECTIVE BOX
[facility] -- Corine Smith Patel, [place], Goodger  Office # [phone]    Follow Up:   Hx CAD s/p CABG and stent, CP, NSTEMI    Subjective/Observations:     REVIEW OF SYSTEMS: All other review of systems is negative unless indicated above  PAST MEDICAL & SURGICAL HISTORY:  HTN - Hypertension  H/O hyperlipidemia  CAD, multiple vessel  Renal colic  Hypertension  S/P CABG x 3  1995  S/P coronary artery stent placement  2 stents - 3 years ago  S/P knee surgery  arthroscopic b/l  S/P hernia repair  S/P CABG x 4  S/P coronary artery stent placement    MEDICATIONS  (STANDING):  amLODIPine   Tablet 10 milliGRAM(s) Oral daily  aspirin  chewable 81 milliGRAM(s) Oral daily  clopidogrel Tablet 75 milliGRAM(s) Oral daily  ezetimibe 10 milliGRAM(s) Oral daily  influenza  Vaccine (HIGH DOSE) 0.7 milliLiter(s) IntraMuscular once  isosorbide   mononitrate ER Tablet (IMDUR) 30 milliGRAM(s) Oral daily  lisinopril 20 milliGRAM(s) Oral daily  metoprolol succinate  milliGRAM(s) Oral daily    MEDICATIONS  (PRN):    Allergies    No Known Allergies    Intolerances    Vital Signs Last 24 Hrs  T(C): 37 (10 Tian 2023 11:57), Max: 37.1 (09 Jan 2023 20:50)  T(F): 98.6 (10 Tian 2023 11:57), Max: 98.8 (09 Jan 2023 20:50)  HR: 78 (10 Tian 2023 11:57) (78 - 86)  BP: 150/72 (10 Tian 2023 11:57) (119/75 - 150/72)  BP(mean): --  RR: 19 (10 Tian 2023 11:57) (17 - 19)  SpO2: 94% (10 Tian 2023 11:57) (92% - 98%)    Parameters below as of 10 Tian 2023 11:57  Patient On (Oxygen Delivery Method): room air      I&O's Summary    09 Jan 2023 07:01  -  10 Tian 2023 07:00  --------------------------------------------------------  IN: 0 mL / OUT: 200 mL / NET: -200 mL    PHYSICAL EXAM:  TELE: NSR/Stach  Constitutional: NAD, awake and alert, well-developed  HEENT: Moist Mucous Membranes, Anicteric  Pulmonary: Non-labored, breath sounds are clear bilaterally, No wheezing, rales or rhonchi  Cardiovascular: Regular, S1 and S2, No murmurs, rubs, gallops or clicks  Gastrointestinal: Bowel Sounds present, soft, nontender.   Lymph: No peripheral edema. No lymphadenopathy.  Skin: No visible rashes or ulcers.  Psych:  Mood & affect appropriate    LABS: All Labs Reviewed:                        9.5    8.11  )-----------( 523      ( 10 Tian 2023 06:53 )             31.3                         8.3    x     )-----------( x        ( 09 Jan 2023 19:30 )             x                            8.0    8.90  )-----------( 509      ( 09 Jan 2023 06:42 )             26.9     10 Tian 2023 06:53    140    |  104    |  23     ----------------------------<  91     4.4     |  28     |  1.00   09 Jan 2023 06:42    139    |  106    |  27     ----------------------------<  160    4.5     |  25     |  1.10   08 Jan 2023 08:02    139    |  105    |  28     ----------------------------<  154    4.6     |  25     |  1.20     Ca    9.6        10 Tian 2023 06:53  Ca    9.4        09 Jan 2023 06:42  Ca    10.0       08 Jan 2023 08:02  Phos  3.2       10 Tian 2023 06:53  Phos  3.6       09 Jan 2023 06:42  Phos  4.4       08 Jan 2023 08:02  Mg     2.3       10 Tian 2023 06:53  Mg     2.4       09 Jan 2023 06:42  Mg     2.6       08 Jan 2023 08:02    TPro  8.0    /  Alb  1.9    /  TBili  0.2    /  DBili  x      /  AST  95     /  ALT  139    /  AlkPhos  109    10 Jan 2023 06:53  TPro  8.0    /  Alb  1.8    /  TBili  0.2    /  DBili  x      /  AST  123    /  ALT  158    /  AlkPhos  116    09 Jan 2023 06:42  TPro  8.7    /  Alb  1.9    /  TBili  0.2    /  DBili  x      /  AST  77     /  ALT  110    /  AlkPhos  129    08 Jan 2023 08:02      ACC: [id number] EXAM:  ECHO TTE WO CON COMP W DOPP                          PROCEDURE DATE:  01/05/2023          INTERPRETATION:  INDICATION: Chest pain  Sonographer PH    Blood Pressure 136/72    Height 170 cm     Weight 75 kg    Dimensions:  LA 3.8      Normal Values: 2.0 - 4.0 cm  Ao 3.2        Normal Values: 2.0 - 3.8 cm  SEPTUM 0.9       Normal Values: 0.6 - 1.2 cm  PWT 0.8       Normal Values: 0.6 - 1.1 cm  LVIDd 4.0         Normal Values: 3.0 - 5.6 cm  LVIDs 2.7         Normal Values: 1.8- 4.0 cm    OBSERVATIONS:  Mitral Valve: Mitral annular calcification with thickened leaflets. Mild   MR.  Aortic Valve/Aorta: Calcified trileaflet aortic valve with grossly normal   opening.  Tricuspid Valve: normal with trace TR.  Pulmonic Valve:Not well-visualized  Left Atrium: normal  Right Atrium: Not well-visualized  Left Ventricle: normal LV size and systolic function, estimated LVEF of   55-60%. There is hypokinesis of the mid to basal inferior wall  Right Ventricle: Grossly normal size and systolic function.  Pericardium: no significant pericardial effusion.    IMPRESSION:  normal LV size and systolic function, estimated LVEF of 55-60%. There is   hypokinesis of the mid to basal inferior wall  Grossly normal RV size and systolic function.  Calcified trileaflet aortic valve with grossly normal opening, without AI.  Mild MR  Trace TR.  No significant pericardial effusion.    --- End of Report ---    KIMBERLY GODFREY MD; Attending Cardiologist  This document has been electronically signed. Jan 6 2023 11:06AM    ACC: [id number] EXAM:  CT ABDOMEN AND PELVIS IC                        ACC: [id number] EXAM:  CT ANGIO CHEST PULM ART [facility]                          PROCEDURE DATE:  01/04/2023      INTERPRETATION:  CLINICAL INFORMATION: Weakness, chest pain    COMPARISON: Report of prior CT from 2002. Images are not available.    CONTRAST/COMPLICATIONS:  IV Contrast: Omnipaque 350 (accession [id number]), IV contrast documented   in unlinked concurrent exam (accession [id number])  74 cc administered   (accession [id number]), 0 cc administered (accession [id number])   26 cc   discarded (accession [id number]), 0 cc discarded (accession [id number])  Oral Contrast: NONE  Complications: None reported at time of study completion    PROCEDURE:  CT Angiography of the Chest was performed followed by portal venous phase   imaging of the Abdomen and Pelvis.  Sagittal and coronal reformats were performed as well as 3D (MIP)   reconstructions.    FINDINGS:    CHEST:  LUNGS AND LARGE AIRWAYS: Singulair as are patent. No large focal   consolidation. Minimal scattered groundglass. Left lower lobe 12 mm   nodule image 65 series 2 adjacent to pulmonary artery branch. This is   concerning for metastatic disease. Correlate with PET/CT. Few additional   peripheral subcentimeter nodules measuring up to 4 mm of the left lower   lobe on image 69 series 2, of unclear significance.  PLEURA: No pleural effusion or pneumothorax  VESSELS: No pulmonary embolus. Main pulmonary artery size is within   normal limits.  HEART: Heart size is mildly enlarged, post CABG. LV apical thinning with   1.7 x 0.6 cm aneurysm versus diverticulum, image 76 series 2. Correlate   with echocardiogram. No pericardial effusion.  MEDIASTINUM AND DARA: Small volume lymph nodes. Esophagus is nondistended.  CHEST WALL AND LOWER NECK: No chest wall hematoma. Visualized thyroid is   suboptimally assessed due to streak artifact.    ABDOMEN AND PELVIS:  LIVER: Liver size within normal limits  BILE DUCTS: No distention  GALLBLADDER: Probable fundal adenomyomatosis  SPLEEN: Spleen size borderline enlarged, 13 cm in craniocaudal dimension  PANCREAS: No acute peripancreatic inflammation  ADRENALS: Unremarkable adrenals  KIDNEYS/URETERS: Left renal large heterogeneous hypervascular mass   concerning for renalcell neoplasm, measuring 11.1 x 9.6 x 12.4 cm with   multiple areas of internal necrosis and numerous parasitic surrounding   vessels. The mass is partially endophytic and mostly exophytic. No   hydronephrosis of the kidneys. Additional left renal cyst. Subcentimeter   hypodensities too small to characterize.    BLADDER: Underdistended  REPRODUCTIVE ORGANS: Prostate is enlarged. Question left varicocele.    BOWEL: Limited evaluation due to lack of oral contrast. Stomach is   underdistended. No small bowel distention. Appendix is unremarkable.   Mild-to-moderate apical limits evaluation of the colonic mucosa.   Left-sided large renal mass abuts the left-sided colon and demonstrates   mass effect on the mesentery.  PERITONEUM: No ascites  VESSELS: No abdominal aortic aneurysm. Aortoiliac atheromatous changes.  RETROPERITONEUM/LYMPH NODES: Small volume nodes.  ABDOMINAL WALL: Right posterior gluteal superficial soft tissue nodule   measures 2.4 cm. A similar finding was reported on prior CT from 2002,   though no images are available for comparison. If this finding is stable   over a long period time, sebaceous cyst is considered.  BONES: Also, 1.5 x 1.4 cm lucent lesion of L4 vertebral body concerning   for metastatic disease. Additional nonspecific heterogeneity of the   pelvis. Recommend bone scan versus MRI for further evaluation. Multilevel   degenerative changes of the bones. Median sternotomy.    IMPRESSION:    No pulmonary embolus or large focal consolidation.    Post CABG. LV apical thinning with 1.7 x 0.6 cm aneurysm versus   diverticulum. Small aortic valve calcification. Correlate with   echocardiogram. Dr. Garnett discussed these findings with Dr. Wilmer Henderson on 1/4/2023 at 11:54AM, with read back.    Left renal large heterogeneous hypervascular mass concerning for renal   cell neoplasm, measuring 11.1 x 9.6 x 12.4 cm. Left lower lobe pulmonary   nodule measuring 1.2 cm adjacent to a pulmonary artery branch, concerning   metastatic disease. PET/CT and oncologyevaluation are recommended for   further evaluation. Dr. Garnett discussed these findings with Dr. Wilmer Henderson on 1/4/2023 at 11:54AM, with read back.    Also, 1.5 x 1.4 cm lucent lesion of L4 vertebral body concerning for   metastatic disease. Additional nonspecific heterogeneity of the pelvis.   Recommend bone scan versus MRI for further evaluation.    --- End of Report ---    DERRICK GARNETT M.D., ATTENDING RADIOLOGIST  This document has been electronically signed. Jan 4 2023 12:09PM    Ventricular Rate 84 BPM    Atrial Rate 84 BPM    P-R Interval 182 ms    QRS Duration 106 ms    Q-T Interval 370 ms    QTC Calculation(Bazett) 437 ms    P Axis 57 degrees    R Axis 34 degrees    T Axis 91 degrees    Diagnosis Line Normal sinus rhythm  Inferior infarct (cited on or before 04-JAN-2023)  Anterior infarct (cited on or before 04-JAN-2023)  Abnormal ECG    Confirmed by tabatha Schultz ([id number]) on 1/8/2023 12:18:58 PM              [facility] -- Corine Smith Patel, [place]ella, Goodger  Office # [phone]    Follow Up:   Hx CAD s/p CABG and stent, CP, NSTEMI    Subjective/Observations: Patient seen and examined. Events noted. Resting comfortably in bed. No complaints of chest pain, dyspnea, or palpitations reported. No signs of orthopnea or PND.     REVIEW OF SYSTEMS: All other review of systems is negative unless indicated above  PAST MEDICAL & SURGICAL HISTORY:  HTN - Hypertension  H/O hyperlipidemia  CAD, multiple vessel  Renal colic  Hypertension  S/P CABG x 3  1995  S/P coronary artery stent placement  2 stents - 3 years ago  S/P knee surgery  arthroscopic b/l  S/P hernia repair  S/P CABG x 4  S/P coronary artery stent placement    MEDICATIONS  (STANDING):  amLODIPine   Tablet 10 milliGRAM(s) Oral daily  aspirin  chewable 81 milliGRAM(s) Oral daily  clopidogrel Tablet 75 milliGRAM(s) Oral daily  ezetimibe 10 milliGRAM(s) Oral daily  influenza  Vaccine (HIGH DOSE) 0.7 milliLiter(s) IntraMuscular once  isosorbide   mononitrate ER Tablet (IMDUR) 30 milliGRAM(s) Oral daily  lisinopril 20 milliGRAM(s) Oral daily  metoprolol succinate  milliGRAM(s) Oral daily    MEDICATIONS  (PRN):    Allergies    No Known Allergies    Intolerances    Vital Signs Last 24 Hrs  T(C): 37 (10 Tian 2023 11:57), Max: 37.1 (09 Jan 2023 20:50)  T(F): 98.6 (10 Tian 2023 11:57), Max: 98.8 (09 Jan 2023 20:50)  HR: 78 (10 Tian 2023 11:57) (78 - 86)  BP: 150/72 (10 Tian 2023 11:57) (119/75 - 150/72)  BP(mean): --  RR: 19 (10 Tian 2023 11:57) (17 - 19)  SpO2: 94% (10 Tian 2023 11:57) (92% - 98%)    Parameters below as of 10 Tian 2023 11:57  Patient On (Oxygen Delivery Method): room air      I&O's Summary    09 Jan 2023 07:01  -  10 Tian 2023 07:00  --------------------------------------------------------  IN: 0 mL / OUT: 200 mL / NET: -200 mL    PHYSICAL EXAM:  TELE: NSR/Stach  Constitutional: NAD, awake and alert, well-developed  HEENT: Moist Mucous Membranes, Anicteric  Pulmonary: Non-labored, breath sounds are clear bilaterally, No wheezing, rales or rhonchi  Cardiovascular: Regular, S1 and S2, No murmurs, rubs, gallops or clicks  Gastrointestinal: Bowel Sounds present, soft, nontender.   Lymph: No peripheral edema. No lymphadenopathy.  Skin: No visible rashes or ulcers.  Psych:  Mood & affect appropriate    LABS: All Labs Reviewed:                        9.5    8.11  )-----------( 523      ( 10 Tian 2023 06:53 )             31.3                         8.3    x     )-----------( x        ( 09 Jan 2023 19:30 )             x                            8.0    8.90  )-----------( 509      ( 09 Jan 2023 06:42 )             26.9     10 Tian 2023 06:53    140    |  104    |  23     ----------------------------<  91     4.4     |  28     |  1.00   09 Jan 2023 06:42    139    |  106    |  27     ----------------------------<  160    4.5     |  25     |  1.10   08 Jan 2023 08:02    139    |  105    |  28     ----------------------------<  154    4.6     |  25     |  1.20     Ca    9.6        10 Tian 2023 06:53  Ca    9.4        09 Jan 2023 06:42  Ca    10.0       08 Jan 2023 08:02  Phos  3.2       10 Tian 2023 06:53  Phos  3.6       09 Jan 2023 06:42  Phos  4.4       08 Jan 2023 08:02  Mg     2.3       10 Tian 2023 06:53  Mg     2.4       09 Jan 2023 06:42  Mg     2.6       08 Jan 2023 08:02    TPro  8.0    /  Alb  1.9    /  TBili  0.2    /  DBili  x      /  AST  95     /  ALT  139    /  AlkPhos  109    10 Tian 2023 06:53  TPro  8.0    /  Alb  1.8    /  TBili  0.2    /  DBili  x      /  AST  123    /  ALT  158    /  AlkPhos  116    09 Jan 2023 06:42  TPro  8.7    /  Alb  1.9    /  TBili  0.2    /  DBili  x      /  AST  77     /  ALT  110    /  AlkPhos  129    08 Jan 2023 08:02      ACC: [id number] EXAM:  ECHO TTE WO CON COMP W DOPP                          PROCEDURE DATE:  01/05/2023          INTERPRETATION:  INDICATION: Chest pain  Sonographer PH    Blood Pressure 136/72    Height 170 cm     Weight 75 kg    Dimensions:  LA 3.8      Normal Values: 2.0 - 4.0 cm  Ao 3.2        Normal Values: 2.0 - 3.8 cm  SEPTUM 0.9       Normal Values: 0.6 - 1.2 cm  PWT 0.8       Normal Values: 0.6 - 1.1 cm  LVIDd 4.0         Normal Values: 3.0 - 5.6 cm  LVIDs 2.7         Normal Values: 1.8- 4.0 cm    OBSERVATIONS:  Mitral Valve: Mitral annular calcification with thickened leaflets. Mild   MR.  Aortic Valve/Aorta: Calcified trileaflet aortic valve with grossly normal   opening.  Tricuspid Valve: normal with trace TR.  Pulmonic Valve:Not well-visualized  Left Atrium: normal  Right Atrium: Not well-visualized  Left Ventricle: normal LV size and systolic function, estimated LVEF of   55-60%. There is hypokinesis of the mid to basal inferior wall  Right Ventricle: Grossly normal size and systolic function.  Pericardium: no significant pericardial effusion.    IMPRESSION:  normal LV size and systolic function, estimated LVEF of 55-60%. There is   hypokinesis of the mid to basal inferior wall  Grossly normal RV size and systolic function.  Calcified trileaflet aortic valve with grossly normal opening, without AI.  Mild MR  Trace TR.  No significant pericardial effusion.    --- End of Report ---    KIMBERLY GODFREY MD; Attending Cardiologist  This document has been electronically signed. Jan 6 2023 11:06AM    ACC: [id number] EXAM:  CT ABDOMEN AND PELVIS IC                        ACC: [id number] EXAM:  CT ANGIO CHEST PULM ART [facility]                          PROCEDURE DATE:  01/04/2023      INTERPRETATION:  CLINICAL INFORMATION: Weakness, chest pain    COMPARISON: Report of prior CT from 2002. Images are not available.    CONTRAST/COMPLICATIONS:  IV Contrast: Omnipaque 350 (accession [id number]), IV contrast documented   in unlinked concurrent exam (accession [id number])  74 cc administered   (accession [id number]), 0 cc administered (accession [id number])   26 cc   discarded (accession [id number]), 0 cc discarded (accession [id number])  Oral Contrast: NONE  Complications: None reported at time of study completion    PROCEDURE:  CT Angiography of the Chest was performed followed by portal venous phase   imaging of the Abdomen and Pelvis.  Sagittal and coronal reformats were performed as well as 3D (MIP)   reconstructions.    FINDINGS:    CHEST:  LUNGS AND LARGE AIRWAYS: Singulair as are patent. No large focal   consolidation. Minimal scattered groundglass. Left lower lobe 12 mm   nodule image 65 series 2 adjacent to pulmonary artery branch. This is   concerning for metastatic disease. Correlate with PET/CT. Few additional   peripheral subcentimeter nodules measuring up to 4 mm of the left lower   lobe on image 69 series 2, of unclear significance.  PLEURA: No pleural effusion or pneumothorax  VESSELS: No pulmonary embolus. Main pulmonary artery size is within   normal limits.  HEART: Heart size is mildly enlarged, post CABG. LV apical thinning with   1.7 x 0.6 cm aneurysm versus diverticulum, image 76 series 2. Correlate   with echocardiogram. No pericardial effusion.  MEDIASTINUM AND DARA: Small volume lymph nodes. Esophagus is nondistended.  CHEST WALL AND LOWER NECK: No chest wall hematoma. Visualized thyroid is   suboptimally assessed due to streak artifact.    ABDOMEN AND PELVIS:  LIVER: Liver size within normal limits  BILE DUCTS: No distention  GALLBLADDER: Probable fundal adenomyomatosis  SPLEEN: Spleen size borderline enlarged, 13 cm in craniocaudal dimension  PANCREAS: No acute peripancreatic inflammation  ADRENALS: Unremarkable adrenals  KIDNEYS/URETERS: Left renal large heterogeneous hypervascular mass   concerning for renalcell neoplasm, measuring 11.1 x 9.6 x 12.4 cm with   multiple areas of internal necrosis and numerous parasitic surrounding   vessels. The mass is partially endophytic and mostly exophytic. No   hydronephrosis of the kidneys. Additional left renal cyst. Subcentimeter   hypodensities too small to characterize.    BLADDER: Underdistended  REPRODUCTIVE ORGANS: Prostate is enlarged. Question left varicocele.    BOWEL: Limited evaluation due to lack of oral contrast. Stomach is   underdistended. No small bowel distention. Appendix is unremarkable.   Mild-to-moderate apical limits evaluation of the colonic mucosa.   Left-sided large renal mass abuts the left-sided colon and demonstrates   mass effect on the mesentery.  PERITONEUM: No ascites  VESSELS: No abdominal aortic aneurysm. Aortoiliac atheromatous changes.  RETROPERITONEUM/LYMPH NODES: Small volume nodes.  ABDOMINAL WALL: Right posterior gluteal superficial soft tissue nodule   measures 2.4 cm. A similar finding was reported on prior CT from 2002,   though no images are available for comparison. If this finding is stable   over a long period time, sebaceous cyst is considered.  BONES: Also, 1.5 x 1.4 cm lucent lesion of L4 vertebral body concerning   for metastatic disease. Additional nonspecific heterogeneity of the   pelvis. Recommend bone scan versus MRI for further evaluation. Multilevel   degenerative changes of the bones. Median sternotomy.    IMPRESSION:    No pulmonary embolus or large focal consolidation.    Post CABG. LV apical thinning with 1.7 x 0.6 cm aneurysm versus   diverticulum. Small aortic valve calcification. Correlate with   echocardiogram. Dr. Garnett discussed these findings with Dr. Wilmer Henderson on 1/4/2023 at 11:54AM, with read back.    Left renal large heterogeneous hypervascular mass concerning for renal   cell neoplasm, measuring 11.1 x 9.6 x 12.4 cm. Left lower lobe pulmonary   nodule measuring 1.2 cm adjacent to a pulmonary artery branch, concerning   metastatic disease. PET/CT and oncologyevaluation are recommended for   further evaluation. Dr. Garnett discussed these findings with Dr. Wilmer Henderson on 1/4/2023 at 11:54AM, with read back.    Also, 1.5 x 1.4 cm lucent lesion of L4 vertebral body concerning for   metastatic disease. Additional nonspecific heterogeneity of the pelvis.   Recommend bone scan versus MRI for further evaluation.    --- End of Report ---    DERRICK GARNETT M.D., ATTENDING RADIOLOGIST  This document has been electronically signed. Jan 4 2023 12:09PM    Ventricular Rate 84 BPM    Atrial Rate 84 BPM    P-R Interval 182 ms    QRS Duration 106 ms    Q-T Interval 370 ms    QTC Calculation(Bazett) 437 ms    P Axis 57 degrees    R Axis 34 degrees    T Axis 91 degrees    Diagnosis Line Normal sinus rhythm  Inferior infarct (cited on or before 04-JAN-2023)  Anterior infarct (cited on or before 04-JAN-2023)  Abnormal ECG    Confirmed by tabatha Schultz ([id number]) on 1/8/2023 12:18:58 PM

## 2023-01-10 NOTE — PROGRESS NOTE ADULT - PROVIDER SPECIALTY LIST ADULT
Cardiology
Gastroenterology
Gastroenterology
Cardiology
Gastroenterology
Gastroenterology
Heme/Onc
Heme/Onc
Hospitalist
Cardiology
Gastroenterology
Gastroenterology
Heme/Onc
Heme/Onc
Hospitalist

## 2023-01-10 NOTE — PROGRESS NOTE ADULT - NS ATTEND AMEND GEN_ALL_CORE FT
OK for bone biopsy today.  Then d/c planning for outpatient cardiac evaluation.  To follow while admitted.
cont bb. No signs of significant ischemia or volume overload. Further cardiac workup will depend on clinical course. DC planning per primary team.
no further cp  trops significant but his cpk and mb are low and overall I am not susp for a sig infarct  renal mass with apparent metastases make revasc impossible as he will need procedures, so it is best to manage whatever sort of acute coronary event this may be in a conservative fashion  tolerating heparin gtt  would not plan on cath  hgb stable  asa and statin  echo
no further cp  trops significant but his cpk and mb are low and overall I am not susp for a sig infarct  renal mass with apparent metastases make revasc impossible as he will need procedures, so it is best to manage whatever sort of acute coronary event this may be in a conservative fashion  tolerating heparin gtt  would not plan on cath  hgb stable  asa and statin  echo
no further cp  trops significant but his cpk and mb are low and overall I am not susp for a sig infarct  renal mass with apparent metastases make revasc impossible as he will need procedures, so it is best to manage whatever sort of acute coronary event this may be in a conservative fashion  tolerated heparin gtt now off  would not plan on cath yet  hgb stable  asa and statin  cont high dose toprol  echo with overall ok ef and inferior hypokinesis  spoke in detail with radiologist/oncologist and primary team. Will hold plavix for next 2 days, and will keep him npo after MN for biopsy of bone lesion; then will hopefully dc, and fu for diagnostic cath in prep for additional surgery.
no further cp  trops significant but his cpk and mb are low and overall I am not susp for a sig infarct  renal mass with apparent metastases make revasc impossible as he will need procedures, so it is best to manage whatever sort of acute coronary event this may be in a conservative fashion  tolerated heparin gtt now off  would not plan on cath yet  hgb stable  asa and statin  cont high dose toprol  echo with overall ok ef and inferior hypokinesis  spoke in detail with radiologist/oncologist and primary team. Will hold plavix for next 2 days, and will keep him npo after MN for biopsy of bone lesion; then will hopefully dc, and fu for diagnostic cath.

## 2023-01-10 NOTE — DISCHARGE NOTE NURSING/CASE MANAGEMENT/SOCIAL WORK - PATIENT PORTAL LINK FT
You can access the FollowMyHealth Patient Portal offered by [facility] by registering at the following website: [email or web address]. By joining [facility]’s FollowMyHealth portal, you will also be able to view your health information using other applications (apps) compatible with our system.

## 2023-01-10 NOTE — PROGRESS NOTE ADULT - ATTENDING COMMENTS
76 y/o M w/ PMH of CAD, HTN, HLD p/w chest pain, now being managed for ACS, anemia workup, and imaging concerning for renal mass w/ metastasis.    Patient seen and examined at bedside. Patient states he feels better today, but still overlal fatigued. Denies any further chest pain. Denies any SOB, abd pain. CT scan concerning for renal mass with mets to lung and bone. Heme/Onc and Urology consulted, recommend biopsy of spine or lung lesion to confirm diagnosis, but patient currently in ACS protocol with heparin gtt and ASA/plavix. Discussed with Cardio, no plan for cardiac cath at this time, will manage medically. ASA/plavix would need to be held for 5 days prior to IR biopsy, which might need to be pursued as outpatient. Patient also states he was due to have L sided carotid endarterectomy on day he was admitted, so will need Vascular Surgery follow up after discharge as well. Stop heparin  gtt after 48 hours, continue ASA/plavix, D/C planning in AM. Plan of care discussed with wife and daughter at bedside.
78 y/o M w/ PMH of CAD, HTN, HLD p/w chest pain, now being managed for ACS, anemia workup, and imaging concerning for renal mass w/ metastasis.    Patient seen and examined at bedside. Patient states he is feeling well. Still complaining of night sweats, otherwise well. Patient scheduled for IR biopsy of L4 lesion in AM, may try to get bone scan prior to biopsy to see if there would be another lesion that would be more easily accessible. NPO after midnight except meds. Family updated, agreeable with plan.
Patient seen and examined at bedside. States he is feeling well, denies any chest pain, SOB, abd pain. Plan to pursue outpatient lung biopsy next week. Patient to be discharged home today. Please refer to updated D/C note for further details.
78 y/o M w/ PMH of CAD, HTN, HLD p/w chest pain, now being managed for ACS, anemia workup, and imaging concerning for renal mass w/ metastasis.    Patient seen and examined at bedside. Patient states he is feeling better. Still complaining of night sweats. Overnight, patient with 5 min run of wide complex tachycardia, patient states he was getting changed during episode, was asymptomatic. Discussed with Cardio, tele reviewed, doubt Vfib. Cardio spoke with IR, who would be willing to do biopsy of L4 lesion without having to stop ASA on 1/9/23, but would prefer bone scan prior to biopsy. Bone scan ordered. Family updated, agreeable with plan.
76 y/o M w/ PMH of CAD, HTN, HLD p/w chest pain, now being managed for ACS, anemia workup, and imaging concerning for renal mass w/ metastasis.    Patient seen and examined at bedside. Patient states he feels fatigued. Denies any further chest pain. Denies any SOB, abd pain. CT scan concerning for renal mass with mets to lung and bone. Patient and daughter made aware of CT scan findings at bedside. Emotional support provided. Heme/Onc and Urology consulted, recommend biopsy of spine or lung lesion to confirm diagnosis, but patient currently in ACS protocol with heparin gtt and ASA/plavix. Discussed with Cardio, no plan for cardiac cath at this time, will manage medically. ASA/plavix would need to be held for 5 days prior to IR biopsy.
76 y/o M w/ PMH of CAD, HTN, HLD p/w chest pain, now being managed for ACS, anemia workup, and imaging concerning for renal mass w/ metastasis.    Patient seen and examined at bedside. Patient states he is feeling well. Patient had bone scan which did not show any discrete osseous lesions. Care plan discussed multiple times with Cardiology, Heme/Onc and Interventional Radiology. As bone scan not showing any discrete osseous lesions, IR does not want to biopsy L4 lesion. IR would biopsy the lung lesion, but patient would need to be off ASA and plavix for 5 days. Plan to attempt biopsy as outpatient at [place] early next week. Will give 1 unit PRBC today as Hgb 8.0 and patient with recent NSTEMI, patient agreeable. Family updated, agreeable with plan.

## 2023-01-10 NOTE — PROGRESS NOTE ADULT - ASSESSMENT
78 yo M w/ PMH of CAD, HTN, HLD p/w chest pain, now being managed for ACS, anemia workup, and imaging concerning for renal mass w/ metastasis.

## 2023-01-10 NOTE — PROGRESS NOTE ADULT - PROBLEM SELECTOR PLAN 3
- Likely anemia of chronic disease related to an underlying renal malignancy  - S/p 1un PRBC transfusion on 1/9 for Hgb 8 with appropriate rise   - Heme/onc following, recs appreciated

## 2023-01-10 NOTE — PROGRESS NOTE ADULT - PROBLEM SELECTOR PLAN 1
- CT c/a/p: L renal neoplasm [id number] cm, LLL nodule concern for mets, L4 lucent lesion c/f bone mets 1.5 x 1.4 cm  - Suspected renal malignancy with mets with associated severe night sweats  - S/p bone scan on 1/9 with no osseous lesions appreciated  - F/u MRI lumbar spine and skeletal survey   - Per discussion with IR, pt to have bone biopsy at [facility] next week  - ASA and Plavix must be dc'd 5 days prior to any bx  - Heme/onc following, recs appreciated

## 2023-01-12 ENCOUNTER — APPOINTMENT (OUTPATIENT)
Dept: VASCULAR SURGERY | Facility: HOSPITAL | Age: 78
End: 2023-01-12

## 2023-01-12 ENCOUNTER — NON-APPOINTMENT (OUTPATIENT)
Age: 78
End: 2023-01-12

## 2023-01-13 ENCOUNTER — APPOINTMENT (OUTPATIENT)
Dept: UROLOGY | Facility: CLINIC | Age: 78
End: 2023-01-13
Payer: MEDICARE

## 2023-01-13 ENCOUNTER — NON-APPOINTMENT (OUTPATIENT)
Age: 78
End: 2023-01-13

## 2023-01-13 VITALS
RESPIRATION RATE: 16 BRPM | SYSTOLIC BLOOD PRESSURE: 103 MMHG | DIASTOLIC BLOOD PRESSURE: 75 MMHG | HEART RATE: 82 BPM | OXYGEN SATURATION: 98 % | HEIGHT: 67 IN | BODY MASS INDEX: 25.43 KG/M2 | WEIGHT: 162 LBS | TEMPERATURE: 97.9 F

## 2023-01-13 DIAGNOSIS — C64.9 MALIGNANT NEOPLASM OF UNSPECIFIED KIDNEY, EXCEPT RENAL PELVIS: ICD-10-CM

## 2023-01-13 LAB
CULTURE RESULTS: SIGNIFICANT CHANGE UP
CULTURE RESULTS: SIGNIFICANT CHANGE UP
SPECIMEN SOURCE: SIGNIFICANT CHANGE UP
SPECIMEN SOURCE: SIGNIFICANT CHANGE UP

## 2023-01-13 PROCEDURE — 99214 OFFICE O/P EST MOD 30 MIN: CPT

## 2023-01-13 NOTE — HISTORY OF PRESENT ILLNESS
[FreeTextEntry1] : 76 y/o f w/ PMH of CAD, HTN, HLD with recent hospitalization for chest pain and ACS, seen at hospital consultation for CT chest/abd/pelvis significant for L renal lesion [id number] cm concerning for malignancy, LLL nodule concern for mets, L4 lucent lesion c/f bone mets 1.5 x 1.4 cm. Heme/onc was consulted and home Plavix was held as patient was scheduled for bone scan followed by bone biopsy.  Bone scan was done on 1/9, no discrete osseous lesions noted. Discussed with IR, as bone scan negative for osseous lesions, recommend obtaining lung biopsy early next week (need to be off plavix and ASA for 5 days). MRI pelvis done showing 3 bony lesions likely hemangiomas, also moderate to severe canal stenosis without significant central canal compromise. Echo was done and showed EF 55-60%. \par \par Patient also has atherosclerotic carotid disease pending endarterectomy by Dr. Tillman after cadia angiography.\par \par

## 2023-01-13 NOTE — ASSESSMENT
[FreeTextEntry1] : 76 y/o f w/ PMH of CAD, HTN, HLD with recent hospitalization for chest pain and ACS, seen at hospital consultation for CT chest/abd/pelvis significant for L renal lesion [id number] cm concerning for malignancy, LLL nodule concern for mets, L4 lucent lesion c/f bone mets 1.5 x 1.4 cm. Dr. Hester form Heme/onc planning  lung biopsy early next week (needs to be off plavix and ASA for 5 days). MRI and bone scan negative for metastasis in spine. Patient also has atherosclerotic carotid disease pending endarterectomy by Dr. Tillman after cadiac angiography.\par \par Today we discussed the differential of a renal mass with the patient including benign lesions such as oncocytoma, AML, cyst, among others and malignant lesions such as renal cell carcinoma and other cancer histologies.  With a solid renal mass and enhancement there is >80% risk of malignancy, most likely a renal cell carcinoma.  \par \par We discussed radical nephrectomy using open, lap, and robotic approaches. Radical laparoscopic or robotic nephrectomy entails removing the entire kidney, the surrounding fat and possibly, the adrenal gland.   The possible risks are bleeding, infection, pain, damage to surrounding organs.  This procedure can be converted to an open procedure. In his case, surgery can serve as curative intent vs. cytoreductive pending lung nodule biopsy results.\par  \par Given carotid disease and recent ACS with double anti aggregation pending coronary angiography, I discussed with Dr. Hester (oncology) and the patient the need for biopsy with the intent to start targeted/immuno therapy while patient undergoes coronary angiography and endarterectomy. \par \par \par

## 2023-01-13 NOTE — PHYSICAL EXAM
[General Appearance - Well Developed] : well developed [General Appearance - Well Nourished] : well nourished [Normal Appearance] : normal appearance [Well Groomed] : well groomed [General Appearance - In No Acute Distress] : no acute distress [Abdomen Soft] : soft [Abdomen Tenderness] : non-tender [Costovertebral Angle Tenderness] : no ~M costovertebral angle tenderness [Urethral Meatus] : meatus normal [Urinary Bladder Findings] : the bladder was normal on palpation [Scrotum] : the scrotum was normal [Edema] : no peripheral edema [] : no respiratory distress [Respiration, Rhythm And Depth] : normal respiratory rhythm and effort [Exaggerated Use Of Accessory Muscles For Inspiration] : no accessory muscle use [Oriented To Time, Place, And Person] : oriented to person, place, and time [Affect] : the affect was normal [Mood] : the mood was normal [Not Anxious] : not anxious [Normal Station and Gait] : the gait and station were normal for the patient's age [No Focal Deficits] : no focal deficits [No Palpable Adenopathy] : no palpable adenopathy [FreeTextEntry1] : Left renal mass palpable

## 2023-01-17 ENCOUNTER — APPOINTMENT (OUTPATIENT)
Dept: INTERVENTIONAL RADIOLOGY/VASCULAR | Facility: CLINIC | Age: 78
End: 2023-01-17
Payer: MEDICARE

## 2023-01-17 DIAGNOSIS — N28.89 OTHER SPECIFIED DISORDERS OF KIDNEY AND URETER: ICD-10-CM

## 2023-01-17 DIAGNOSIS — R91.1 SOLITARY PULMONARY NODULE: ICD-10-CM

## 2023-01-17 PROCEDURE — 99449 NTRPROF PH1/NTRNET/EHR 31/>: CPT

## 2023-01-17 NOTE — DATA REVIEWED
[FreeTextEntry1] : \par \par ACC: [id number] EXAM: CT ABDOMEN AND PELVIS IC\par ACC: [id number] EXAM: CT ANGIO CHEST PULM ART WAWIC\par \par PROCEDURE DATE: 01/04/2023\par \par \par \par INTERPRETATION: CLINICAL INFORMATION: Weakness, chest pain\par \par COMPARISON: Report of prior CT from 2002. Images are not available.\par \par CONTRAST/COMPLICATIONS:\par IV Contrast: Omnipaque 350 (accession [id number]), IV contrast documented in unlinked concurrent exam (accession [id number]) 74 cc administered (accession [id number]), 0 cc administered (accession [id number]) 26 cc discarded (accession [id number]), 0 cc discarded (accession [id number])\par Oral Contrast: NONE\par Complications: None reported at time of study completion\par \par PROCEDURE:\par CT Angiography of the Chest was performed followed by portal venous phase imaging of the Abdomen and Pelvis.\par Sagittal and coronal reformats were performed as well as 3D (MIP) reconstructions.\par \par FINDINGS:\par \par CHEST:\par LUNGS AND LARGE AIRWAYS: Singulair as are patent. No large focal consolidation. Minimal scattered groundglass. Left lower lobe 12 mm nodule image 65 series 2 adjacent to pulmonary artery branch. This is concerning for metastatic disease. Correlate with PET/CT. Few additional peripheral subcentimeter nodules measuring up to 4 mm of the left lower lobe on image 69 series 2, of unclear significance.\par PLEURA: No pleural effusion or pneumothorax\par VESSELS: No pulmonary embolus. Main pulmonary artery size is within normal limits.\par HEART: Heart size is mildly enlarged, post CABG. LV apical thinning with 1.7 x 0.6 cm aneurysm versus diverticulum, image 76 series 2. Correlate with echocardiogram. No pericardial effusion.\par MEDIASTINUM AND DARA: Small volume lymph nodes. Esophagus is nondistended.\par CHEST WALL AND LOWER NECK: No chest wall hematoma. Visualized thyroid is suboptimally assessed due to streak artifact.\par \par ABDOMEN AND PELVIS:\par LIVER: Liver size within normal limits\par BILE DUCTS: No distention\par GALLBLADDER: Probable fundal adenomyomatosis\par SPLEEN: Spleen size borderline enlarged, 13 cm in craniocaudal dimension\par PANCREAS: No acute peripancreatic inflammation\par ADRENALS: Unremarkable adrenals\par KIDNEYS/URETERS: Left renal large heterogeneous hypervascular mass concerning for renal cell neoplasm, measuring 11.1 x 9.6 x 12.4 cm with multiple areas of internal necrosis and numerous parasitic surrounding vessels. The mass is partially endophytic and mostly exophytic. No hydronephrosis of the kidneys. Additional left renal cyst. Subcentimeter hypodensities too small to characterize.\par \par BLADDER: Underdistended\par REPRODUCTIVE ORGANS: Prostate is enlarged. Question left varicocele.\par \par BOWEL: Limited evaluation due to lack of oral contrast. Stomach is underdistended. No small bowel distention. Appendix is unremarkable. Mild-to-moderate apical limits evaluation of the colonic mucosa. Left-sided large renal mass abuts the left-sided colon and demonstrates mass effect on the mesentery.\par PERITONEUM: No ascites\par VESSELS: No abdominal aortic aneurysm. Aortoiliac atheromatous changes.\par RETROPERITONEUM/LYMPH NODES: Small volume nodes.\par ABDOMINAL WALL: Right posterior gluteal superficial soft tissue nodule measures 2.4 cm. A similar finding was reported on prior CT from 2002, though no images are available for comparison. If this finding is stable over a long period time, sebaceous cyst is considered.\par BONES: Also, 1.5 x 1.4 cm lucent lesion of L4 vertebral body concerning for metastatic disease. Additional nonspecific heterogeneity of the pelvis. Recommend bone scan versus MRI for further evaluation. Multilevel degenerative changes of the bones. Median sternotomy.\par \par IMPRESSION:\par \par No pulmonary embolus or large focal consolidation.\par \par Post CABG. LV apical thinning with 1.7 x 0.6 cm aneurysm versus diverticulum. Small aortic valve calcification. Correlate with echocardiogram. Dr. Garnett discussed these findings with Dr. Wilmer Henderson on 1/4/2023 at 11:54AM, with read back.\par \par Left renal large heterogeneous hypervascular mass concerning for renal cell neoplasm, measuring 11.1 x 9.6 x 12.4 cm. Left lower lobe pulmonary nodule measuring 1.2 cm adjacent to a pulmonary artery branch, concerning metastatic disease. PET/CT and oncology evaluation are recommended for further evaluation. Dr. Garnett discussed these findings with Dr. Wilmer Henderson on 1/4/2023 at 11:54AM, with read back.\par \par Also, 1.5 x 1.4 cm lucent lesion of L4 vertebral body concerning for metastatic disease. Additional nonspecific heterogeneity of the pelvis. Recommend bone scan versus MRI for further evaluation.\par \par --- End of Report ---\par \par

## 2023-01-17 NOTE — PHYSICAL EXAM
[Alert] : alert [Normal Voice/Communication] : normal voice communication [No Respiratory Distress] : no respiratory distress [Oriented x3] : oriented to person, place, and time [Normal Insight/Judgement] : insight and judgment were intact [Normal Affect] : the affect was normal [Normal Mood] : the mood was normal [Recent Memory Normal] : recent memory was not impaired [Remote Memory Normal] : remote memory was not impaired [Restricted in physically strenuous activity but ambulatory and able to carry out work of a light or sedentary nature] : Restricted in physically strenuous activity but ambulatory and able to carry out work of a light or sedentary nature, e.g., light house work, office work

## 2023-01-17 NOTE — HISTORY OF PRESENT ILLNESS
[FreeTextEntry1] : This is a 76 y/o f w/ PMHx of CAD (s/p 3v CABG 1995 and 2 stents in 2008), HTN, HLD and  with recent hospitalization for chest pain/NSTEMI who presents today for consultation for lung biopsy. \par \par Pt reported having a burning sensation in chest, found to have NSTEMI and incidentally found to have lesion in lungs and kidney mass.  Pt was evaluated in hospital by IR, was planned to have bone biopsy, but cancelled after bone scan on 1/9 with no discrete osseous lesions noted. Pt recommended to have lung biopsy and presents today for further discussion. \par \par Prior to hospitalization, pt was was planned for Left IC endarterectomy for 1/12, but cancelled due to recent MI. Per pt, he is planned for a cardiac cath in near future, but does not have date. He is currently on aspirin and Plavix. \par \par Since hospitalization, he feels tired, lost 5 lbs during hospitalization.  Reports intermittent night sweats x 1 month. \par Denies chest pain, dyspnea.\par \par Onc: [facility] [phone], fax [phone]\par \par

## 2023-01-17 NOTE — REASON FOR VISIT
[Home] : at home, [unfilled] , at the time of the visit. [Medical Office: ([facility])___] : at the medical office located in  [Spouse] : spouse [Verbal consent obtained from patient] : the patient, [unfilled] [Consultation] : a consultation visit [FreeTextEntry1] : lung biopsy

## 2023-01-17 NOTE — ASSESSMENT
[Other: _____] : [unfilled] [FreeTextEntry1] : This is a 78 y/o f w/ PMHx of CAD (s/p 3v CABG 1995 and 2 stents in 2008), HTN, HLD and  with recent hospitalization for chest pain and ACS who presents today for consultation for lung biopsy. \par \par 1.  Left lung nodule\par - pt with recent hospitalization for ACS, incidentally found to have lung nodules and large left renal mass\par - biopsy is requested for diagnosis and to help guide treatment options\par - I reviewed image guided lung biopsy procedure, including the risks (pneumothorax, chest tube placement/hospitalization, bleeding, etc), benefits, alternatives, and expected post procedure course.\par - pt with hx of smoking .75 PPD x 50 yrs, likely with some underlying COPD changes, discussed increased risk of pneumothorax due to smoking hx. \par - will need to hold both aspirin and Plavix, given close proximity to pulmonary vessels\par - labs will done at outpatient [facility] lab\par - f/u with oncology to discuss results\par \par 2.  CAD with NSTEMI\par - hospitalized 1/4-1/9/23 for chest pain, +NSTEMI\par - pt stating that he was recommended to have cardiac cath soon, does not currently have date\par - reinforced the importance of being medically optimized prior to proceeding with elective procedure, such as lung biopsy. \par - will require evaluation and clearance from cardiology prior to lung biopsy\par - pt currently has visit with cards on 2/27, instructed to contact office to get earlier appt and to discuss: \par 1) cardiac cath\par 2) Left CEA\par 3) holding aspirin/plavix, need to hold both, very high risk of bleeding\par - will reach out to Dr. Schultz and review above as well. \par - once receive clearance from cardiology, will move forward with biopsy planning, pt and wife in agreement with this plan\par \par Above plan/imaging reviewed with Dr. Marte, in agreement with plan. \par \par I have provided the patient the opportunity to ask questions and have answered them to their satisfaction.  They are encouraged to contact our office with any further questions, concerns, or issues.\par

## 2023-01-18 ENCOUNTER — TRANSCRIPTION ENCOUNTER (OUTPATIENT)
Age: 78
End: 2023-01-18

## 2023-01-19 ENCOUNTER — APPOINTMENT (OUTPATIENT)
Dept: DERMATOLOGY | Facility: CLINIC | Age: 78
End: 2023-01-19

## 2023-01-19 ENCOUNTER — NON-APPOINTMENT (OUTPATIENT)
Age: 78
End: 2023-01-19

## 2023-01-24 ENCOUNTER — TRANSCRIPTION ENCOUNTER (OUTPATIENT)
Age: 78
End: 2023-01-24

## 2023-01-27 ENCOUNTER — OUTPATIENT (OUTPATIENT)
Dept: OUTPATIENT SERVICES | Facility: HOSPITAL | Age: 78
LOS: 1 days | End: 2023-01-27
Payer: MEDICARE

## 2023-01-27 ENCOUNTER — TRANSCRIPTION ENCOUNTER (OUTPATIENT)
Age: 78
End: 2023-01-27

## 2023-01-27 ENCOUNTER — RESULT REVIEW (OUTPATIENT)
Age: 78
End: 2023-01-27

## 2023-01-27 VITALS
HEART RATE: 77 BPM | DIASTOLIC BLOOD PRESSURE: 64 MMHG | SYSTOLIC BLOOD PRESSURE: 104 MMHG | RESPIRATION RATE: 15 BRPM | OXYGEN SATURATION: 99 %

## 2023-01-27 VITALS
RESPIRATION RATE: 16 BRPM | OXYGEN SATURATION: 98 % | DIASTOLIC BLOOD PRESSURE: 61 MMHG | TEMPERATURE: 98 F | WEIGHT: 162.04 LBS | SYSTOLIC BLOOD PRESSURE: 115 MMHG | HEART RATE: 90 BPM

## 2023-01-27 DIAGNOSIS — R91.1 SOLITARY PULMONARY NODULE: ICD-10-CM

## 2023-01-27 DIAGNOSIS — R06.09 OTHER FORMS OF DYSPNEA: ICD-10-CM

## 2023-01-27 DIAGNOSIS — Z95.1 PRESENCE OF AORTOCORONARY BYPASS GRAFT: Chronic | ICD-10-CM

## 2023-01-27 DIAGNOSIS — Z95.5 PRESENCE OF CORONARY ANGIOPLASTY IMPLANT AND GRAFT: Chronic | ICD-10-CM

## 2023-01-27 PROCEDURE — 71045 X-RAY EXAM CHEST 1 VIEW: CPT | Mod: 26

## 2023-01-27 PROCEDURE — 10009 FNA BX W/CT GDN 1ST LES: CPT

## 2023-01-27 PROCEDURE — 88342 IMHCHEM/IMCYTCHM 1ST ANTB: CPT | Mod: 26

## 2023-01-27 PROCEDURE — 88341 IMHCHEM/IMCYTCHM EA ADD ANTB: CPT | Mod: 26

## 2023-01-27 PROCEDURE — 88342 IMHCHEM/IMCYTCHM 1ST ANTB: CPT

## 2023-01-27 PROCEDURE — 88341 IMHCHEM/IMCYTCHM EA ADD ANTB: CPT

## 2023-01-27 PROCEDURE — 71045 X-RAY EXAM CHEST 1 VIEW: CPT

## 2023-01-27 NOTE — ASU DISCHARGE PLAN (ADULT/PEDIATRIC) - ASU DC SPECIAL INSTRUCTIONSFT
Biopsy Discharge    Discharge Instructions  - You have had a biopsy of your lung.   - You may shower in 24 hours. No soaking or swimming until the site is completely healed.  - Keep the area covered and dry for the next 24 hours.  - Do not perform any heavy lifting for the next few days or until the site is healed.  - You may resume your normal diet.  - You may resume your normal medications however you should wait 48 hours before restarting aspirin, plavix, or blood thinners.  - It is normal to experience some pain over the site for the next few days. You may take apply ice to the area (20 minutes on, 20 minutes off) and take Tylenol for that pain. Do not take more frequently than every 6 hours and do not exceed more than 3000mg of Tylenol in a 24 hour period.    - You were given conscious sedation which may make you drowsy, therefore you need someone to stay with you until the morning following the procedure.  - Do not drive, engage in heavy lifting or strenuous activity, or drink any alcoholic beverages for the next 24 hours.   - You may resume normal activity in 24 hours.    Notify your primary physician and/or Interventional Radiology IMMEDIATELY if you experience any of the following       - Fever of 100.4F or 38C       - Chills or Rigors/ Shakes       - Swelling and/or Redness in the area around the biopsy site       - Worsening Pain       - Blood soaked bandages or worsening bleeding       - Lightheadedness and/or dizziness upon standing       - Chest Pain/ Tightness       - Shortness of Breath       - Difficulty walking    If you have a problem that you believe requires IMMEDIATE attention, please go to your NEAREST Emergency Room. If you believe your problem can safely wait until you speak to a physician, please call Interventional Radiology for any concerns.    During Normal Weekday Business Hours- You can contact the Interventional Radiology department during normal business hours via telephone.  During Evenings and Weekends- If you need to contact Interventional Radiology during off hours, do so by calling the hospital and requesting to be connected to the Interventional Radiologist on call.

## 2023-01-27 NOTE — ASU DISCHARGE PLAN (ADULT/PEDIATRIC) - NS MD DC FALL RISK RISK
For information on Fall & Injury Prevention, visit: [email or web address] OR  [email or web address] OR  https://www.cdc.gov/steadi/patient.html

## 2023-01-27 NOTE — PRE PROCEDURE NOTE - PRE PROCEDURE EVALUATION
Interventional Radiology    HPI: Kim is 77M here for a lung biopsy with cyto. He had a CT scan 1/4 showing left lower lobe pulmonary nodule measuring 1.2 cm adjacent to a pulmonary artery branch, concerning for metastatic disease from a large left renal heterogeneous hypervascular mass concerning for renal cell neoplasm, measuring 11.1 x 9.6 x 12.4 cm with multiple areas of internal necrosis and numerous parasitic surrounding vessels. Currently on aspirin and Plavix which were held.      Allergies:   Medications (Abx/Cardiac/Anticoagulation/Blood Products)    Data:    T(C): --  HR: --  BP: --  RR: --  SpO2: --    Imaging:   Reviewed    Plan: Kim is 77M here for a lung biopsy with cyto. He had a CT scan 1/4 showing left lower lobe pulmonary nodule measuring 1.2 cm adjacent to a pulmonary artery branch, concerning for metastatic disease from a large left renal heterogeneous hypervascular mass concerning for renal cell neoplasm, measuring 11.1 x 9.6 x 12.4 cm with multiple areas of internal necrosis and numerous parasitic surrounding vessels. Currently on aspirin and Plavix which were held.      -Risks/Benefits/alternatives explained with the patient and/or healthcare proxy and witnessed informed consent obtained.    Interventional Radiology    HPI: Kim is 77M here for a lung biopsy with cyto. He had a CT scan 1/4 showing left lower lobe pulmonary nodule measuring 1.2 cm adjacent to a pulmonary artery branch, concerning for metastatic disease from a large left renal heterogeneous hypervascular mass concerning for renal cell neoplasm, measuring 11.1 x 9.6 x 12.4 cm with multiple areas of internal necrosis and numerous parasitic surrounding vessels. Currently on aspirin and Plavix which were held.      Allergies:   Medications (Abx/Cardiac/Anticoagulation/Blood Products)    Data:    T(C): --  HR: --  BP: --  RR: --  SpO2: --    Imaging:   Reviewed    Plan: Kim is 77M here for a lung biopsy with cyto.  -Risks/Benefits/alternatives explained with the patient and/or healthcare proxy and witnessed informed consent obtained.

## 2023-02-03 LAB — NON-GYNECOLOGICAL CYTOLOGY STUDY: SIGNIFICANT CHANGE UP

## 2023-02-16 ENCOUNTER — OUTPATIENT (OUTPATIENT)
Dept: OUTPATIENT SERVICES | Facility: HOSPITAL | Age: 78
LOS: 1 days | End: 2023-02-16
Payer: MEDICARE

## 2023-02-16 DIAGNOSIS — C90.01 MULTIPLE MYELOMA IN REMISSION: ICD-10-CM

## 2023-02-16 DIAGNOSIS — Z95.5 PRESENCE OF CORONARY ANGIOPLASTY IMPLANT AND GRAFT: Chronic | ICD-10-CM

## 2023-02-16 DIAGNOSIS — Z95.1 PRESENCE OF AORTOCORONARY BYPASS GRAFT: Chronic | ICD-10-CM

## 2023-02-16 PROCEDURE — 88364 INSITU HYBRIDIZATION (FISH): CPT | Mod: 26

## 2023-02-16 PROCEDURE — 88291 CYTO/MOLECULAR REPORT: CPT

## 2023-02-16 PROCEDURE — 88305 TISSUE EXAM BY PATHOLOGIST: CPT | Mod: 26

## 2023-02-16 PROCEDURE — 88365 INSITU HYBRIDIZATION (FISH): CPT

## 2023-02-16 PROCEDURE — 88341 IMHCHEM/IMCYTCHM EA ADD ANTB: CPT | Mod: 26

## 2023-02-16 PROCEDURE — 87205 SMEAR GRAM STAIN: CPT

## 2023-02-16 PROCEDURE — 88342 IMHCHEM/IMCYTCHM 1ST ANTB: CPT | Mod: 26,59

## 2023-02-16 PROCEDURE — 88365 INSITU HYBRIDIZATION (FISH): CPT | Mod: 26

## 2023-02-16 PROCEDURE — 77012 CT SCAN FOR NEEDLE BIOPSY: CPT

## 2023-02-16 PROCEDURE — 88185 FLOWCYTOMETRY/TC ADD-ON: CPT

## 2023-02-16 PROCEDURE — 88271 CYTOGENETICS DNA PROBE: CPT

## 2023-02-16 PROCEDURE — 88280 CHROMOSOME KARYOTYPE STUDY: CPT

## 2023-02-16 PROCEDURE — 88300 SURGICAL PATH GROSS: CPT | Mod: 26

## 2023-02-16 PROCEDURE — 88313 SPECIAL STAINS GROUP 2: CPT

## 2023-02-16 PROCEDURE — 20225 BONE BIOPSY TROCAR/NDL DEEP: CPT

## 2023-02-16 PROCEDURE — 77012 CT SCAN FOR NEEDLE BIOPSY: CPT | Mod: 26

## 2023-02-16 PROCEDURE — 88300 SURGICAL PATH GROSS: CPT

## 2023-02-16 PROCEDURE — 88108 CYTOPATH CONCENTRATE TECH: CPT | Mod: 26,59

## 2023-02-16 PROCEDURE — 88187 FLOWCYTOMETRY/READ 2-8: CPT

## 2023-02-16 PROCEDURE — 88312 SPECIAL STAINS GROUP 1: CPT | Mod: 26

## 2023-02-16 PROCEDURE — 88341 IMHCHEM/IMCYTCHM EA ADD ANTB: CPT

## 2023-02-16 PROCEDURE — 88313 SPECIAL STAINS GROUP 2: CPT | Mod: 26

## 2023-02-16 PROCEDURE — 88237 TISSUE CULTURE BONE MARROW: CPT

## 2023-02-16 PROCEDURE — 88275 CYTOGENETICS 100-300: CPT

## 2023-02-16 PROCEDURE — 88264 CHROMOSOME ANALYSIS 20-25: CPT

## 2023-02-16 PROCEDURE — 85097 BONE MARROW INTERPRETATION: CPT

## 2023-02-16 PROCEDURE — 88312 SPECIAL STAINS GROUP 1: CPT

## 2023-02-16 PROCEDURE — 88305 TISSUE EXAM BY PATHOLOGIST: CPT

## 2023-02-27 ENCOUNTER — NON-APPOINTMENT (OUTPATIENT)
Age: 78
End: 2023-02-27

## 2023-02-27 ENCOUNTER — APPOINTMENT (OUTPATIENT)
Dept: CARDIOLOGY | Facility: CLINIC | Age: 78
End: 2023-02-27
Payer: MEDICARE

## 2023-02-27 VITALS
SYSTOLIC BLOOD PRESSURE: 133 MMHG | OXYGEN SATURATION: 100 % | WEIGHT: 155 LBS | BODY MASS INDEX: 24.33 KG/M2 | HEART RATE: 97 BPM | DIASTOLIC BLOOD PRESSURE: 79 MMHG | HEIGHT: 67 IN

## 2023-02-27 DIAGNOSIS — R94.31 ABNORMAL ELECTROCARDIOGRAM [ECG] [EKG]: ICD-10-CM

## 2023-02-27 DIAGNOSIS — I25.10 ATHEROSCLEROTIC HEART DISEASE OF NATIVE CORONARY ARTERY W/OUT ANGINA PECTORIS: ICD-10-CM

## 2023-02-27 PROCEDURE — 99214 OFFICE O/P EST MOD 30 MIN: CPT

## 2023-02-27 PROCEDURE — 93000 ELECTROCARDIOGRAM COMPLETE: CPT

## 2023-02-27 RX ORDER — CLOPIDOGREL BISULFATE 75 MG/1
75 TABLET, FILM COATED ORAL
Qty: 90 | Refills: 2 | Status: ACTIVE | COMMUNITY
Start: 2023-01-17 | End: 1900-01-01

## 2023-02-27 NOTE — PHYSICAL EXAM
[Well Developed] : well developed [Well Nourished] : well nourished [No Acute Distress] : no acute distress [Normal Conjunctiva] : normal conjunctiva [Normal Venous Pressure] : normal venous pressure [Normal S1, S2] : normal S1, S2 [No Murmur] : no murmur [No Rub] : no rub [No Gallop] : no gallop [Clear Lung Fields] : clear lung fields [Good Air Entry] : good air entry [No Respiratory Distress] : no respiratory distress  [Soft] : abdomen soft [Non Tender] : non-tender [No Masses/organomegaly] : no masses/organomegaly [Normal Bowel Sounds] : normal bowel sounds [Normal Gait] : normal gait [No Edema] : no edema [No Cyanosis] : no cyanosis [No Clubbing] : no clubbing [No Varicosities] : no varicosities [No Rash] : no rash [No Skin Lesions] : no skin lesions [Moves all extremities] : moves all extremities [No Focal Deficits] : no focal deficits [Normal Speech] : normal speech [Alert and Oriented] : alert and oriented [Normal memory] : normal memory [de-identified] : + left carotid bruit

## 2023-02-27 NOTE — HISTORY OF PRESENT ILLNESS
[FreeTextEntry1] : Mr. Alvarez is a 77 year-old male with a long history of atherosclerotic heart disease. He had coronary artery bypass graft in 1995. He subsequently had stenting to the saphenous vein graft to the OM1 in 2007 and  2009. In February 2013 the patient was getting a lot of angina and had an abnormal stress test. As of 2013, there was interval closure of the left circumflex stents. The RCA and distal left circumflex were closed, as were seen previously and there were no lesions amenable to PCI. His medications were adjusted; and he has been without angina since that time.\par \par Echo in 5/2018 was unremarkable, and unchanged in 2021 (mild MR, normal LV function, mod LA dilatation). Pharm stress with large inferior infarct with mild fatmata-infarct ischemia, which was decided to be medically managed.\par \par I last saw him in 12/22.\par At this visit, he was requesting cardiac clearance prior to a left CEA.  On routine blood work, he was noted to have new anemia, and his surgery was put on hold.\par \par On 1/4/2023, he presented to the emergency room with chest pain, that was relieved by nitroglycerin.  His hemoglobin on admission was 8.6, down from his prior baseline of 14.  The CT demonstrated a large renal mass, along with a pulmonary nodule, suggesting a metastasis.  He was treated for a small NSTEMI with intravenous heparin, aspirin and Plavix, and was eventually discharged home.  An echocardiogram confirmed normal LV function.\par He is now status post biopsy of the lung mass, demonstrating metastatic renal carcinoma.  Because of recurrent night sweats, he is also status post a bone biopsy, to rule out myeloma.\par \par He is very frustrated about his whole situation.  Though he has no chest pain, he reports fatigue, dyspnea on exertion, feeling cold, and night sweats.  The only day he felt better, with the day after a blood transfusion.\par

## 2023-02-27 NOTE — REVIEW OF SYSTEMS
[Negative] : Heme/Lymph [Feeling Fatigued] : feeling fatigued [SOB] : shortness of breath [Dyspnea on exertion] : dyspnea during exertion [Dizziness] : dizziness [Weakness] : weakness

## 2023-02-27 NOTE — DISCUSSION/SUMMARY
[With Me] : with me [___ Month(s)] : in [unfilled] month(s) [FreeTextEntry1] : Mr. Alvarez is a 77 year-old male with a long history of atherosclerotic heart disease and HTN.\par He is status post bypass in 1995, with stents to the SVG to OM1 2007 in 2009.\par \par He has most recently been diagnosed with metastatic renal carcinoma, based on a CT-guided lung biopsy.  He continues to report fatigue, dyspnea, and appears pale, suggesting that he is again anemic.  He is following up with his hematologist tomorrow, to see if additional blood transfusions or iron infusions are required.\par \par He did present to the hospital with chest pain, and was treated for a mild NSTEMI.  Though this may have all been related to his anemia, we will at some point need to perform a cardiac catheterization to confirm the absence of additional obstructive CAD.  He will remain on aspirin and Plavix for now, pending his blood count results.\par \par We will speak at the end of the week, to further delineate his oncologic plan. [EKG obtained to assist in diagnosis and management of assessed problem(s)] : EKG obtained to assist in diagnosis and management of assessed problem(s)

## 2023-03-02 ENCOUNTER — OUTPATIENT (OUTPATIENT)
Dept: OUTPATIENT SERVICES | Facility: HOSPITAL | Age: 78
LOS: 1 days | End: 2023-03-02
Payer: MEDICARE

## 2023-03-02 ENCOUNTER — APPOINTMENT (OUTPATIENT)
Dept: INTERNAL MEDICINE | Facility: CLINIC | Age: 78
End: 2023-03-02
Payer: MEDICARE

## 2023-03-02 ENCOUNTER — APPOINTMENT (OUTPATIENT)
Dept: RADIOLOGY | Facility: CLINIC | Age: 78
End: 2023-03-02
Payer: MEDICARE

## 2023-03-02 VITALS
WEIGHT: 150 LBS | HEIGHT: 67 IN | HEART RATE: 94 BPM | DIASTOLIC BLOOD PRESSURE: 70 MMHG | TEMPERATURE: 97.5 F | BODY MASS INDEX: 23.54 KG/M2 | OXYGEN SATURATION: 98 % | SYSTOLIC BLOOD PRESSURE: 126 MMHG | RESPIRATION RATE: 14 BRPM

## 2023-03-02 DIAGNOSIS — R05.9 COUGH, UNSPECIFIED: ICD-10-CM

## 2023-03-02 DIAGNOSIS — J20.9 ACUTE BRONCHITIS, UNSPECIFIED: ICD-10-CM

## 2023-03-02 DIAGNOSIS — F32.A DEPRESSION, UNSPECIFIED: ICD-10-CM

## 2023-03-02 PROCEDURE — 99214 OFFICE O/P EST MOD 30 MIN: CPT

## 2023-03-02 PROCEDURE — 71046 X-RAY EXAM CHEST 2 VIEWS: CPT | Mod: 26

## 2023-03-02 PROCEDURE — 71046 X-RAY EXAM CHEST 2 VIEWS: CPT

## 2023-03-02 RX ORDER — AZITHROMYCIN 250 MG/1
250 TABLET, FILM COATED ORAL
Qty: 1 | Refills: 0 | Status: DISCONTINUED | COMMUNITY
Start: 2023-03-02 | End: 2023-03-02

## 2023-03-02 RX ORDER — AMOXICILLIN AND CLAVULANATE POTASSIUM 875; 125 MG/1; MG/1
875-125 TABLET, COATED ORAL
Qty: 14 | Refills: 0 | Status: ACTIVE | COMMUNITY
Start: 2023-03-02 | End: 1900-01-01

## 2023-03-02 RX ORDER — SERTRALINE HYDROCHLORIDE 50 MG/1
50 TABLET, FILM COATED ORAL
Qty: 30 | Refills: 2 | Status: ACTIVE | COMMUNITY
Start: 2023-03-02 | End: 1900-01-01

## 2023-03-02 NOTE — HISTORY OF PRESENT ILLNESS
[FreeTextEntry8] : Pt with cough and congestion for 2 weeks.\par No sputum\par No wheeze\par Work up in progress for metastatic renal ca.\par Also, very depressed over diagnosis. \par

## 2023-03-02 NOTE — PHYSICAL EXAM
[Normal Outer Ear/Nose] : the outer ears and nose were normal in appearance [Soft] : abdomen soft [Normal] : affect was normal and insight and judgment were intact [de-identified] : coarse b/s

## 2023-03-02 NOTE — REVIEW OF SYSTEMS
[Nasal Discharge] : nasal discharge [Sore Throat] : sore throat [Cough] : cough [Suicidal] : not suicidal [Insomnia] : insomnia [Anxiety] : anxiety [Depression] : depression [Negative] : Integumentary [FreeTextEntry6] : cough and congestion

## 2023-03-02 NOTE — ASSESSMENT
[FreeTextEntry1] : Cough- Augmentin 875mg bid for 7 days ,Check chest Xray- may need to see pulmonary for evaluation \par Depression- start the Zoloft recheck 6 weeks\par RCC- appt. at [facility] next week.- ( second opinion)

## 2023-03-03 DIAGNOSIS — R05.9 COUGH, UNSPECIFIED: ICD-10-CM

## 2023-03-03 RX ORDER — BENZONATATE 200 MG/1
200 CAPSULE ORAL
Qty: 20 | Refills: 0 | Status: ACTIVE | COMMUNITY
Start: 2023-03-03 | End: 1900-01-01

## 2023-03-13 ENCOUNTER — APPOINTMENT (OUTPATIENT)
Dept: UROLOGY | Facility: CLINIC | Age: 78
End: 2023-03-13

## 2023-03-27 DIAGNOSIS — R26.89 OTHER ABNORMALITIES OF GAIT AND MOBILITY: ICD-10-CM

## 2023-03-31 ENCOUNTER — INPATIENT (INPATIENT)
Facility: HOSPITAL | Age: 78
LOS: 3 days | Discharge: HOME | DRG: 686 | End: 2023-04-04
Attending: FAMILY MEDICINE | Admitting: STUDENT IN AN ORGANIZED HEALTH CARE EDUCATION/TRAINING PROGRAM
Payer: MEDICARE

## 2023-03-31 VITALS
DIASTOLIC BLOOD PRESSURE: 78 MMHG | OXYGEN SATURATION: 97 % | HEART RATE: 76 BPM | TEMPERATURE: 98 F | SYSTOLIC BLOOD PRESSURE: 138 MMHG | RESPIRATION RATE: 20 BRPM

## 2023-03-31 DIAGNOSIS — Z95.5 PRESENCE OF CORONARY ANGIOPLASTY IMPLANT AND GRAFT: Chronic | ICD-10-CM

## 2023-03-31 DIAGNOSIS — R74.01 ELEVATION OF LEVELS OF LIVER TRANSAMINASE LEVELS: ICD-10-CM

## 2023-03-31 DIAGNOSIS — E78.5 HYPERLIPIDEMIA, UNSPECIFIED: ICD-10-CM

## 2023-03-31 DIAGNOSIS — Z95.1 PRESENCE OF AORTOCORONARY BYPASS GRAFT: Chronic | ICD-10-CM

## 2023-03-31 DIAGNOSIS — Z79.899 OTHER LONG TERM (CURRENT) DRUG THERAPY: ICD-10-CM

## 2023-03-31 DIAGNOSIS — C64.9 MALIGNANT NEOPLASM OF UNSPECIFIED KIDNEY, EXCEPT RENAL PELVIS: ICD-10-CM

## 2023-03-31 DIAGNOSIS — I10 ESSENTIAL (PRIMARY) HYPERTENSION: ICD-10-CM

## 2023-03-31 DIAGNOSIS — A41.9 SEPSIS, UNSPECIFIED ORGANISM: ICD-10-CM

## 2023-03-31 DIAGNOSIS — R53.1 WEAKNESS: ICD-10-CM

## 2023-03-31 DIAGNOSIS — D72.829 ELEVATED WHITE BLOOD CELL COUNT, UNSPECIFIED: ICD-10-CM

## 2023-03-31 DIAGNOSIS — I25.10 ATHEROSCLEROTIC HEART DISEASE OF NATIVE CORONARY ARTERY WITHOUT ANGINA PECTORIS: ICD-10-CM

## 2023-03-31 DIAGNOSIS — D63.0 ANEMIA IN NEOPLASTIC DISEASE: ICD-10-CM

## 2023-03-31 DIAGNOSIS — D64.9 ANEMIA, UNSPECIFIED: ICD-10-CM

## 2023-03-31 LAB
ALBUMIN SERPL ELPH-MCNC: 1.1 G/DL — LOW (ref 3.3–5)
ALP SERPL-CCNC: 227 U/L — HIGH (ref 40–120)
ALT FLD-CCNC: 42 U/L — SIGNIFICANT CHANGE UP (ref 12–78)
ANION GAP SERPL CALC-SCNC: 5 MMOL/L — SIGNIFICANT CHANGE UP (ref 5–17)
ANISOCYTOSIS BLD QL: SLIGHT — SIGNIFICANT CHANGE UP
APTT BLD: 37.1 SEC — HIGH (ref 27.5–35.5)
AST SERPL-CCNC: 65 U/L — HIGH (ref 15–37)
BASOPHILS # BLD AUTO: 0.01 K/UL — SIGNIFICANT CHANGE UP (ref 0–0.2)
BASOPHILS NFR BLD AUTO: 0.1 % — SIGNIFICANT CHANGE UP (ref 0–2)
BILIRUB SERPL-MCNC: 0.5 MG/DL — SIGNIFICANT CHANGE UP (ref 0.2–1.2)
BLD GP AB SCN SERPL QL: SIGNIFICANT CHANGE UP
BUN SERPL-MCNC: 21 MG/DL — SIGNIFICANT CHANGE UP (ref 7–23)
CALCIUM SERPL-MCNC: 8.9 MG/DL — SIGNIFICANT CHANGE UP (ref 8.5–10.1)
CHLORIDE SERPL-SCNC: 106 MMOL/L — SIGNIFICANT CHANGE UP (ref 96–108)
CK MB CFR SERPL CALC: <1 NG/ML — SIGNIFICANT CHANGE UP (ref 0–3.6)
CO2 SERPL-SCNC: 23 MMOL/L — SIGNIFICANT CHANGE UP (ref 22–31)
CREAT SERPL-MCNC: 0.99 MG/DL — SIGNIFICANT CHANGE UP (ref 0.5–1.3)
EGFR: 79 ML/MIN/1.73M2 — SIGNIFICANT CHANGE UP
EOSINOPHIL # BLD AUTO: 0.02 K/UL — SIGNIFICANT CHANGE UP (ref 0–0.5)
EOSINOPHIL NFR BLD AUTO: 0.1 % — SIGNIFICANT CHANGE UP (ref 0–6)
GLUCOSE SERPL-MCNC: 89 MG/DL — SIGNIFICANT CHANGE UP (ref 70–99)
HCT VFR BLD CALC: 28.3 % — LOW (ref 39–50)
HGB BLD-MCNC: 8.4 G/DL — LOW (ref 13–17)
HYPOCHROMIA BLD QL: SLIGHT — SIGNIFICANT CHANGE UP
IMM GRANULOCYTES NFR BLD AUTO: 1.2 % — HIGH (ref 0–0.9)
INR BLD: 1.43 RATIO — HIGH (ref 0.88–1.16)
LACTATE SERPL-SCNC: 2.1 MMOL/L — HIGH (ref 0.7–2)
LACTATE SERPL-SCNC: 2.5 MMOL/L — HIGH (ref 0.7–2)
LIDOCAIN IGE QN: 254 U/L — SIGNIFICANT CHANGE UP (ref 73–393)
LYMPHOCYTES # BLD AUTO: 1.21 K/UL — SIGNIFICANT CHANGE UP (ref 1–3.3)
LYMPHOCYTES # BLD AUTO: 8.4 % — LOW (ref 13–44)
MAGNESIUM SERPL-MCNC: 2.6 MG/DL — SIGNIFICANT CHANGE UP (ref 1.6–2.6)
MANUAL SMEAR VERIFICATION: SIGNIFICANT CHANGE UP
MCHC RBC-ENTMCNC: 23.5 PG — LOW (ref 27–34)
MCHC RBC-ENTMCNC: 29.7 GM/DL — LOW (ref 32–36)
MCV RBC AUTO: 79.1 FL — LOW (ref 80–100)
MICROCYTES BLD QL: SLIGHT — SIGNIFICANT CHANGE UP
MONOCYTES # BLD AUTO: 0.73 K/UL — SIGNIFICANT CHANGE UP (ref 0–0.9)
MONOCYTES NFR BLD AUTO: 5.1 % — SIGNIFICANT CHANGE UP (ref 2–14)
NEUTROPHILS # BLD AUTO: 12.18 K/UL — HIGH (ref 1.8–7.4)
NEUTROPHILS NFR BLD AUTO: 85.1 % — HIGH (ref 43–77)
NRBC # BLD: 0 /100 WBCS — SIGNIFICANT CHANGE UP (ref 0–0)
NT-PROBNP SERPL-SCNC: 2256 PG/ML — HIGH (ref 0–450)
PLAT MORPH BLD: NORMAL — SIGNIFICANT CHANGE UP
PLATELET # BLD AUTO: 499 K/UL — HIGH (ref 150–400)
POIKILOCYTOSIS BLD QL AUTO: SLIGHT — SIGNIFICANT CHANGE UP
POTASSIUM SERPL-MCNC: 4.5 MMOL/L — SIGNIFICANT CHANGE UP (ref 3.5–5.3)
POTASSIUM SERPL-SCNC: 4.5 MMOL/L — SIGNIFICANT CHANGE UP (ref 3.5–5.3)
PROT SERPL-MCNC: 8.3 G/DL — SIGNIFICANT CHANGE UP (ref 6–8.3)
PROTHROM AB SERPL-ACNC: 16.8 SEC — HIGH (ref 10.5–13.4)
RAPID RVP RESULT: SIGNIFICANT CHANGE UP
RBC # BLD: 3.58 M/UL — LOW (ref 4.2–5.8)
RBC # FLD: 23.9 % — HIGH (ref 10.3–14.5)
RBC BLD AUTO: ABNORMAL
SARS-COV-2 RNA SPEC QL NAA+PROBE: SIGNIFICANT CHANGE UP
SODIUM SERPL-SCNC: 134 MMOL/L — LOW (ref 135–145)
TROPONIN I, HIGH SENSITIVITY RESULT: 14.7 NG/L — SIGNIFICANT CHANGE UP
TSH SERPL-MCNC: 4.31 UIU/ML — HIGH (ref 0.36–3.74)
WBC # BLD: 14.32 K/UL — HIGH (ref 3.8–10.5)
WBC # FLD AUTO: 14.32 K/UL — HIGH (ref 3.8–10.5)

## 2023-03-31 PROCEDURE — 71045 X-RAY EXAM CHEST 1 VIEW: CPT | Mod: 26

## 2023-03-31 PROCEDURE — 93010 ELECTROCARDIOGRAM REPORT: CPT | Mod: 76

## 2023-03-31 PROCEDURE — 70450 CT HEAD/BRAIN W/O DYE: CPT | Mod: 26

## 2023-03-31 PROCEDURE — 99285 EMERGENCY DEPT VISIT HI MDM: CPT

## 2023-03-31 PROCEDURE — 99223 1ST HOSP IP/OBS HIGH 75: CPT | Mod: GC

## 2023-03-31 RX ORDER — LANOLIN ALCOHOL/MO/W.PET/CERES
3 CREAM (GRAM) TOPICAL AT BEDTIME
Refills: 0 | Status: DISCONTINUED | OUTPATIENT
Start: 2023-03-31 | End: 2023-04-04

## 2023-03-31 RX ORDER — SODIUM CHLORIDE 9 MG/ML
1000 INJECTION INTRAMUSCULAR; INTRAVENOUS; SUBCUTANEOUS ONCE
Refills: 0 | Status: COMPLETED | OUTPATIENT
Start: 2023-03-31 | End: 2023-03-31

## 2023-03-31 RX ORDER — SODIUM CHLORIDE 9 MG/ML
1000 INJECTION INTRAMUSCULAR; INTRAVENOUS; SUBCUTANEOUS
Refills: 0 | Status: DISCONTINUED | OUTPATIENT
Start: 2023-03-31 | End: 2023-04-04

## 2023-03-31 RX ORDER — ACETAMINOPHEN 500 MG
650 TABLET ORAL EVERY 6 HOURS
Refills: 0 | Status: DISCONTINUED | OUTPATIENT
Start: 2023-03-31 | End: 2023-04-04

## 2023-03-31 RX ORDER — ISOSORBIDE DINITRATE 5 MG/1
30 TABLET ORAL
Refills: 0 | Status: DISCONTINUED | OUTPATIENT
Start: 2023-03-31 | End: 2023-03-31

## 2023-03-31 RX ORDER — AMLODIPINE BESYLATE 2.5 MG/1
10 TABLET ORAL DAILY
Refills: 0 | Status: DISCONTINUED | OUTPATIENT
Start: 2023-03-31 | End: 2023-04-04

## 2023-03-31 RX ORDER — METOPROLOL TARTRATE 50 MG
200 TABLET ORAL DAILY
Refills: 0 | Status: DISCONTINUED | OUTPATIENT
Start: 2023-03-31 | End: 2023-04-04

## 2023-03-31 RX ORDER — ONDANSETRON 8 MG/1
4 TABLET, FILM COATED ORAL EVERY 8 HOURS
Refills: 0 | Status: DISCONTINUED | OUTPATIENT
Start: 2023-03-31 | End: 2023-04-04

## 2023-03-31 RX ORDER — ASPIRIN/CALCIUM CARB/MAGNESIUM 324 MG
81 TABLET ORAL DAILY
Refills: 0 | Status: DISCONTINUED | OUTPATIENT
Start: 2023-03-31 | End: 2023-04-04

## 2023-03-31 RX ORDER — CLOPIDOGREL BISULFATE 75 MG/1
75 TABLET, FILM COATED ORAL DAILY
Refills: 0 | Status: DISCONTINUED | OUTPATIENT
Start: 2023-04-01 | End: 2023-04-04

## 2023-03-31 RX ORDER — ASPIRIN/CALCIUM CARB/MAGNESIUM 324 MG
0 TABLET ORAL
Qty: 0 | Refills: 0 | DISCHARGE

## 2023-03-31 RX ORDER — LISINOPRIL 2.5 MG/1
20 TABLET ORAL DAILY
Refills: 0 | Status: DISCONTINUED | OUTPATIENT
Start: 2023-03-31 | End: 2023-04-04

## 2023-03-31 RX ORDER — ISOSORBIDE MONONITRATE 60 MG/1
30 TABLET, EXTENDED RELEASE ORAL DAILY
Refills: 0 | Status: DISCONTINUED | OUTPATIENT
Start: 2023-03-31 | End: 2023-04-04

## 2023-03-31 RX ADMIN — SODIUM CHLORIDE 1000 MILLILITER(S): 9 INJECTION INTRAMUSCULAR; INTRAVENOUS; SUBCUTANEOUS at 20:12

## 2023-03-31 RX ADMIN — SODIUM CHLORIDE 1000 MILLILITER(S): 9 INJECTION INTRAMUSCULAR; INTRAVENOUS; SUBCUTANEOUS at 22:59

## 2023-03-31 NOTE — ED ADULT NURSE NOTE - OBJECTIVE STATEMENT
pt is A&Ox4, presented to the Er for generalized weakness, denies any pain at this moment, resp even and unlabored, iv placed, meds given as per MD orders, will continue to monitor

## 2023-03-31 NOTE — H&P ADULT - PROBLEM SELECTOR PLAN 7
Hold home statin 2/2 transaminitis. - C/w Norvasc, Metoprolol, Lisinopril. - C/w Norvasc, Metoprolol, Lisinopril  - Monitor hemodynamics

## 2023-03-31 NOTE — H&P ADULT - PROBLEM SELECTOR PLAN 8
SCDs    ##GOC  Patient states he does not have a HCP and has not made decisions regarding code status--> remains full code Hold home statin 2/2 transaminitis.

## 2023-03-31 NOTE — ED PROVIDER NOTE - OBJECTIVE STATEMENT
77-year-old male with past medical history of CAD, cardiac stent, CABG x3 vessels, hypertension, hyperlipidemia, newly diagnosed 77-year-old male with past medical history of CAD, cardiac stent, CABG x3 vessels, hypertension, hyperlipidemia, newly diagnosed Left renal carcinoma with mets to the lung, currently on axitinib 5 mg orally twice daily with chemoinfusion, last chemo was 2 weeks ago, presents to the emergency department by ambulance with report of fall.  Patient states that he has been having loose stool, generalized weakness, decreased appetite, today states that he had a bowel movement in his pants and went to get up and was unable to and fell to the ground and was helped up by family, patient states that his legs feel weak, denies headache, no chest pain, no vomiting.

## 2023-03-31 NOTE — H&P ADULT - PROBLEM SELECTOR PLAN 2
Plan: Hb 8.4, previous baseline used to 14 before cancer diagnosis   - maybe related to renal mass, possible anemia of chronic disease related to an underlying renal malignancy  - iron studies ordered  - GI  following on previous admission with Hb 8.6--> no overt gi bleeding  - Heme Onc Dr. Jay consulted fu recs - Patient with leukocytosis (wbc 14.32)  and elevated lactate may be 2/2 to cancer ; unclear source of infection  - Does not meet sepsis criteria on admission   - Leucytosis may be in setting of cancer/chemo vs reactive 2/2 to recent fall   - F/u UA, UCx, BCx x2  - Trend WBC and monitor for fever - Patient with leukocytosis (wbc 14.32) and elevated lactate may be 2/2 to cancer. Awaiting UA.  - Does not meet sepsis criteria on admission   - Leukocytosis may be in setting of cancer/chemo vs reactive 2/2 to recent fall   - F/u UA, UCx, BCx x2  - Trend WBC and monitor for fever

## 2023-03-31 NOTE — H&P ADULT - NSHPREVIEWOFSYSTEMS_GEN_ALL_CORE
REVIEW OF SYSTEMS:    CONSTITUTIONAL:  +weakness +fatigue no fevers or chills   EYES/ENT:  No visual changes;  No vertigo or throat pain   NECK:  No pain or stiffness  RESPIRATORY:  No cough, wheezing, hemoptysis; No shortness of breath  CARDIOVASCULAR:  No chest pain or palpitations  GASTROINTESTINAL: +diarrhea;  No abdominal or epigastric pain. No nausea, vomiting, or hematemesis; No constipation. No melena or hematochezia.  GENITOURINARY:  No dysuria, frequency or hematuria  NEUROLOGICAL:  No numbness or weakness  SKIN:  No itching, rashes

## 2023-03-31 NOTE — H&P ADULT - PROBLEM SELECTOR PLAN 3
AST/ ALT 65/42  GI  following on previous admission--> noted elevated lfts; liver normal on CT   will HOLD home statin Plan: Hb 8.4, previous baseline used to 14 before cancer diagnosis   - maybe related to renal mass, possible anemia of chronic disease related to an underlying renal malignancy  - iron studies ordered  - GI  following on previous admission with Hb 8.6--> no overt gi bleeding  - Heme Onc Dr. Jay consulted fu recs Plan: Hb 8.4, previous baseline used to 14 before cancer diagnosis   - maybe related to renal mass, possible anemia of chronic disease related to an underlying renal malignancy  - iron studies ordered  - being transfused 1U PRBC  - GI  following on previous admission with Hb 8.6--> no overt gi bleeding  - Heme Onc Dr. Jay consulted fu recs

## 2023-03-31 NOTE — H&P ADULT - PROBLEM SELECTOR PLAN 1
Patient presents s/p fall 2/2 to weakness denies syncope  - CT head ordered f/u results   - Weakness likely secondary to started chemotherapy treatment and/or infectious etiology   - Also complains of diarrhea had BM prior to fall; f/u GI stool PCR   - Patient with leukocytosis (wbc 14.32)  and elevated lactate may be 2/2 to cancer ; unclear source of infection  - Does not meet sepsis criteria on admission   - Given IV NS 1 L bolus x1 in ED  - Continue IV NS   - Lactate 2.5 on admission, f/u repeat lactate (6 hours later)  - Trend WBC and monitor for fever  - F/u UA, UCx, BCx x2  - ID (Dr. Almanzar) consulted, f/u recs  - PT consulted Patient presents s/p fall 2/2 to weakness denies syncope  - CT head ordered f/u results   - Weakness likely secondary to started chemotherapy treatment and/or infectious etiology   - Also complains of diarrhea had BM prior to fall; f/u GI stool PCR   - Patient with leukocytosis (wbc 14.32)  and elevated lactate may be 2/2 to cancer ; unclear source of infection  - Does not meet sepsis criteria on admission   - Given IV NS 1 L bolus x1 in ED  - Continue IV NS   - Lactate 2.5 on admission, f/u repeat lactate (6 hours later)  - Trend WBC and monitor for fever  - F/u UA, UCx, BCx x2  - PT consulted Patient presents s/p fall 2/2 to weakness denies syncope  - CT head ordered f/u results   - Weakness likely secondary to started chemotherapy treatment and/or infectious etiology   - Also complains of diarrhea had BM prior to fall; f/u GI stool PCR   - Patient with leukocytosis (wbc 14.32)  and elevated lactate may be 2/2 to cancer ; unclear source of infection  - Does not meet sepsis criteria on admission   - Given IV NS 1 L bolus x1 in ED  - Continue IV NS   - Lactate 2.5 on admission, f/u repeat lactate (6 hours later)  - Trend WBC and monitor for fever  - F/u UA, UCx, BCx x2  - ID Dr. Almanzar consulted   - PT consulted

## 2023-03-31 NOTE — H&P ADULT - PROBLEM SELECTOR PLAN 9
SCDs    ##GOC  Patient states he does not have a HCP and has not made decisions regarding code status--> remains full code VTE ppx: SCDs in setting of anemia    #GOC  Patient states he does not have a HCP and has not made decisions regarding code status--> remains full code

## 2023-03-31 NOTE — H&P ADULT - NSHPPHYSICALEXAM_GEN_ALL_CORE
T(C): 36.6 (03-31-23 @ 18:41), Max: 36.6 (03-31-23 @ 18:41)  HR: 76 (03-31-23 @ 18:41) (76 - 76)  BP: 138/78 (03-31-23 @ 18:41) (138/78 - 138/78)  RR: 20 (03-31-23 @ 18:41) (20 - 20)  SpO2: 97% (03-31-23 @ 18:41) (97% - 97%)    PHYSICAL EXAM:  GENERAL: frail and fatigue appearing   HEAD:  Atraumatic, Normocephalic  EYES: EOMI, PERRLA, conjunctiva and sclera clear  ENT: Moist mucous membranes  NECK: Supple, No JVD  CHEST/LUNG: Clear to auscultation bilaterally; No rales, rhonchi, wheezing, or rubs. Unlabored respirations  HEART: Regular rate and rhythm; No murmurs, rubs, or gallops  ABDOMEN: Bowel sounds present; Soft, Nontender, Nondistended. No hepatomegally  EXTREMITIES:  2+ Peripheral Pulses, brisk capillary refill. No clubbing, cyanosis, or edema  NERVOUS SYSTEM:  Alert & Oriented X3, speech clear. No deficits   MSK: FROM all 4 extremities, full and equal strength  SKIN: No rashes or lesions

## 2023-03-31 NOTE — H&P ADULT - PROBLEM SELECTOR PLAN 6
- C/w Norvasc, Metoprolol, Lisinopril. - Cont plavix   - Hold home atorvastatin given transaminitis.

## 2023-03-31 NOTE — H&P ADULT - HISTORY OF PRESENT ILLNESS
77-year-old male with past medical history of CAD, cardiac stent, CABG x3 vessels, hypertension, hyperlipidemia, newly diagnosed Left renal carcinoma with mets to the lung s/p left lower lung biopsy 1/2023, currently on axitinib 5 mg orally twice daily with chemoinfusion, last chemo was 2 weeks ago, presents to the emergency department by ambulance with report of fall. Patients states that since started chemo he has been feeling more week with difficulty ambulating, difficulty even holding objects at times and easily losing his balance. He also reports decreased appetite for the past few months along with diarrhea for the past 2 weeks. He stood up to go the bathroom today and felt weak/off balance, fell to the ground and had a BM in his pants. He was helped up by son in law and the ambulance was called. He states he hit head but denies hurting other parts of his body and denies any current pain at this time. He denies any dizziness, fainting, fever, chills, HA, cp, sob, N/V/C, blood urine or stool.   Patient was recently admitted 1/2023 for ACS event. At that time imaging studies revealed L renal lesion [id number] cm concerning for malignancy, LLL nodule concern for mets, and L4 lucent lesion c/f bone mets 1.5 x 1.4 cm. He has since started chemotherapy and takes axitinib 5 mg twice daily which he attributes to his new weakness.   Patient states he does not have a HCP and has not made decisions regarding code status.     ED course:   Vitals: T 97.8 HR 76 /78   Labs: Wbc 14.32 H/H 8.4/28.3 PT/INR 16.8/1.63 Na 134 AST//65  Lactate 2.5pro BNP 2256  EKG: NSR HR 72   Given IL Bolus  77-year-old male with past medical history of CAD, cardiac stent, CABG x3 vessels, hypertension, hyperlipidemia, newly diagnosed Left renal carcinoma with mets to the lung s/p left lower lung biopsy 1/2023, currently on axitinib 5 mg orally twice daily with chemoinfusion, last chemo was 2 weeks ago, presents to the emergency department by ambulance with report of fall. Patients states that since started chemo he has been feeling more week with difficulty ambulating, difficulty even holding objects at times and easily losing his balance. He also reports decreased appetite for the past few months along with diarrhea for the past 2 weeks. He stood up to go the bathroom today and felt weak/off balance, fell to the ground and had a BM in his pants. He was helped up by son in law and the ambulance was called. He states he hit head but denies hurting other parts of his body and denies any current pain at this time. He denies any dizziness, fainting, fever, chills, HA, cp, sob, N/V/C, blood urine or stool.   Patient was recently admitted 1/2023 for ACS event. At that time imaging studies revealed L renal lesion [id number] cm concerning for malignancy, LLL nodule concern for mets, and L4 lucent lesion c/f bone mets 1.5 x 1.4 cm. He has since started chemotherapy and takes axitinib 5 mg twice daily which he attributes to his new weakness.   Patient states he does not have a HCP and has not made decisions regarding code status.     ED course:   Vitals: T 97.8 HR 76 /78   Labs: Wbc 14.32 H/H 8.4/28.3 PT/INR 16.8/1.63 Na 134 AST//65  Lactate 2.5--> 2.1 pro BNP 2256  EKG: NSR HR 72   Given IL Bolus  77-year-old male with past medical history of CAD s/p PCI with stent and CABG, hypertension, hyperlipidemia, newly diagnosed Left renal carcinoma with mets to the lung s/p left lower lung biopsy 1/2023, currently on axitinib 5 mg orally twice daily with chemoinfusion, last chemo was 2 weeks ago, presents to the emergency department by ambulance with report of fall. Patients states that since started chemo he has been feeling more week with difficulty ambulating, difficulty even holding objects at times and easily losing his balance. He also reports decreased appetite for the past few months along with diarrhea for the past 2 weeks. He stood up to go the bathroom today and felt weak/off balance, fell to the ground and had a BM in his pants. He was helped up by son in law and the ambulance was called. He states he hit head but denies hurting other parts of his body and denies any current pain at this time. He denies any dizziness, fainting, fever, chills, HA, cp, sob, N/V/C, blood urine or stool.   Patient was recently admitted 1/2023 for ACS event. At that time imaging studies revealed L renal lesion [id number] cm concerning for malignancy, LLL nodule concern for mets, and L4 lucent lesion c/f bone mets 1.5 x 1.4 cm. He has since started chemotherapy and takes axitinib 5 mg twice daily which he attributes to his new weakness.   Patient states he does not have a HCP and has not made decisions regarding code status.     ED course:   Vitals: T 97.8 HR 76 /78   Labs: Wbc 14.32 H/H 8.4/28.3 PT/INR 16.8/1.63 Na 134 AST//65  Lactate 2.5--> 2.1 pro BNP 2256  EKG: NSR HR 72   Given IL Bolus

## 2023-03-31 NOTE — H&P ADULT - ASSESSMENT
77-year-old male with past medical history of CAD, cardiac stent, CABG x3 vessels, hypertension, hyperlipidemia, newly diagnosed Left renal carcinoma with mets to the lung s/p left lower lung biopsy 1/2023, currently on axitinib 5 mg orally twice daily with chemoinfusion, last chemo was 2 weeks ago, presents to the emergency department by ambulance with report of fall. Admit for weakness and leukocytosis.  77-year-old male with past medical history of CAD, cardiac stent, CABG x3 vessels, hypertension, hyperlipidemia, newly diagnosed Left renal carcinoma with mets to the lung s/p left lower lung biopsy 1/2023, currently on axitinib 5 mg orally twice daily with chemoinfusion, last chemo was 2 weeks ago, presents to the emergency department by ambulance with report of fall. Admit for weakness, acute on chronic anemia and leukocytosis.

## 2023-03-31 NOTE — H&P ADULT - PROBLEM SELECTOR PLAN 4
Left RCC with mets to lungs   - On axitnib 5 mg BID with chemoinfusion next appt April 11th, last appt 2 weeks ago   - c/w axitinib 5 mg BID  - cancer and chemo likely causing new weakness  - Heme Onc Dr. Jay consulted fu recs AST/ ALT 65/42  GI  following on previous admission--> noted elevated lfts; liver normal on CT   will HOLD home statin AST/ ALT 65/42  GI  saw on previous admission--> noted elevated lfts; liver normal on CT   will HOLD home statin

## 2023-03-31 NOTE — H&P ADULT - NSHPSOCIALHISTORY_GEN_ALL_CORE
Social alcohol use  Quit tobacco 1/2023; 1/2 pack a day for 30 years, 1 pack a day for 20 years   Lives with wife   Ambulates independently

## 2023-03-31 NOTE — H&P ADULT - PROBLEM SELECTOR PLAN 5
- Cont plavix   - Hold home atorvastatin given transaminitis. Left RCC with mets to lungs   - On axitnib 5 mg BID with chemoinfusion next appt April 11th, last appt 2 weeks ago   - c/w axitinib 5 mg BID  - cancer and chemo likely causing new weakness  - Heme Onc Dr. Jay consulted fu recs

## 2023-03-31 NOTE — H&P ADULT - ATTENDING COMMENTS
77-year-old male with past medical history of CAD, cardiac stent, CABG, hypertension, hyperlipidemia, newly diagnosed Left renal carcinoma with mets to the lung s/p left lower lung biopsy 1/2023, currently on axitinib 5 mg orally twice daily with chemoinfusion, last chemo was 2 weeks ago, presents to the emergency department by ambulance with report of fall. Admit for weakness, acute on chronic anemia and leukocytosis. Admit to medicine. Infectious work up initiated. Follow up blood/urine cultures. Awaiting UA. Follow up iron studies. Ordered for 1U PRBC in the ER for hgb < 9 and concern for symptomatic anemia in cardiovascular disease. Follow up post transfusion CBC. PT consultation. Patient reports decreased appetite. Would consider marinol for FTT. Will consult nutrition. Palliative care consultation. Discussed with patient at bedside.    Agree with H&P as outlined above, edited where appropriate.

## 2023-03-31 NOTE — ED ADULT TRIAGE NOTE - CHIEF COMPLAINT QUOTE
77 yr M BIBEMS from home for general malaise/weakness and reports falling backwards, hit back of head on plastic bench and fell onto buttocks on hardwood. pt reports feeling wobbly on feet after fall. denies LOC/dizziness/blurry vision. pt on aspirin/plavix.

## 2023-03-31 NOTE — ED PROVIDER NOTE - CLINICAL SUMMARY MEDICAL DECISION MAKING FREE TEXT BOX
77 male with generalized weakness, difficulty ambulating, send CBC, CMP, blood cultures, UA urine culture, chest x-ray, IV fluids, orthostatics, EKG and likely admit.

## 2023-04-01 DIAGNOSIS — Z29.9 ENCOUNTER FOR PROPHYLACTIC MEASURES, UNSPECIFIED: ICD-10-CM

## 2023-04-01 LAB
ALBUMIN SERPL ELPH-MCNC: 1 G/DL — LOW (ref 3.3–5)
ALP SERPL-CCNC: 185 U/L — HIGH (ref 40–120)
ALT FLD-CCNC: 32 U/L — SIGNIFICANT CHANGE UP (ref 12–78)
ANION GAP SERPL CALC-SCNC: 6 MMOL/L — SIGNIFICANT CHANGE UP (ref 5–17)
APPEARANCE UR: CLEAR — SIGNIFICANT CHANGE UP
AST SERPL-CCNC: 31 U/L — SIGNIFICANT CHANGE UP (ref 15–37)
BILIRUB SERPL-MCNC: 0.4 MG/DL — SIGNIFICANT CHANGE UP (ref 0.2–1.2)
BILIRUB UR-MCNC: NEGATIVE — SIGNIFICANT CHANGE UP
BUN SERPL-MCNC: 15 MG/DL — SIGNIFICANT CHANGE UP (ref 7–23)
CALCIUM SERPL-MCNC: 8.4 MG/DL — LOW (ref 8.5–10.1)
CHLORIDE SERPL-SCNC: 110 MMOL/L — HIGH (ref 96–108)
CO2 SERPL-SCNC: 22 MMOL/L — SIGNIFICANT CHANGE UP (ref 22–31)
COLOR SPEC: YELLOW — SIGNIFICANT CHANGE UP
CREAT SERPL-MCNC: 0.63 MG/DL — SIGNIFICANT CHANGE UP (ref 0.5–1.3)
DIFF PNL FLD: ABNORMAL
EGFR: 99 ML/MIN/1.73M2 — SIGNIFICANT CHANGE UP
EPI CELLS # UR: SIGNIFICANT CHANGE UP
FERRITIN SERPL-MCNC: 1938 NG/ML — HIGH (ref 30–400)
FOLATE SERPL-MCNC: 14.2 NG/ML — SIGNIFICANT CHANGE UP
GLUCOSE SERPL-MCNC: 77 MG/DL — SIGNIFICANT CHANGE UP (ref 70–99)
GLUCOSE UR QL: NEGATIVE — SIGNIFICANT CHANGE UP
HCT VFR BLD CALC: 27.9 % — LOW (ref 39–50)
HGB BLD-MCNC: 8.7 G/DL — LOW (ref 13–17)
HYALINE CASTS # UR AUTO: ABNORMAL
IRON SATN MFR SERPL: 15 % — LOW (ref 16–55)
IRON SATN MFR SERPL: 23 UG/DL — LOW (ref 45–165)
KETONES UR-MCNC: NEGATIVE — SIGNIFICANT CHANGE UP
LACTATE SERPL-SCNC: 1 MMOL/L — SIGNIFICANT CHANGE UP (ref 0.7–2)
LEUKOCYTE ESTERASE UR-ACNC: ABNORMAL
MCHC RBC-ENTMCNC: 24.2 PG — LOW (ref 27–34)
MCHC RBC-ENTMCNC: 31.2 GM/DL — LOW (ref 32–36)
MCV RBC AUTO: 77.5 FL — LOW (ref 80–100)
NITRITE UR-MCNC: NEGATIVE — SIGNIFICANT CHANGE UP
NRBC # BLD: 0 /100 WBCS — SIGNIFICANT CHANGE UP (ref 0–0)
PH UR: 6 — SIGNIFICANT CHANGE UP (ref 5–8)
PLATELET # BLD AUTO: 393 K/UL — SIGNIFICANT CHANGE UP (ref 150–400)
POTASSIUM SERPL-MCNC: 3.5 MMOL/L — SIGNIFICANT CHANGE UP (ref 3.5–5.3)
POTASSIUM SERPL-SCNC: 3.5 MMOL/L — SIGNIFICANT CHANGE UP (ref 3.5–5.3)
PROT SERPL-MCNC: 7 G/DL — SIGNIFICANT CHANGE UP (ref 6–8.3)
PROT UR-MCNC: 30 MG/DL
RBC # BLD: 3.6 M/UL — LOW (ref 4.2–5.8)
RBC # FLD: 22.4 % — HIGH (ref 10.3–14.5)
RBC CASTS # UR COMP ASSIST: ABNORMAL /HPF (ref 0–4)
SODIUM SERPL-SCNC: 138 MMOL/L — SIGNIFICANT CHANGE UP (ref 135–145)
SP GR SPEC: 1.01 — SIGNIFICANT CHANGE UP (ref 1.01–1.02)
TIBC SERPL-MCNC: 148 UG/DL — LOW (ref 220–430)
TRANSFERRIN SERPL-MCNC: 127 MG/DL — LOW (ref 200–360)
UIBC SERPL-MCNC: 126 UG/DL — SIGNIFICANT CHANGE UP (ref 110–370)
UROBILINOGEN FLD QL: NEGATIVE — SIGNIFICANT CHANGE UP
VIT B12 SERPL-MCNC: 1276 PG/ML — HIGH (ref 232–1245)
WBC # BLD: 13.21 K/UL — HIGH (ref 3.8–10.5)
WBC # FLD AUTO: 13.21 K/UL — HIGH (ref 3.8–10.5)
WBC UR QL: SIGNIFICANT CHANGE UP

## 2023-04-01 PROCEDURE — 99223 1ST HOSP IP/OBS HIGH 75: CPT

## 2023-04-01 PROCEDURE — 93970 EXTREMITY STUDY: CPT | Mod: 26

## 2023-04-01 PROCEDURE — 99233 SBSQ HOSP IP/OBS HIGH 50: CPT

## 2023-04-01 RX ORDER — ATORVASTATIN CALCIUM 80 MG/1
80 TABLET, FILM COATED ORAL AT BEDTIME
Refills: 0 | Status: DISCONTINUED | OUTPATIENT
Start: 2023-04-01 | End: 2023-04-04

## 2023-04-01 RX ORDER — INFLUENZA VIRUS VACCINE 15; 15; 15; 15 UG/.5ML; UG/.5ML; UG/.5ML; UG/.5ML
0.7 SUSPENSION INTRAMUSCULAR ONCE
Refills: 0 | Status: DISCONTINUED | OUTPATIENT
Start: 2023-04-01 | End: 2023-04-04

## 2023-04-01 RX ORDER — MIRTAZAPINE 45 MG/1
15 TABLET, ORALLY DISINTEGRATING ORAL AT BEDTIME
Refills: 0 | Status: DISCONTINUED | OUTPATIENT
Start: 2023-04-01 | End: 2023-04-04

## 2023-04-01 RX ADMIN — MIRTAZAPINE 15 MILLIGRAM(S): 45 TABLET, ORALLY DISINTEGRATING ORAL at 21:54

## 2023-04-01 RX ADMIN — Medication 81 MILLIGRAM(S): at 14:20

## 2023-04-01 RX ADMIN — ATORVASTATIN CALCIUM 80 MILLIGRAM(S): 80 TABLET, FILM COATED ORAL at 21:54

## 2023-04-01 RX ADMIN — Medication 3 MILLIGRAM(S): at 22:52

## 2023-04-01 RX ADMIN — LISINOPRIL 20 MILLIGRAM(S): 2.5 TABLET ORAL at 05:53

## 2023-04-01 RX ADMIN — Medication 200 MILLIGRAM(S): at 05:52

## 2023-04-01 RX ADMIN — AMLODIPINE BESYLATE 10 MILLIGRAM(S): 2.5 TABLET ORAL at 05:53

## 2023-04-01 RX ADMIN — SODIUM CHLORIDE 70 MILLILITER(S): 9 INJECTION INTRAMUSCULAR; INTRAVENOUS; SUBCUTANEOUS at 06:10

## 2023-04-01 RX ADMIN — ISOSORBIDE MONONITRATE 30 MILLIGRAM(S): 60 TABLET, EXTENDED RELEASE ORAL at 14:16

## 2023-04-01 RX ADMIN — CLOPIDOGREL BISULFATE 75 MILLIGRAM(S): 75 TABLET, FILM COATED ORAL at 14:17

## 2023-04-01 NOTE — CONSULT NOTE ADULT - ASSESSMENT
77-year-old male with past medical history of CAD, cardiac stent, CABG x3 vessels, hypertension, hyperlipidemia, newly diagnosed Left renal carcinoma with mets to the lung s/p left lower lung biopsy 1/2023, currently on axitinib 5 mg orally twice daily with chemoinfusion, last chemo was 2 weeks ago. Patient presents to the emergency department by ambulance with report of fall. He reports since his chemotherapy he has been more weak with difficulty ambulating and easily losing his balance. Associated with decreased appetite, and recently having diarrhea.  In the ER he was afebrile, leukocytosis to 14k. Admit for weakness, acute on chronic anemia and leukocytosis. ID consulted for concern for Sepsis.       Concern for Infection  Leukocytosis   Lactic acidosis       - Blood cultures pending  - RVP/COVID 19 PCR 3/31 negative   - Urine culture pending   - CXR reporting no PNA   - UA negative for UTI   - US Duplex LE reporting no DVT   - Procalcitonin level ordered   - Continue to monitor off antibiotics  - If lactic acid continues to rise would check CT A/P   - Follow up cultures  - Trend Fever  - Trend WBC      Thank you for allowing me to participate in the care of your patient.   Will Follow  I am available on teams for any questions

## 2023-04-01 NOTE — CONSULT NOTE ADULT - ASSESSMENT
Metastatic renal cell carcinoma on axinitib and keytruda [place] [facility] [place]  Has received 1 cycle of treatment.  Now admitted after a fall.  complains of weakness and difficulty walking.  ?balance/ataxia related to treatement.  Head CT without obvious evidence of metastatic disease    Recommendations:  1.  follow CBC  2.  hold treatment and assess response to symptoms. if no improvement may consider steroids if feel related to immunotherapy  3.  continue medical evaluation  4.  discussed with patient's daughter

## 2023-04-01 NOTE — DIETITIAN INITIAL EVALUATION ADULT - PERTINENT MEDS FT
MEDICATIONS  (STANDING):  amLODIPine   Tablet 10 milliGRAM(s) Oral daily  aspirin  chewable 81 milliGRAM(s) Oral daily  Axitinib 5 mG/Dose 1 Tablet(s) Oral two times a day  clopidogrel Tablet 75 milliGRAM(s) Oral daily  influenza  Vaccine (HIGH DOSE) 0.7 milliLiter(s) IntraMuscular once  isosorbide   mononitrate ER Tablet (IMDUR) 30 milliGRAM(s) Oral daily  lisinopril 20 milliGRAM(s) Oral daily  metoprolol succinate  milliGRAM(s) Oral daily  mirtazapine 15 milliGRAM(s) Oral at bedtime  sodium chloride 0.9%. 1000 milliLiter(s) (70 mL/Hr) IV Continuous <Continuous>    MEDICATIONS  (PRN):  acetaminophen     Tablet .. 650 milliGRAM(s) Oral every 6 hours PRN Temp greater or equal to 38C (100.4F), Mild Pain (1 - 3)  aluminum hydroxide/magnesium hydroxide/simethicone Suspension 30 milliLiter(s) Oral every 4 hours PRN Dyspepsia  melatonin 3 milliGRAM(s) Oral at bedtime PRN Insomnia  ondansetron Injectable 4 milliGRAM(s) IV Push every 8 hours PRN Nausea and/or Vomiting

## 2023-04-01 NOTE — DIETITIAN INITIAL EVALUATION ADULT - ORAL INTAKE PTA/DIET HISTORY
Pt with poor appetite for a few months since chemo.  For the past month has only been eating 1 meal a day (dinner).  Is drinking 1 ensure shake a day @ home. Reports ht of ~5'8".   Endorsed wt of ~152#, denying wt loss.  Does not like fish.

## 2023-04-01 NOTE — PHYSICAL THERAPY INITIAL EVALUATION ADULT - ADDITIONAL COMMENTS
Pt lives w/ his partner in a house, no steps. Pt is an independent ambulator without device and independent with ADLs. Pt lives w/ his partner in a house, no steps. Pt is an independent ambulator without device and independent with ADLs. Pt w/ c/o recent falls at home and weakness as the day progresses

## 2023-04-01 NOTE — PHYSICAL THERAPY INITIAL EVALUATION ADULT - PERTINENT HX OF CURRENT PROBLEM, REHAB EVAL
76 y/o male adm 3/31  with past medical history of CAD, cardiac stent, CABG x3 vessels, hypertension, hyperlipidemia, newly diagnosed Left renal carcinoma with mets to the lung s/p left lower lung biopsy 1/2023, currently on axitinib 5 mg orally twice daily with chemoinfusion, last chemo was 2 weeks ago, presents to the emergency department by ambulance with report of fall. Admit for weakness, acute on chronic anemia and leukocytosis.

## 2023-04-01 NOTE — DIETITIAN INITIAL EVALUATION ADULT - PHYSCIAL ASSESSMENT
slightly.  Pt denies wt changes/loss however find this questionable with poor meal intake for > 1 mo./underweight

## 2023-04-01 NOTE — PATIENT PROFILE ADULT - NSTRANSFERBELONGINGSRESP_GEN_A_NUR
Pt relates abscess on crease of back rt glute/thigh for ~4 weeks, states he's noted small amounts of drainage.       Margarita Diop RN  01/21/21 [id number]
yes

## 2023-04-01 NOTE — PHYSICAL THERAPY INITIAL EVALUATION ADULT - PLANNED THERAPY INTERVENTIONS, PT EVAL
Pt is functionally independent and requires no skilled physical therapy needs. Will discharge from physical therapy secondary to patient independent bed mobility training/gait training/transfer training

## 2023-04-01 NOTE — DIETITIAN INITIAL EVALUATION ADULT - PROBLEM SELECTOR PLAN 2
- Patient with leukocytosis (wbc 14.32) and elevated lactate may be 2/2 to cancer. Awaiting UA.  - Does not meet sepsis criteria on admission   - Leukocytosis may be in setting of cancer/chemo vs reactive 2/2 to recent fall   - F/u UA, UCx, BCx x2  - Trend WBC and monitor for fever

## 2023-04-01 NOTE — DIETITIAN INITIAL EVALUATION ADULT - PROBLEM SELECTOR PLAN 4
AST/ ALT 65/42  GI  saw on previous admission--> noted elevated lfts; liver normal on CT   will HOLD home statin

## 2023-04-01 NOTE — PROGRESS NOTE ADULT - PROBLEM SELECTOR PLAN 1
Patient presents s/p fall 2/2 to weakness denies syncope  - CT head ordered- no intracranial pathology noted  - Weakness likely secondary to started chemotherapy treatment and/or infectious etiology   - Also complains of diarrhea had BM prior to fall; f/u GI stool PCR   - Patient with leukocytosis (wbc 14.32)  and elevated lactate may be 2/2 to cancer ; unclear source of infection  - Does not meet sepsis criteria on admission   - Given IV NS 1 L bolus x1 in ED  - Continue IV NS   - Trend WBC and monitor for fever  - F/u UA, UCx, BCx x2  - ID Dr. Almanzar consulted   - PT consulted- recommending home PT Patient presents s/p fall 2/2 to weakness denies syncope  - CT head ordered- no intracranial pathology noted  - Weakness likely secondary to started chemotherapy treatment and/or infectious etiology   - Also complains of diarrhea had BM prior to fall; f/u GI stool PCR   - Patient with leukocytosis (wbc 14.32)  and elevated lactate may be 2/2 to cancer ; unclear source of infection  - Does not meet sepsis criteria on admission   - Given IV NS 1 L bolus x1 in ED  - Continue IV NS   - Trend WBC and monitor for fever  - F/u UA, UCx, BCx x2  - ID Dr. Almanzar consulted   - PT consulted- recommending home PT  -Complaining of decreased appetite, will trial mirtazapine 15mg QHS

## 2023-04-01 NOTE — PHYSICAL THERAPY INITIAL EVALUATION ADULT - FUNCTIONAL LIMITATIONS, PT EVAL
Occupational Therapy  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 Completed PROM of RUE.     1. Patient/Caregiver will be independent with home exercise program ---   2. Post manual techniques, pt will maintain right wrist and hand in neutral deviation, 20 ° wrist extension, and resting hand position via splinting PRN. ---   3. Pt will engage in R tricep/wrist extensor mm strengthening activities to decrease flexor tone. ---  4. Pt will display appropriate R hand finger extension to match object size following NDT and sensory input, 80% of trials. ---0x  5. Pt will assume RUE supination with elbow flexed given mod assist.--- max  6. Pt will display R radial palmar grasp on 1\" size objects, 50% of trials. ---Physical assist to encourage grasping with fingers. 7. Post  AROM exercises, pt will demo increased wrist AROM flexion/extension by 10 degrees. ---  8. Pt will form the letters E, F, T, L, I, and H appropriately 80% of the time with visual model, 3x.---  9. Pt will translate 1'' objects palm to finger with L hand, 50% of the time without utilizing compensatory strategies 3/4 trials.  --- Pt completed FM bilateral coordination task encouraging overall coordination and
self-care/home management

## 2023-04-01 NOTE — PROGRESS NOTE ADULT - ASSESSMENT
77-year-old male with past medical history of CAD, cardiac stent, CABG x3 vessels, hypertension, hyperlipidemia, newly diagnosed Left renal carcinoma with mets to the lung s/p left lower lung biopsy 1/2023, currently on axitinib 5 mg orally twice daily with chemoinfusion, last chemo was 2 weeks ago, presents to the emergency department by ambulance with report of fall. Admit for weakness, acute on chronic anemia and leukocytosis.

## 2023-04-01 NOTE — PHYSICAL THERAPY INITIAL EVALUATION ADULT - GAIT DEVIATIONS NOTED, PT EVAL
steady gait. pt c/o SOB- SPO2 97%  on room air w/ ambulation pt c/o SOB- SPO2 97%  on room air w/ ambulation/decreased lynnette

## 2023-04-01 NOTE — PATIENT PROFILE ADULT - FALL HARM RISK - UNIVERSAL INTERVENTIONS
Bed in lowest position, wheels locked, appropriate side rails in place/Call bell, personal items and telephone in reach/Instruct patient to call for assistance before getting out of bed or chair/Non-slip footwear when patient is out of bed/[place] to call system/Physically safe environment - no spills, clutter or unnecessary equipment/Purposeful Proactive Rounding/Room/bathroom lighting operational, light cord in reach

## 2023-04-01 NOTE — DIETITIAN INITIAL EVALUATION ADULT - PROBLEM SELECTOR PLAN 5
Left RCC with mets to lungs   - On axitnib 5 mg BID with chemoinfusion next appt April 11th, last appt 2 weeks ago   - c/w axitinib 5 mg BID  - cancer and chemo likely causing new weakness  - Heme Onc Dr. Jay consulted fu recs

## 2023-04-01 NOTE — DIETITIAN INITIAL EVALUATION ADULT - REASON FOR ADMISSION
Anemia in neoplastic disease    "77-year-old male with past medical history of CAD, cardiac stent, CABG x3 vessels, hypertension, hyperlipidemia, newly diagnosed Left renal carcinoma with mets to the lung s/p left lower lung biopsy 1/2023, currently on axitinib 5 mg orally twice daily with chemoinfusion, last chemo was 2 weeks ago, presents to the emergency department by ambulance with report of fall. Admit for weakness, acute on chronic anemia and leukocytosis."

## 2023-04-01 NOTE — DIETITIAN INITIAL EVALUATION ADULT - PROBLEM SELECTOR PLAN 3
Plan: Hb 8.4, previous baseline used to 14 before cancer diagnosis   - maybe related to renal mass, possible anemia of chronic disease related to an underlying renal malignancy  - iron studies ordered  - being transfused 1U PRBC  - GI  following on previous admission with Hb 8.6--> no overt gi bleeding  - Heme Onc Dr. Jay consulted fu recs

## 2023-04-01 NOTE — DIETITIAN INITIAL EVALUATION ADULT - PROBLEM SELECTOR PLAN 1
Patient presents s/p fall 2/2 to weakness denies syncope  - CT head ordered f/u results   - Weakness likely secondary to started chemotherapy treatment and/or infectious etiology   - Also complains of diarrhea had BM prior to fall; f/u GI stool PCR   - Patient with leukocytosis (wbc 14.32)  and elevated lactate may be 2/2 to cancer ; unclear source of infection  - Does not meet sepsis criteria on admission   - Given IV NS 1 L bolus x1 in ED  - Continue IV NS   - Lactate 2.5 on admission, f/u repeat lactate (6 hours later)  - Trend WBC and monitor for fever  - F/u UA, UCx, BCx x2  - ID Dr. Almanzar consulted   - PT consulted

## 2023-04-01 NOTE — DIETITIAN INITIAL EVALUATION ADULT - PERTINENT LABORATORY DATA
04-01    138  |  110<H>  |  15  ----------------------------<  77  3.5   |  22  |  0.63    Ca    8.4<L>      01 Apr 2023 06:05  Mg     2.6     03-31    TPro  7.0  /  Alb  1.0<L>  /  TBili  0.4  /  DBili  x   /  AST  31  /  ALT  32  /  AlkPhos  185<H>  04-01

## 2023-04-01 NOTE — DIETITIAN INITIAL EVALUATION ADULT - PROBLEM SELECTOR PLAN 9
VTE ppx: SCDs in setting of anemia    #GOC  Patient states he does not have a HCP and has not made decisions regarding code status--> remains full code

## 2023-04-02 LAB
ALBUMIN SERPL ELPH-MCNC: 1 G/DL — LOW (ref 3.3–5)
ALP SERPL-CCNC: 197 U/L — HIGH (ref 40–120)
ALT FLD-CCNC: 34 U/L — SIGNIFICANT CHANGE UP (ref 12–78)
ANION GAP SERPL CALC-SCNC: 5 MMOL/L — SIGNIFICANT CHANGE UP (ref 5–17)
AST SERPL-CCNC: 36 U/L — SIGNIFICANT CHANGE UP (ref 15–37)
BASOPHILS # BLD AUTO: 0.02 K/UL — SIGNIFICANT CHANGE UP (ref 0–0.2)
BASOPHILS NFR BLD AUTO: 0.2 % — SIGNIFICANT CHANGE UP (ref 0–2)
BILIRUB SERPL-MCNC: 0.4 MG/DL — SIGNIFICANT CHANGE UP (ref 0.2–1.2)
BUN SERPL-MCNC: 14 MG/DL — SIGNIFICANT CHANGE UP (ref 7–23)
CALCIUM SERPL-MCNC: 8.2 MG/DL — LOW (ref 8.5–10.1)
CHLORIDE SERPL-SCNC: 110 MMOL/L — HIGH (ref 96–108)
CO2 SERPL-SCNC: 23 MMOL/L — SIGNIFICANT CHANGE UP (ref 22–31)
CREAT SERPL-MCNC: 0.77 MG/DL — SIGNIFICANT CHANGE UP (ref 0.5–1.3)
CULTURE RESULTS: SIGNIFICANT CHANGE UP
EGFR: 93 ML/MIN/1.73M2 — SIGNIFICANT CHANGE UP
EOSINOPHIL # BLD AUTO: 0.06 K/UL — SIGNIFICANT CHANGE UP (ref 0–0.5)
EOSINOPHIL NFR BLD AUTO: 0.5 % — SIGNIFICANT CHANGE UP (ref 0–6)
GLUCOSE SERPL-MCNC: 87 MG/DL — SIGNIFICANT CHANGE UP (ref 70–99)
HCT VFR BLD CALC: 28.2 % — LOW (ref 39–50)
HGB BLD-MCNC: 8.4 G/DL — LOW (ref 13–17)
IMM GRANULOCYTES NFR BLD AUTO: 1.1 % — HIGH (ref 0–0.9)
LACTATE SERPL-SCNC: 1.6 MMOL/L — SIGNIFICANT CHANGE UP (ref 0.7–2)
LYMPHOCYTES # BLD AUTO: 1.36 K/UL — SIGNIFICANT CHANGE UP (ref 1–3.3)
LYMPHOCYTES # BLD AUTO: 10.7 % — LOW (ref 13–44)
MCHC RBC-ENTMCNC: 23.5 PG — LOW (ref 27–34)
MCHC RBC-ENTMCNC: 29.8 GM/DL — LOW (ref 32–36)
MCV RBC AUTO: 78.8 FL — LOW (ref 80–100)
MONOCYTES # BLD AUTO: 0.79 K/UL — SIGNIFICANT CHANGE UP (ref 0–0.9)
MONOCYTES NFR BLD AUTO: 6.2 % — SIGNIFICANT CHANGE UP (ref 2–14)
NEUTROPHILS # BLD AUTO: 10.35 K/UL — HIGH (ref 1.8–7.4)
NEUTROPHILS NFR BLD AUTO: 81.3 % — HIGH (ref 43–77)
NRBC # BLD: 0 /100 WBCS — SIGNIFICANT CHANGE UP (ref 0–0)
PLATELET # BLD AUTO: 422 K/UL — HIGH (ref 150–400)
POTASSIUM SERPL-MCNC: 3.5 MMOL/L — SIGNIFICANT CHANGE UP (ref 3.5–5.3)
POTASSIUM SERPL-SCNC: 3.5 MMOL/L — SIGNIFICANT CHANGE UP (ref 3.5–5.3)
PROCALCITONIN SERPL-MCNC: 0.47 NG/ML — HIGH
PROT SERPL-MCNC: 7.2 G/DL — SIGNIFICANT CHANGE UP (ref 6–8.3)
RBC # BLD: 3.58 M/UL — LOW (ref 4.2–5.8)
RBC # FLD: 22.8 % — HIGH (ref 10.3–14.5)
SODIUM SERPL-SCNC: 138 MMOL/L — SIGNIFICANT CHANGE UP (ref 135–145)
SPECIMEN SOURCE: SIGNIFICANT CHANGE UP
WBC # BLD: 12.72 K/UL — HIGH (ref 3.8–10.5)
WBC # FLD AUTO: 12.72 K/UL — HIGH (ref 3.8–10.5)

## 2023-04-02 PROCEDURE — 99233 SBSQ HOSP IP/OBS HIGH 50: CPT

## 2023-04-02 PROCEDURE — 99232 SBSQ HOSP IP/OBS MODERATE 35: CPT

## 2023-04-02 RX ADMIN — ATORVASTATIN CALCIUM 80 MILLIGRAM(S): 80 TABLET, FILM COATED ORAL at 22:09

## 2023-04-02 RX ADMIN — LISINOPRIL 20 MILLIGRAM(S): 2.5 TABLET ORAL at 05:26

## 2023-04-02 RX ADMIN — ISOSORBIDE MONONITRATE 30 MILLIGRAM(S): 60 TABLET, EXTENDED RELEASE ORAL at 12:54

## 2023-04-02 RX ADMIN — CLOPIDOGREL BISULFATE 75 MILLIGRAM(S): 75 TABLET, FILM COATED ORAL at 12:54

## 2023-04-02 RX ADMIN — MIRTAZAPINE 15 MILLIGRAM(S): 45 TABLET, ORALLY DISINTEGRATING ORAL at 22:09

## 2023-04-02 RX ADMIN — Medication 200 MILLIGRAM(S): at 05:26

## 2023-04-02 RX ADMIN — Medication 81 MILLIGRAM(S): at 12:54

## 2023-04-02 RX ADMIN — AMLODIPINE BESYLATE 10 MILLIGRAM(S): 2.5 TABLET ORAL at 05:26

## 2023-04-02 NOTE — PROGRESS NOTE ADULT - PROBLEM SELECTOR PLAN 1
Patient presents s/p fall 2/2 to weakness denies syncope  - CT head ordered- no intracranial pathology noted  - Weakness likely secondary to started chemotherapy treatment and/or infectious etiology   - Also complains of diarrhea had BM prior to fall; f/u GI stool PCR   - Patient with leukocytosis (wbc 14.32)  and elevated lactate on admission may be 2/2 to cancer ; unclear source of infection  - Does not meet sepsis criteria on admission   - Given IV NS 1 L bolus x1 in ED  - Trend WBC and monitor for fever  - F/u UA, UCx, BCx x2  - ID Dr. Almanzar consulted   - PT consulted- recommending home PT, weaker today, will reassess in AM  -Complaining of decreased appetite, will trial mirtazapine 15mg QHS

## 2023-04-02 NOTE — PROGRESS NOTE ADULT - ASSESSMENT
Metastatic renal cell carcinoma on axinitib and keytruda at [facility] [place].  Has received 1 cycle of treatment.  Now admitted after a fall.  complains of weakness and difficulty walking.  ?balance/ataxia related to treatement.  Head CT without obvious evidence of metastatic disease    Recommendations:  1.  follow CBC  2.  hold treatment and assess response to symptoms. if no improvement may consider steroids if feel related to immunotherapy  3.  continue medical evaluation  4.  d/c axitinib  4.  discussed with patient's daughter

## 2023-04-02 NOTE — PROGRESS NOTE ADULT - ASSESSMENT
77-year-old male with past medical history of CAD, cardiac stent, CABG x3 vessels, hypertension, hyperlipidemia, newly diagnosed Left renal carcinoma with mets to the lung s/p left lower lung biopsy 1/2023, currently on axitinib 5 mg orally twice daily with chemoinfusion, last chemo was 2 weeks ago. Patient presents to the emergency department by ambulance with report of fall. He reports since his chemotherapy he has been more weak with difficulty ambulating and easily losing his balance. Associated with decreased appetite, and recently having diarrhea.  In the ER he was afebrile, leukocytosis to 14k. Admit for weakness, acute on chronic anemia and leukocytosis. ID consulted for concern for Sepsis.       Concern for Infection  Leukocytosis   Lactic acidosis       - Blood cultures 3/31 no growth   - RVP/COVID 19 PCR 3/31 negative   - Urine culture pending   - CXR reporting no PNA   - UA negative for UTI   - US Duplex LE reporting no DVT   - Procalcitonin level pending   - Continue to monitor off antibiotics  - lactic acid level improved   - Follow up cultures  - Trend Fever  - Trend WBC      Will Follow  I am available on teams for any questions

## 2023-04-03 LAB
ALBUMIN SERPL ELPH-MCNC: 1.1 G/DL — LOW (ref 3.3–5)
ALP SERPL-CCNC: 204 U/L — HIGH (ref 40–120)
ALT FLD-CCNC: 30 U/L — SIGNIFICANT CHANGE UP (ref 12–78)
ANION GAP SERPL CALC-SCNC: 5 MMOL/L — SIGNIFICANT CHANGE UP (ref 5–17)
AST SERPL-CCNC: 35 U/L — SIGNIFICANT CHANGE UP (ref 15–37)
BASOPHILS # BLD AUTO: 0.03 K/UL — SIGNIFICANT CHANGE UP (ref 0–0.2)
BASOPHILS NFR BLD AUTO: 0.2 % — SIGNIFICANT CHANGE UP (ref 0–2)
BILIRUB SERPL-MCNC: 0.4 MG/DL — SIGNIFICANT CHANGE UP (ref 0.2–1.2)
BUN SERPL-MCNC: 16 MG/DL — SIGNIFICANT CHANGE UP (ref 7–23)
CALCIUM SERPL-MCNC: 8.5 MG/DL — SIGNIFICANT CHANGE UP (ref 8.5–10.1)
CHLORIDE SERPL-SCNC: 110 MMOL/L — HIGH (ref 96–108)
CO2 SERPL-SCNC: 24 MMOL/L — SIGNIFICANT CHANGE UP (ref 22–31)
CREAT SERPL-MCNC: 0.85 MG/DL — SIGNIFICANT CHANGE UP (ref 0.5–1.3)
EGFR: 90 ML/MIN/1.73M2 — SIGNIFICANT CHANGE UP
EOSINOPHIL # BLD AUTO: 0.07 K/UL — SIGNIFICANT CHANGE UP (ref 0–0.5)
EOSINOPHIL NFR BLD AUTO: 0.6 % — SIGNIFICANT CHANGE UP (ref 0–6)
GLUCOSE SERPL-MCNC: 90 MG/DL — SIGNIFICANT CHANGE UP (ref 70–99)
HCT VFR BLD CALC: 31.3 % — LOW (ref 39–50)
HGB BLD-MCNC: 9.2 G/DL — LOW (ref 13–17)
IMM GRANULOCYTES NFR BLD AUTO: 1.7 % — HIGH (ref 0–0.9)
LYMPHOCYTES # BLD AUTO: 1.25 K/UL — SIGNIFICANT CHANGE UP (ref 1–3.3)
LYMPHOCYTES # BLD AUTO: 9.9 % — LOW (ref 13–44)
MCHC RBC-ENTMCNC: 23.2 PG — LOW (ref 27–34)
MCHC RBC-ENTMCNC: 29.4 GM/DL — LOW (ref 32–36)
MCV RBC AUTO: 79 FL — LOW (ref 80–100)
MONOCYTES # BLD AUTO: 0.71 K/UL — SIGNIFICANT CHANGE UP (ref 0–0.9)
MONOCYTES NFR BLD AUTO: 5.6 % — SIGNIFICANT CHANGE UP (ref 2–14)
NEUTROPHILS # BLD AUTO: 10.3 K/UL — HIGH (ref 1.8–7.4)
NEUTROPHILS NFR BLD AUTO: 82 % — HIGH (ref 43–77)
NRBC # BLD: 0 /100 WBCS — SIGNIFICANT CHANGE UP (ref 0–0)
PLATELET # BLD AUTO: 418 K/UL — HIGH (ref 150–400)
POTASSIUM SERPL-MCNC: 3.7 MMOL/L — SIGNIFICANT CHANGE UP (ref 3.5–5.3)
POTASSIUM SERPL-SCNC: 3.7 MMOL/L — SIGNIFICANT CHANGE UP (ref 3.5–5.3)
PROT SERPL-MCNC: 7.3 G/DL — SIGNIFICANT CHANGE UP (ref 6–8.3)
RBC # BLD: 3.96 M/UL — LOW (ref 4.2–5.8)
RBC # FLD: 23 % — HIGH (ref 10.3–14.5)
SODIUM SERPL-SCNC: 139 MMOL/L — SIGNIFICANT CHANGE UP (ref 135–145)
WBC # BLD: 12.58 K/UL — HIGH (ref 3.8–10.5)
WBC # FLD AUTO: 12.58 K/UL — HIGH (ref 3.8–10.5)

## 2023-04-03 PROCEDURE — 99232 SBSQ HOSP IP/OBS MODERATE 35: CPT

## 2023-04-03 PROCEDURE — 99223 1ST HOSP IP/OBS HIGH 75: CPT

## 2023-04-03 PROCEDURE — 99233 SBSQ HOSP IP/OBS HIGH 50: CPT

## 2023-04-03 RX ORDER — ACETAMINOPHEN 500 MG
325 TABLET ORAL ONCE
Refills: 0 | Status: COMPLETED | OUTPATIENT
Start: 2023-04-03 | End: 2023-04-03

## 2023-04-03 RX ORDER — DIPHENHYDRAMINE HCL 50 MG
25 CAPSULE ORAL ONCE
Refills: 0 | Status: COMPLETED | OUTPATIENT
Start: 2023-04-03 | End: 2023-04-03

## 2023-04-03 RX ORDER — ACETAMINOPHEN 500 MG
650 TABLET ORAL ONCE
Refills: 0 | Status: COMPLETED | OUTPATIENT
Start: 2023-04-03 | End: 2023-04-03

## 2023-04-03 RX ADMIN — Medication 325 MILLIGRAM(S): at 21:01

## 2023-04-03 RX ADMIN — Medication 81 MILLIGRAM(S): at 13:15

## 2023-04-03 RX ADMIN — Medication 325 MILLIGRAM(S): at 18:57

## 2023-04-03 RX ADMIN — LISINOPRIL 20 MILLIGRAM(S): 2.5 TABLET ORAL at 06:05

## 2023-04-03 RX ADMIN — Medication 25 MILLIGRAM(S): at 22:48

## 2023-04-03 RX ADMIN — MIRTAZAPINE 15 MILLIGRAM(S): 45 TABLET, ORALLY DISINTEGRATING ORAL at 21:03

## 2023-04-03 RX ADMIN — Medication 3 MILLIGRAM(S): at 21:03

## 2023-04-03 RX ADMIN — Medication 25 MILLIGRAM(S): at 18:57

## 2023-04-03 RX ADMIN — Medication 650 MILLIGRAM(S): at 22:47

## 2023-04-03 RX ADMIN — AMLODIPINE BESYLATE 10 MILLIGRAM(S): 2.5 TABLET ORAL at 06:06

## 2023-04-03 RX ADMIN — Medication 650 MILLIGRAM(S): at 23:59

## 2023-04-03 RX ADMIN — CLOPIDOGREL BISULFATE 75 MILLIGRAM(S): 75 TABLET, FILM COATED ORAL at 13:15

## 2023-04-03 RX ADMIN — ATORVASTATIN CALCIUM 80 MILLIGRAM(S): 80 TABLET, FILM COATED ORAL at 21:03

## 2023-04-03 RX ADMIN — Medication 200 MILLIGRAM(S): at 06:05

## 2023-04-03 RX ADMIN — ISOSORBIDE MONONITRATE 30 MILLIGRAM(S): 60 TABLET, EXTENDED RELEASE ORAL at 13:15

## 2023-04-03 NOTE — PROGRESS NOTE ADULT - PROBLEM SELECTOR PLAN 2
- Patient with leukocytosis (wbc 14.32) and elevated lactate may be 2/2 to cancer. Awaiting UA.  - Does not meet sepsis criteria on admission   - Leukocytosis may be in setting of cancer/chemo vs reactive 2/2 to recent fall   - F/u UA, UCx, BCx x2  - Trend WBC and monitor for fever
- Patient with leukocytosis (wbc 14.32) and elevated lactate may be 2/2 to cancer.   - Does not meet sepsis criteria on admission   - Leukocytosis may be in setting of cancer/chemo vs reactive 2/2 to recent fall   - F/u UA, UCx, BCx x2- neg  - Trend WBC and monitor for fever  -Improving, monitor off abx for now
- Patient with leukocytosis (wbc 14.32) and elevated lactate may be 2/2 to cancer.   - Does not meet sepsis criteria on admission   - Leukocytosis may be in setting of cancer/chemo vs reactive 2/2 to recent fall   - F/u UA, UCx, BCx x2  - Trend WBC and monitor for fever  -Improving, monitor off abx for now

## 2023-04-03 NOTE — CONSULT NOTE ADULT - SUBJECTIVE AND OBJECTIVE BOX
[facility]  INFECTIOUS DISEASES   [address] [place] [address]  Tel: [phone]     Fax: [phone]  =======================================================      [id number]  LOUIE SCHROEDER    CC: Patient is a 77y old  Male who presents with a chief complaint of Anemia in neoplastic disease    77-year-old male with past medical history of CAD, cardiac stent, CABG x3 vessels, hypertension, hyperlipidemia, newly diagnosed Left renal carcinoma with mets to the lung s/p left lower lung biopsy 2023, currently on axitinib 5 mg orally twice daily with chemoinfusion, last chemo was 2 weeks ago. Patient presents to the emergency department by ambulance with report of fall. He reports since his chemotherapy he has been more weak with difficulty ambulating and easily losing his balance. Associated with decreased appetite, and recently having diarrhea.  In the ER he was afebrile, leukocytosis to 14k. Admit for weakness, acute on chronic anemia and leukocytosis. ID consulted for concern for Sepsis.       Past Medical & Surgical Hx:  HTN - Hypertension  H/O hyperlipidemia  CAD, multiple vessel  Renal colic  Hypertension  S/P CABG x 3   S/P coronary artery stent placement 2 stents   S/P knee surgery arthroscopic b/l  S/P hernia repair  S/P CABG x 4  S/P coronary artery stent placement      Social Hx:  Former smoker       FAMILY HISTORY:  FH: cardiovascular disease (Father)      Allergies  No Known Allergies      REVIEW OF SYSTEMS:  CONSTITUTIONAL:  No Fever or chills  HEENT:  No diplopia or blurred vision.  No earache, sore throat or runny nose.  CARDIOVASCULAR:  No chest pain  RESPIRATORY:  No cough, shortness of breath  GASTROINTESTINAL:  No nausea, vomiting. + diarrhea.  GENITOURINARY:  No dysuria, frequency or urgency. No Blood in urine  MUSCULOSKELETAL:  no joint aches, no muscle pain  SKIN:  No change in skin, hair or nails.  NEUROLOGIC:  No Headaches  PSYCHIATRIC:  No disorder of thought or mood.  ENDOCRINE:  No heat or cold intolerance  HEMATOLOGICAL:  No easy bruising or bleeding.       Physical Exam:  GEN: NAD  HEENT: normocephalic and atraumatic. EOMI. PERRL.    NECK: Supple.   LUNGS: CTA B/L.  HEART: RRR  ABDOMEN: Soft, NT, ND.  +BS.    : No CVA tenderness  EXTREMITIES: Without  edema.  MSK: No joint swelling  NEUROLOGIC: No Focal Deficits   PSYCHIATRIC: Appropriate affect .  SKIN: No rash      Vitals:  T(F): 97.1 (2023 14:21), Max: 98.2 (2023 01:00)  HR: 69 (2023 14:21)  BP: 139/73 (2023 14:21)  RR: 17 (2023 14:21)  SpO2: 93% (2023 14:) (93% - 97%)  temp max in last 48H T(F): , Max: 98.2 (23 @ 01:00)      Current Antibiotics:    Other medications:  amLODIPine   Tablet 10 milliGRAM(s) Oral daily  aspirin  chewable 81 milliGRAM(s) Oral daily  Axitinib 5 mG/Dose 1 Tablet(s) Oral two times a day  clopidogrel Tablet 75 milliGRAM(s) Oral daily  influenza  Vaccine (HIGH DOSE) 0.7 milliLiter(s) IntraMuscular once  isosorbide   mononitrate ER Tablet (IMDUR) 30 milliGRAM(s) Oral daily  lisinopril 20 milliGRAM(s) Oral daily  metoprolol succinate  milliGRAM(s) Oral daily  mirtazapine 15 milliGRAM(s) Oral at bedtime  sodium chloride 0.9%. 1000 milliLiter(s) IV Continuous <Continuous>                 8.7    13.21 )-----------( 393      ( 2023 06:05 )             27.9         138  |  110<H>  |  15  ----------------------------<  77  3.5   |  22  |  0.63    Ca    8.4<L>      2023 06:05  Mg     2.6         TPro  7.0  /  Alb  1.0<L>  /  TBili  0.4  /  DBili  x   /  AST  31  /  ALT  32  /  AlkPhos  185<H>      RECENT CULTURES:   @ 20:53    RVP  NotDetec      WBC Count: 13.21 K/uL (23 @ 06:05)  WBC Count: 14.32 K/uL (23 @ 20:53)    Creatinine, Serum: 0.63 mg/dL (23 @ 06:05)  Creatinine, Serum: 0.99 mg/dL (23 @ 20:53)    SARS-CoV-2: NotDetec (23 @ 20:53)    Urinalysis Basic - ( 2023 01:59 )    Color: Yellow / Appearance: Clear / S.015 / pH: x  Gluc: x / Ketone: Negative  / Bili: Negative / Urobili: Negative   Blood: x / Protein: 30 mg/dL / Nitrite: Negative   Leuk Esterase: Trace / RBC: 3-5 /HPF / WBC 3-5   Sq Epi: x / Non Sq Epi: Occasional / Bacteria: x      < from: Xray Chest 1 View- PORTABLE-Urgent (Xray Chest 1 View- PORTABLE-Urgent .) (23 @ 21:45) >  ACC: [id number] EXAM:  XR CHEST PORTABLE URGENT 1V   ORDERED BY: REED ALVARES     PROCEDURE DATE:  2023      INTERPRETATION:  Portable chest radiograph    CLINICAL INFORMATION: 3/2/2023 chest x-ray    TECHNIQUE:  Portable  AP chest radiograph.    COMPARISON: None. .    FINDINGS:  CATHETERS AND TUBES: None    PULMONARY: The visualized lungs are clear of airspace consolidations or   pleural effusions.   No pneumothorax.    HEART/VASCULAR: The heart and mediastinum size and configuration are   within normal limits. Status post median sternotomy.    BONES: Visualized osseous thorax intact.    IMPRESSION:   No radiographic evidence of active chest disease.    --- End of Report ---    < end of copied text >      < from: US Duplex Venous Lower Ext Complete, Bilateral (23 @ 14:07) >  ACC: [id number] EXAM:  US DPLX LWR EXT VEINS COMPL BI   ORDERED BY: RUPAL PHILIPPE     PROCEDURE DATE:  2023          INTERPRETATION:  CLINICAL INFORMATION: Lower extremity edema.    COMPARISON: None available.    TECHNIQUE: Duplex sonography ofthe BILATERAL LOWER extremity veins with   color and spectral Doppler, with and without compression.    FINDINGS:    RIGHT:  Normal compressibility of the RIGHT common femoral, femoral and popliteal   veins.  Doppler examination shows normal spontaneous and phasic flow.  No RIGHT calf vein thrombosis is detected.    LEFT:  Normal compressibility of the LEFT common femoral, femoral and popliteal   veins.  Doppler examination shows normal spontaneous and phasic flow.  No LEFT calf vein thrombosis is detected.    IMPRESSION:  No evidence of deep venous thrombosis in either lower extremity.      < end of copied text >              
Patient is a 77y old  Male who presents with a chief complaint of weakness (02 Apr 2023 20:06)      HPI:  77-year-old male with past medical history of CAD s/p PCI with stent and CABG, hypertension, hyperlipidemia, newly diagnosed Left renal carcinoma with mets to the lung s/p left lower lung biopsy 1/2023, currently on axitinib 5 mg orally twice daily with chemoinfusion, last chemo was 2 weeks ago, presents to the emergency department by ambulance with report of fall. Patients states that since started chemo he has been feeling more week with difficulty ambulating, difficulty even holding objects at times and easily losing his balance. He also reports decreased appetite for the past few months along with diarrhea for the past 2 weeks. He stood up to go the bathroom today and felt weak/off balance, fell to the ground and had a BM in his pants. He was helped up by son in law and the ambulance was called. He states he hit head but denies hurting other parts of his body and denies any current pain at this time. He denies any dizziness, fainting, fever, chills, HA, cp, sob, N/V/C, blood urine or stool.   Patient was recently admitted 1/2023 for ACS event. At that time imaging studies revealed L renal lesion [id number] cm concerning for malignancy, LLL nodule concern for mets, and L4 lucent lesion c/f bone mets 1.5 x 1.4 cm. He has since started chemotherapy and takes axitinib 5 mg twice daily which he attributes to his new weakness.   Patient states he does not have a HCP and has not made decisions regarding code status.     ED course:   Vitals: T 97.8 HR 76 /78   Labs: Wbc 14.32 H/H 8.4/28.3 PT/INR 16.8/1.63 Na 134 AST//65  Lactate 2.5--> 2.1 pro BNP 2256  EKG: NSR HR 72   Given IL Bolus  (31 Mar 2023 22:44)    INTERVAL HISTORY: Patient seen and examined at bedside. Patient's daughter Anitha at bedside. Patient denies CP, palpitations, SOB. Daughter reports patient has been having LE swelling as of recently, prior to admission. Patient admits to intermittent LLQ pain, "comes and goes, lasts a few seconds".     Cardiology outpatient: Dr. Schultz, last visit 2/27/23    PAST MEDICAL & SURGICAL HISTORY:  HTN - Hypertension      H/O hyperlipidemia      CAD, multiple vessel      Renal colic      Hypertension      S/P CABG x 3  1995      S/P coronary artery stent placement  2 stents - 3 years ago      S/P knee surgery  arthroscopic b/l      S/P hernia repair      S/P CABG x 4      S/P coronary artery stent placement                ECHO  FINDINGS:  < from: TTE Echo Complete w/o Contrast w/ Doppler (01.05.23 @ 07:52) >  PROCEDURE DATE:  01/05/2023          INTERPRETATION:  INDICATION: Chest pain  Sonographer PH    Blood Pressure 136/72    Height 170 cm     Weight 75 kg    Dimensions:  LA 3.8      Normal Values: 2.0 - 4.0 cm  Ao 3.2        Normal Values: 2.0 - 3.8 cm  SEPTUM 0.9       Normal Values: 0.6 - 1.2 cm  PWT 0.8       Normal Values: 0.6 - 1.1 cm  LVIDd 4.0         Normal Values: 3.0 - 5.6 cm  LVIDs 2.7         Normal Values: 1.8- 4.0 cm      OBSERVATIONS:  Mitral Valve: Mitral annular calcification with thickened leaflets. Mild   MR.  Aortic Valve/Aorta: Calcified trileaflet aortic valve with grossly normal   opening.  Tricuspid Valve: normal with trace TR.  Pulmonic Valve:Not well-visualized  Left Atrium: normal  Right Atrium: Not well-visualized  Left Ventricle: normal LV size and systolic function, estimated LVEF of   55-60%. There is hypokinesis of the mid to basal inferior wall  Right Ventricle: Grossly normal size and systolic function.  Pericardium: no significant pericardial effusion.        IMPRESSION:  normal LV size and systolic function, estimated LVEF of 55-60%. There is   hypokinesis of the mid to basal inferior wall  Grossly normal RV size and systolic function.  Calcified trileaflet aortic valve with grossly normal opening, without AI.  Mild MR  Trace TR.  No significant pericardial effusion.    --- End of Report ---            KIMBERLY GODFREY MD; Attending Cardiologist  This document has been electronically signed. Jan 6 2023 11:06AM    < end of copied text >        MEDICATIONS  (STANDING):  amLODIPine   Tablet 10 milliGRAM(s) Oral daily  aspirin  chewable 81 milliGRAM(s) Oral daily  atorvastatin 80 milliGRAM(s) Oral at bedtime  clopidogrel Tablet 75 milliGRAM(s) Oral daily  influenza  Vaccine (HIGH DOSE) 0.7 milliLiter(s) IntraMuscular once  isosorbide   mononitrate ER Tablet (IMDUR) 30 milliGRAM(s) Oral daily  lisinopril 20 milliGRAM(s) Oral daily  metoprolol succinate  milliGRAM(s) Oral daily  mirtazapine 15 milliGRAM(s) Oral at bedtime  sodium chloride 0.9%. 1000 milliLiter(s) (70 mL/Hr) IV Continuous <Continuous>    MEDICATIONS  (PRN):  acetaminophen     Tablet .. 650 milliGRAM(s) Oral every 6 hours PRN Temp greater or equal to 38C (100.4F), Mild Pain (1 - 3)  aluminum hydroxide/magnesium hydroxide/simethicone Suspension 30 milliLiter(s) Oral every 4 hours PRN Dyspepsia  melatonin 3 milliGRAM(s) Oral at bedtime PRN Insomnia  ondansetron Injectable 4 milliGRAM(s) IV Push every 8 hours PRN Nausea and/or Vomiting      FAMILY HISTORY:  FH: cardiovascular disease (Father)      Denies Family history of CAD or early MI      Constitutional: denies fever, chills  HEENT: denies blurry vision, difficulty hearing  Respiratory: denies SOB, GUSTAFSON, cough  Cardiovascular: denies CP, palpitations, orthopnea, PND. +LE edema  Gastrointestinal: denies nausea, vomiting, abdominal pain  Genitourinary: denies urinary changes  Skin: Denies rashes, itching  Neurologic: denies headache, weakness, dizziness  Hematology/Oncology: denies bleeding, easy bruising  ROS negative except as noted above      SOCIAL HISTORY:    No tobacco, Alcohol or Drug use    Vital Signs Last 24 Hrs  T(C): 36.6 (03 Apr 2023 12:49), Max: 36.9 (03 Apr 2023 05:20)  T(F): 97.8 (03 Apr 2023 12:49), Max: 98.5 (03 Apr 2023 05:20)  HR: 75 (03 Apr 2023 12:49) (75 - 94)  BP: 143/72 (03 Apr 2023 12:49) (143/72 - 149/77)  BP(mean): --  RR: 17 (03 Apr 2023 12:49) (17 - 19)  SpO2: 93% (03 Apr 2023 12:49) (91% - 93%)    Parameters below as of 03 Apr 2023 12:49  Patient On (Oxygen Delivery Method): room air        Physical Exam:  General: NAD  HEENT: NCAT, dry mucous membranes   Neck: Supple  Neurology: A&Ox3, nonfocal, sensation intact   Respiratory: CTA B/L, No W/R/R  CV: RRR, +S1/S2  Abdominal: Soft, NT, ND +BSx4, no palpable masses  Extremities: No C/C. +trace b/l LE edema. + peripheral pulses  Skin: warm, dry, normal color      ECG: NSR, rate 71     I&O's Detail      LABS:                        9.2    12.58 )-----------( 418      ( 03 Apr 2023 06:58 )             31.3     04-03    139  |  110<H>  |  16  ----------------------------<  90  3.7   |  24  |  0.85    Ca    8.5      03 Apr 2023 06:58    TPro  7.3  /  Alb  1.1<L>  /  TBili  0.4  /  DBili  x   /  AST  35  /  ALT  30  /  AlkPhos  204<H>  04-03            I&O's Summary    BNP  RADIOLOGY & ADDITIONAL STUDIES:
Patient is a 77y old  Male who presents with a chief complaint of weakness (01 Apr 2023 17:22)      HPI:  77-year-old male with past medical history of CAD s/p PCI with stent and CABG, hypertension, hyperlipidemia, newly diagnosed Left renal carcinoma with mets to the lung s/p left lower lung biopsy 1/2023, currently on axitinib 5 mg orally twice daily with chemoinfusion, last chemo was 2 weeks ago, presents to the emergency department by ambulance with report of fall. Patients states that since started chemo he has been feeling more week with difficulty ambulating, difficulty even holding objects at times and easily losing his balance. He also reports decreased appetite for the past few months along with diarrhea for the past 2 weeks. He stood up to go the bathroom today and felt weak/off balance, fell to the ground and had a BM in his pants. He was helped up by son in law and the ambulance was called. He states he hit head but denies hurting other parts of his body and denies any current pain at this time. He denies any dizziness, fainting, fever, chills, HA, cp, sob, N/V/C, blood urine or stool.   Patient was recently admitted 1/2023 for ACS event. At that time imaging studies revealed L renal lesion [id number] cm concerning for malignancy, LLL nodule concern for mets, and L4 lucent lesion c/f bone mets 1.5 x 1.4 cm. He has since started chemotherapy and takes axitinib 5 mg twice daily which he attributes to his new weakness.   Patient states he does not have a HCP and has not made decisions regarding code status.     ED course:   Vitals: T 97.8 HR 76 /78   Labs: Wbc 14.32 H/H 8.4/28.3 PT/INR 16.8/1.63 Na 134 AST//65  Lactate 2.5--> 2.1 pro BNP 2256  EKG: NSR HR 72   Given IL Bolus  (31 Mar 2023 22:44)       ROS:  Negative except for:    PAST MEDICAL & SURGICAL HISTORY:  HTN - Hypertension      H/O hyperlipidemia      CAD, multiple vessel      Renal colic      Hypertension      S/P CABG x 3  1995      S/P coronary artery stent placement  2 stents - 3 years ago      S/P knee surgery  arthroscopic b/l      S/P hernia repair      S/P CABG x 4      S/P coronary artery stent placement          SOCIAL HISTORY:    FAMILY HISTORY:  FH: cardiovascular disease (Father)        MEDICATIONS  (STANDING):  amLODIPine   Tablet 10 milliGRAM(s) Oral daily  aspirin  chewable 81 milliGRAM(s) Oral daily  atorvastatin 80 milliGRAM(s) Oral at bedtime  Axitinib 5 mG/Dose 1 Tablet(s) Oral two times a day  clopidogrel Tablet 75 milliGRAM(s) Oral daily  influenza  Vaccine (HIGH DOSE) 0.7 milliLiter(s) IntraMuscular once  isosorbide   mononitrate ER Tablet (IMDUR) 30 milliGRAM(s) Oral daily  lisinopril 20 milliGRAM(s) Oral daily  metoprolol succinate  milliGRAM(s) Oral daily  mirtazapine 15 milliGRAM(s) Oral at bedtime  sodium chloride 0.9%. 1000 milliLiter(s) (70 mL/Hr) IV Continuous <Continuous>    MEDICATIONS  (PRN):  acetaminophen     Tablet .. 650 milliGRAM(s) Oral every 6 hours PRN Temp greater or equal to 38C (100.4F), Mild Pain (1 - 3)  aluminum hydroxide/magnesium hydroxide/simethicone Suspension 30 milliLiter(s) Oral every 4 hours PRN Dyspepsia  melatonin 3 milliGRAM(s) Oral at bedtime PRN Insomnia  ondansetron Injectable 4 milliGRAM(s) IV Push every 8 hours PRN Nausea and/or Vomiting      Allergies    No Known Allergies    Intolerances        Vital Signs Last 24 Hrs  T(C): 36.2 (01 Apr 2023 14:21), Max: 36.8 (01 Apr 2023 01:00)  T(F): 97.1 (01 Apr 2023 14:21), Max: 98.2 (01 Apr 2023 01:00)  HR: 69 (01 Apr 2023 14:21) (66 - 78)  BP: 139/73 (01 Apr 2023 14:21) (138/78 - 155/82)  BP(mean): --  RR: 17 (01 Apr 2023 14:21) (17 - 20)  SpO2: 93% (01 Apr 2023 14:21) (93% - 97%)    Parameters below as of 01 Apr 2023 14:21  Patient On (Oxygen Delivery Method): room air        PHYSICAL EXAM  General: adult in NAD  HEENT: clear oropharynx, anicteric sclera, pink conjunctivae  Neck: supple  CV: normal S1S2 with no murmur rubs or gallops  Lungs: clear to auscultation, no wheezes, no rhales  Abdomen: soft non-tender non-distended, no hepato/splenomegaly  Ext: no clubbing cyanosis or edema  Skin: no rashes and no petichiae  Neuro: alert and oriented X3 no focal deficits      LABS:    CBC Full  -  ( 01 Apr 2023 06:05 )  WBC Count : 13.21 K/uL  RBC Count : 3.60 M/uL  Hemoglobin : 8.7 g/dL  Hematocrit : 27.9 %  Platelet Count - Automated : 393 K/uL  Mean Cell Volume : 77.5 fl  Mean Cell Hemoglobin : 24.2 pg  Mean Cell Hemoglobin Concentration : 31.2 gm/dL  Auto Neutrophil # : x  Auto Lymphocyte # : x  Auto Monocyte # : x  Auto Eosinophil # : x  Auto Basophil # : x  Auto Neutrophil % : x  Auto Lymphocyte % : x  Auto Monocyte % : x  Auto Eosinophil % : x  Auto Basophil % : x    04-01    138  |  110<H>  |  15  ----------------------------<  77  3.5   |  22  |  0.63    Ca    8.4<L>      01 Apr 2023 06:05  Mg     2.6     03-31    TPro  7.0  /  Alb  1.0<L>  /  TBili  0.4  /  DBili  x   /  AST  31  /  ALT  32  /  AlkPhos  185<H>  04-01    PT/INR - ( 31 Mar 2023 20:53 )   PT: 16.8 sec;   INR: 1.43 ratio         PTT - ( 31 Mar 2023 20:53 )  PTT:37.1 sec  Iron - Total Binding Capacity.: 148 ug/dL (03-31 @ 22:30)  Ferritin, Serum: 1938 ng/mL (03-31 @ 22:30)          BLOOD SMEAR INTERPRETATION:    RADIOLOGY & ADDITIONAL STUDIES:    < from: CT Head No Cont (03.31.23 @ 23:29) >  ACC: [id number] EXAM:  CT BRAIN   ORDERED BY: ROXANA BAEZ     PROCEDURE DATE:  03/31/2023          INTERPRETATION:  CLINICAL INFORMATION: Status post fall with head strike.    Metastatic renal cancer.  TECHNIQUE:  Axial CT images were acquiredthrough the head.  Intravenous contrast: None  Two-dimensional reformats were generated.    COMPARISON STUDY: CT head without and with intravenous contrast from   01/04/2023.    FINDINGS:  There is no CT evidence of acute intracranial hemorrhage, so subdural   collection, mass effect, midline shift or acute territorial infarct.    There is slight prominence of the ventricles, compatible with mild   age-related involutional changes.  No clinically significant chronic   microvascular ischemic disease or other white matter pathology is   visualized by CT technique.    The visualized paranasal sinuses and the mastoids are grossly clear.    The calvarium, skull base, and orbits appear within normal limits.    IMPRESSION:  No CT evidence of intracranial pathology.    --- End of Report ---            KHANG SOUZA MD; Attending Radiologist  This document has been electronically signed. Apr 1 2023 12:02AM    < end of copied text >

## 2023-04-03 NOTE — CONSULT NOTE ADULT - CONSULT REASON
CAD, HTN
evaluation of malignant neoplasm of left renal pelvis C65.2  secondary malignant neoplasm of lung C78.01
Sepsis

## 2023-04-03 NOTE — PROGRESS NOTE ADULT - PROBLEM SELECTOR PLAN 3
Plan: Hb 8.4, previous baseline used to 14 before cancer diagnosis   - maybe related to renal mass, possible anemia of chronic disease related to an underlying renal malignancy  - iron studies ordered  - being transfused 1U PRBC- Hgb 8.7 this AM, did not rise appropriately, could be due to malignancy, check FOBT  - Heme Onc Dr. Jay consulted fu recs
Plan: Hb 8.4, previous baseline used to 14 before cancer diagnosis   - maybe related to renal mass, possible anemia of chronic disease related to an underlying renal malignancy  - iron studies ordered  - s/p 1U PRBC- Hgb 9.2 this AM, check FOBT  - Heme Onc Dr. Jay consulted fu recs- recommending 1 unit PRBC as patient still fatigued/symptomatic from anemia
Plan: Hb 8.4, previous baseline used to 14 before cancer diagnosis   - maybe related to renal mass, possible anemia of chronic disease related to an underlying renal malignancy  - iron studies ordered  - s/p 1U PRBC- Hgb 8.4 this AM, could be due to malignancy, check FOBT  - Heme Onc Dr. Jay consulted fu recs

## 2023-04-03 NOTE — PROGRESS NOTE ADULT - PROBLEM SELECTOR PROBLEM 2
[facility] PHYS[facility]ANS  SPECIALIZING IN BREAKTHROUGHS  Radiation Oncology    On Treatment Visit Note      Francesca Rowland      Date: 2020   MRN: [id number]   : 1977  Diagnosis: Cervical cancer      Reason for Visit:  On Radiation Treatment Visit     Treatment Summary to Date  Treatment Site: pelvis Current Dose: 3880/4500 cGy Fractions:       Chemotherapy  Chemo concurrent with radx?: Yes  Oncologist: Rola  Drug Name/Frequency 1: Cisplatin    ED Visit/Hosiptal Admission: None     Treatment Breaks: None      Subjective:   Francesca continues to have diarrhea.  She usually takes 2 Imodium a day.  Today, she had 3 episodes of diarrhea.  She also complains of sensitivity in her labia and mild sting with urination.  UA done today does not suggest UTI.  She is now feeling more fatigued, working only 2 days a week instead of full time.      Nursing ROS:   Nutrition Alteration  Diet Type: Patient's Preference  Skin  Skin Reaction: 0 - No changes        Cardiovascular  Respiratory effort: 1 - Normal - without distress  Gastrointestinal  Nausea: 0 - None  Diarrhea: 2 - Three to five soft or liquid bowel movements per day(using imodium 2-3 tbas/day.)  GI Note: WNL  Genitourinary   Note: WNL, no vaginal bleeding  Psychosocial  Pyschosocial Note: No complaints per pt  Pain Assessment  0-10 Pain Scale: 0      Objective:   /80   Pulse 84   Gen: Fatigued, but no acute distress  Skin: Will check on treatment table tomorrow     Labs:  CBC RESULTS:   Recent Labs   Lab Test 20  0631   WBC 3.6*   RBC 4.40   HGB 13.0   HCT 37.0   MCV 84   MCH 29.5   MCHC 35.1   RDW 12.3   *     ELECTROLYTES:  Recent Labs   Lab Test 20  0631      POTASSIUM 3.3*   CHLORIDE 105   SHAD 8.6   CO2 27   BUN 10   CR 0.76   *       Assessment:    Tolerating radiation therapy well.  All questions and concerns addressed.    Toxicities:  Fatigue: Grade 1: Fatigue relieved by rest  Diarrhea: 
Transaminitis
Grade 1: Increase of <4 stools per day over baseline; mild increase in ostomy output compared to baseline  Urinary frequency: Grade 0: No toxicity  Urinary tract pain: Grade 1: Mild pain  Urinary urgency: Grade 0: No toxicity    Plan:   1. Continue current therapy.    2. Dysuria and labial tenderness: Camelia-bottle provided.  3. Diarrhea: continue wit Imodium as needed.        Mosaiq chart and setup information reviewed  MVCT/IGRT images checked    Medication Review  Med Note: No changes per pt    Educational Topic Discussed  Education Instructions: reviewed        Bret Kerns MD/PhD  [phone] clinic  Pager [phone]    Please do not send letter to referring physician.    
Transaminitis
Leukocytosis

## 2023-04-03 NOTE — PROGRESS NOTE ADULT - PROBLEM SELECTOR PLAN 4
Improved  GI  saw on previous admission  continue home statin
Improved  GI  saw on previous admission--> noted elevated lfts; liver normal on CT   restart home statin
Improved  GI  saw on previous admission  restart home statin

## 2023-04-03 NOTE — PROGRESS NOTE ADULT - NSPROGADDITIONALINFOA_GEN_ALL_CORE
plan of care discussed with daughter at bedside and partner, Doreen Roberts, over phone
plan of care discussed with daughter at bedside
plan of care discussed with daughter at bedside.

## 2023-04-03 NOTE — PROGRESS NOTE ADULT - ASSESSMENT
Metastatic renal cell carcinoma on axinitib and keytruda at [facility].  Has received 1 cycle of treatment.      Now admitted after a fall.  complains of weakness and difficulty walking.    ?balance/ataxia related to treatement.  Head CT without obvious evidence of metastatic disease    no nausea      RECOMMENDATIONS    1.  follow CBC. Transfuse additional 1U PRBC due to sx.   Recent MI  hx carotid diease    2.  hold treatment and assess response to symptoms. if no improvement may consider steroids if feel related to immunotherapy  3.  continue medical evaluation  4.  d/c axitinib, for now. Resume outpt.     Thank you for consulting us.   No additional recommendations at current time.   Will sign off on case for now.   Please call, or re-consult if needed.        Metastatic renal cell carcinoma on axinitib and keytruda at [facility].  Has received 1 cycle of treatment.      Now admitted after a fall.  complains of weakness and difficulty walking.    ?balance/ataxia related to treatement.  Head CT without obvious evidence of metastatic disease    no nausea      RECOMMENDATIONS    1.  follow CBC. Transfuse additional 1U PRBC due to sx.   Recent MI  hx carotid diease    2.  hold treatment and assess response to symptoms.   if no improvement may consider steroids if feel related to immunotherapy    3.  continue medical evaluation  4.  d/c axitinib, for now. Resume outpt.   Consideration to resume at lower dose if pt feel ready.     d/w Dr Pretty ALVAREZ plan for rehab    Thank you for consulting us.   No additional recommendations at current time.   Will sign off on case for now.   Please call, or re-consult if needed.

## 2023-04-03 NOTE — CARE COORDINATION ASSESSMENT. - NSCAREPROVIDERS_GEN_ALL_CORE_FT
CARE PROVIDERS:  Accepting Physician: Daniel Young  Administration: Aden Beach  Administration: Vaibhav Castellanos  Administration: Sarah Márquez  Admitting: Daniel Young  Attending: Daniel Young  Case Management: Rosetta Rubio  Consultant: Tiffany Tidwell  Consultant: Reinier Celis  Consultant: Thang Jay  Consultant: Abiola Covarrubias  Consultant: Neha Foley  Covering Team: Daniel Young  ED Attending: Rusty Rubi  ED Nurse: Sol Armstrong  Nurse: Zhanna Troy  Nurse: Ella Rowe  Oncology: Mir,   Ordered: Physician, Ordering  Outpatient Provider: Kiko Schultz  Override: Shona Rosenbaum  Override: Saeid Shanks  PCA/Nursing Assistant: Rylie West  Physical Therapy: Idalia Montemayor  Physical Therapy: Mag Tan  Primary Team: Aman Olsen  Registered Dietitian: Josie Louise  : Siddharth Monge// Supp. Assoc.: Norris Lieberman

## 2023-04-03 NOTE — CONSULT NOTE ADULT - ASSESSMENT
77-year-old male with past medical history of CAD, cardiac stent, CABG x3 vessels, hypertension, hyperlipidemia, newly diagnosed Left renal carcinoma with mets to the lung s/p left lower lung biopsy 1/2023, currently on axitinib 5 mg orally twice daily with chemoinfusion, last chemo was 2 weeks ago, presents to the emergency department by ambulance with report of fall. Admit for weakness, acute on chronic anemia and leukocytosis.    - Patient is not complaining of any cardiac symptoms at this time.  - No clear evidence of acute ischemia, trops negative x 2. Will follow up third set.  - His CKs are flat, suggesting against acute atherosclerotic plaque rupture.  - Biomarker trend is not consistent with plaque rupture but rather demand ischemia. Monitor closely for the development of anginal symptoms or clinical signs of ischemia.     - EKG: NSR, rate 71  - No acute changes on EKG compared to previous.  - EKG outpatient 2/2023 sinus rhythm, rate 84  - History of prior NSTEMI, admitted 1/2023     - Trace bilateral lower extremity edema on exam, in setting of hypoalbuminemia 1.0  - Previous TTE 1/5/2023 shows LVEF 55-60%, hypokinesis of mid to basal LV inferior wall, mild MR, trace TR      #History of HTN, #fall likely vasovagal, orthostatic hypotension  - /81  - Monitor routine hemodynamics.  - Check orthostatic blood pressure measurements    - Monitor and replete lytes, keep K>4, Mg>2.    - Other cardiovascular workup will depend on clinical course.  - All other workup per primary team.  - Will continue to follow.   77-year-old male with past medical history of CAD, cardiac stent, CABG x3 vessels, hypertension, hyperlipidemia, newly diagnosed Left renal carcinoma with mets to the lung s/p left lower lung biopsy 1/2023, currently on axitinib 5 mg orally twice daily with chemoinfusion, last chemo was 2 weeks ago, presents to the emergency department by ambulance with report of fall. Admit for weakness, acute on chronic anemia and leukocytosis.    - fall likely 2/2 orthostasis and increased vagal tone  - check orthostatics  - remote tele  - cont norvasc, nitrate, lisinopril, bb for now    - has sig underlying CAD.  History of prior NSTEMI in 1/2023 which was medically managed.   - No clear evidence of acute ischemia, trops negative x 2.    - EKG: NSR, rate 71  - cont dapt for now.   - cont statin     - Appears compensated from HF POV.   - bilateral lower extremity edema on exam, in setting of hypoalbuminemia 1.0  - Previous TTE 1/5/2023 shows LVEF 55-60%, hypokinesis of mid to basal LV inferior wall, mild MR, trace TR   - no need to repeat     - fu heme/onc    - Monitor and replete lytes, keep K>4, Mg>2.  - Other cardiovascular workup will depend on clinical course.  - All other workup per primary team.  - Will continue to follow.

## 2023-04-03 NOTE — PROGRESS NOTE ADULT - PROBLEM SELECTOR PLAN 5
Left RCC with mets to lungs   - On axitnib 5 mg BID with chemoinfusion next appt April 11th, last appt 2 weeks ago   - c/w axitinib 5 mg BID  - cancer and chemo likely causing new weakness  - Heme Onc Dr. Jay consulted fu recs- f/u recent PET scan results at [facility]
Left RCC with mets to lungs   - On axitnib 5 mg BID with chemoinfusion next appt April 11th, last appt 2 weeks ago   - c/w axitinib 5 mg BID  - cancer and chemo likely causing new weakness  - Heme Onc Dr. Jay consulted fu recs
Left RCC with mets to lungs   - On axitnib 5 mg BID with chemoinfusion next appt April 11th, last appt 2 weeks ago   - c/w axitinib 5 mg BID- on hold per Heme  - cancer and chemo likely causing new weakness  - Heme Onc Dr. Jay consulted fu recs- f/u recent PET scan results at [facility]- discussed with Dr. Pierson, discussed patient's current condition and recommendation for JABIER- states if patient is weak and needs JABIER, would have to hold chemo infusion, but would prefer to restart oral chemo at reduced dose in AM if patient feeling stronger.

## 2023-04-03 NOTE — PROGRESS NOTE ADULT - NUTRITIONAL ASSESSMENT
This patient has been assessed with a concern for Malnutrition and has been determined to have a diagnosis/diagnoses of Severe protein-calorie malnutrition.    This patient is being managed with:   Diet DASH/TLC-  Sodium & Cholesterol Restricted  Entered: Mar 31 2023 10:42PM  

## 2023-04-03 NOTE — CARE COORDINATION ASSESSMENT. - OTHER PERTINENT REFERRAL INFORMATION
SWI meet with pt at bedside.  SWI introduce self and educated about roles & responsibilities, pt verbalize an understanding.  PTA pt reports he resides at home with not stairs with his wife.  Pt reports his ADL is needs some assistances, which he receives from his wife.  Pt reports he ambulates without any medical assistances.  Pt reports he has never attended rehab, but if PT recommends JABIER he would be interested in going to  [facility].  SWI informed if PT recommends JABIER, more choices would be provided, pt acknowledge.     SWI will remain available for any discharge needs.

## 2023-04-03 NOTE — PATIENT CHOICE NOTE. - NSPTCHOICENOTES_GEN_ALL_CORE
Pt reports if PT recommends JABIER, he would be interested in going to [facility].  Pt is aware additional choices is needed.  Pt reports if PT recommends JABIER, he would be interested in going to [facility].  Pt is aware additional choices is needed.

## 2023-04-03 NOTE — PROGRESS NOTE ADULT - PROBLEM SELECTOR PLAN 8
transaminitis improved, can restart statin
transaminitis improved, can restart statin
transaminitis improved, can continue statin

## 2023-04-03 NOTE — CONSULT NOTE ADULT - ATTENDING COMMENTS
fall likely 2.2 .orthostasis.   No signs of significant ischemia or volume overload.   cont meds if not orthostatics  edema likely from hypoalbuminemia.   remote tele  Further cardiac workup will depend on clinical course.

## 2023-04-03 NOTE — CARE COORDINATION ASSESSMENT. - NSPASTMEDSURGHISTORY_GEN_ALL_CORE_FT
PAST MEDICAL & SURGICAL HISTORY:  Renal colic      CAD, multiple vessel      H/O hyperlipidemia      HTN - Hypertension      S/P hernia repair      S/P knee surgery  arthroscopic b/l      S/P coronary artery stent placement  2 stents - 3 years ago      S/P CABG x 3  1995      Hypertension      S/P coronary artery stent placement      S/P CABG x 4

## 2023-04-03 NOTE — PROGRESS NOTE ADULT - PROBLEM SELECTOR PLAN 1
Patient presents s/p fall 2/2 to weakness denies syncope  - CT head ordered- no intracranial pathology noted  - Weakness likely secondary to started chemotherapy treatment and/or infectious etiology   - Also complains of diarrhea had BM prior to fall; f/u GI stool PCR   - Patient with leukocytosis (wbc 14.32)  and elevated lactate on admission may be 2/2 to cancer ; unclear source of infection  - Does not meet sepsis criteria on admission   - Given IV NS 1 L bolus x1 in ED  - Trend WBC and monitor for fever  - F/u UA, UCx, BCx x2  - ID Dr. Almanzar consulted   - PT consulted- initially recommending home PT, however, patient appears weaker, on reassessment by PT- recommending JABIER  -Complaining of decreased appetite, will trial mirtazapine 15mg QHS

## 2023-04-03 NOTE — PROGRESS NOTE ADULT - PROBLEM SELECTOR PLAN 7
- C/w Norvasc, Metoprolol, Lisinopril  - Monitor hemodynamics

## 2023-04-04 ENCOUNTER — TRANSCRIPTION ENCOUNTER (OUTPATIENT)
Age: 78
End: 2023-04-04

## 2023-04-04 VITALS
DIASTOLIC BLOOD PRESSURE: 71 MMHG | SYSTOLIC BLOOD PRESSURE: 137 MMHG | TEMPERATURE: 97 F | HEART RATE: 77 BPM | OXYGEN SATURATION: 92 % | RESPIRATION RATE: 18 BRPM

## 2023-04-04 LAB
ALBUMIN SERPL ELPH-MCNC: 1 G/DL — LOW (ref 3.3–5)
ALP SERPL-CCNC: 198 U/L — HIGH (ref 40–120)
ALT FLD-CCNC: 29 U/L — SIGNIFICANT CHANGE UP (ref 12–78)
ANION GAP SERPL CALC-SCNC: 7 MMOL/L — SIGNIFICANT CHANGE UP (ref 5–17)
AST SERPL-CCNC: 38 U/L — HIGH (ref 15–37)
BILIRUB SERPL-MCNC: 0.6 MG/DL — SIGNIFICANT CHANGE UP (ref 0.2–1.2)
BUN SERPL-MCNC: 15 MG/DL — SIGNIFICANT CHANGE UP (ref 7–23)
CALCIUM SERPL-MCNC: 8.4 MG/DL — LOW (ref 8.5–10.1)
CHLORIDE SERPL-SCNC: 111 MMOL/L — HIGH (ref 96–108)
CO2 SERPL-SCNC: 22 MMOL/L — SIGNIFICANT CHANGE UP (ref 22–31)
CREAT SERPL-MCNC: 0.71 MG/DL — SIGNIFICANT CHANGE UP (ref 0.5–1.3)
EGFR: 95 ML/MIN/1.73M2 — SIGNIFICANT CHANGE UP
GLUCOSE SERPL-MCNC: 81 MG/DL — SIGNIFICANT CHANGE UP (ref 70–99)
HCT VFR BLD CALC: 35.3 % — LOW (ref 39–50)
HGB BLD-MCNC: 10.6 G/DL — LOW (ref 13–17)
MCHC RBC-ENTMCNC: 24.3 PG — LOW (ref 27–34)
MCHC RBC-ENTMCNC: 30 GM/DL — LOW (ref 32–36)
MCV RBC AUTO: 80.8 FL — SIGNIFICANT CHANGE UP (ref 80–100)
NRBC # BLD: 0 /100 WBCS — SIGNIFICANT CHANGE UP (ref 0–0)
PLATELET # BLD AUTO: 410 K/UL — HIGH (ref 150–400)
POTASSIUM SERPL-MCNC: 3.7 MMOL/L — SIGNIFICANT CHANGE UP (ref 3.5–5.3)
POTASSIUM SERPL-SCNC: 3.7 MMOL/L — SIGNIFICANT CHANGE UP (ref 3.5–5.3)
PROT SERPL-MCNC: 7.3 G/DL — SIGNIFICANT CHANGE UP (ref 6–8.3)
RBC # BLD: 4.37 M/UL — SIGNIFICANT CHANGE UP (ref 4.2–5.8)
RBC # FLD: 21.9 % — HIGH (ref 10.3–14.5)
SARS-COV-2 RNA SPEC QL NAA+PROBE: SIGNIFICANT CHANGE UP
SODIUM SERPL-SCNC: 140 MMOL/L — SIGNIFICANT CHANGE UP (ref 135–145)
WBC # BLD: 15.27 K/UL — HIGH (ref 3.8–10.5)
WBC # FLD AUTO: 15.27 K/UL — HIGH (ref 3.8–10.5)

## 2023-04-04 PROCEDURE — 99232 SBSQ HOSP IP/OBS MODERATE 35: CPT

## 2023-04-04 PROCEDURE — 99239 HOSP IP/OBS DSCHRG MGMT >30: CPT

## 2023-04-04 RX ORDER — AXITINIB 1 MG/1
1 TABLET, FILM COATED ORAL
Qty: 0 | Refills: 0 | DISCHARGE

## 2023-04-04 RX ORDER — MIRTAZAPINE 45 MG/1
1 TABLET, ORALLY DISINTEGRATING ORAL
Qty: 0 | Refills: 0 | DISCHARGE
Start: 2023-04-04

## 2023-04-04 RX ORDER — METOPROLOL TARTRATE 50 MG
1 TABLET ORAL
Qty: 0 | Refills: 0 | DISCHARGE

## 2023-04-04 RX ORDER — METOPROLOL TARTRATE 50 MG
1 TABLET ORAL
Qty: 0 | Refills: 0 | DISCHARGE
Start: 2023-04-04

## 2023-04-04 RX ORDER — AMLODIPINE BESYLATE 2.5 MG/1
1 TABLET ORAL
Qty: 0 | Refills: 0 | DISCHARGE
Start: 2023-04-04

## 2023-04-04 RX ORDER — ACETAMINOPHEN 500 MG
2 TABLET ORAL
Qty: 0 | Refills: 0 | DISCHARGE
Start: 2023-04-04

## 2023-04-04 RX ADMIN — ISOSORBIDE MONONITRATE 30 MILLIGRAM(S): 60 TABLET, EXTENDED RELEASE ORAL at 11:37

## 2023-04-04 RX ADMIN — Medication 81 MILLIGRAM(S): at 11:37

## 2023-04-04 RX ADMIN — AMLODIPINE BESYLATE 10 MILLIGRAM(S): 2.5 TABLET ORAL at 05:39

## 2023-04-04 RX ADMIN — LISINOPRIL 20 MILLIGRAM(S): 2.5 TABLET ORAL at 05:39

## 2023-04-04 RX ADMIN — Medication 200 MILLIGRAM(S): at 05:39

## 2023-04-04 RX ADMIN — CLOPIDOGREL BISULFATE 75 MILLIGRAM(S): 75 TABLET, FILM COATED ORAL at 11:36

## 2023-04-04 NOTE — CHART NOTE - NSCHARTNOTEFT_GEN_A_CORE
Assessment: patient seen for follow up malnutrition    77y old  Male who presents with a chief complaint of weakness metastatic renal cell cancer to lungs on chemo  post PRBC  patient with varying PO this admit.         Factors impacting intake: [x ] none [ ] nausea  [ ] vomiting [ ] diarrhea [ ] constipation  [ ]chewing problems [ ] swallowing issues  [ ] other:     Diet Prescription: Diet, DASH/TLC:   Sodium & Cholesterol Restricted (03-31-23 @ 22:44)    Intake: varying this admit    Current Weight: 4/4 152.3#  no edema   % Weight Change    Pertinent Medications: MEDICATIONS  (STANDING):  amLODIPine   Tablet 10 milliGRAM(s) Oral daily  aspirin  chewable 81 milliGRAM(s) Oral daily  atorvastatin 80 milliGRAM(s) Oral at bedtime  clopidogrel Tablet 75 milliGRAM(s) Oral daily  influenza  Vaccine (HIGH DOSE) 0.7 milliLiter(s) IntraMuscular once  isosorbide   mononitrate ER Tablet (IMDUR) 30 milliGRAM(s) Oral daily  lisinopril 20 milliGRAM(s) Oral daily  metoprolol succinate  milliGRAM(s) Oral daily  mirtazapine 15 milliGRAM(s) Oral at bedtime  sodium chloride 0.9%. 1000 milliLiter(s) (70 mL/Hr) IV Continuous <Continuous>    MEDICATIONS  (PRN):  acetaminophen     Tablet .. 650 milliGRAM(s) Oral every 6 hours PRN Temp greater or equal to 38C (100.4F), Mild Pain (1 - 3)  aluminum hydroxide/magnesium hydroxide/simethicone Suspension 30 milliLiter(s) Oral every 4 hours PRN Dyspepsia  melatonin 3 milliGRAM(s) Oral at bedtime PRN Insomnia  ondansetron Injectable 4 milliGRAM(s) IV Push every 8 hours PRN Nausea and/or Vomiting    Pertinent Labs: Iron low ferritin high  Skin: no pressure injury    Estimated Needs:   [x ] no change since previous assessment based on current weight 152# 25-30kcals/kg 1722-2067kcals and 82-96gms protein 1.2-1.4gms/kg  [ ] recalculated:     Previous Nutrition Diagnosis:   [ ] Inadequate Energy Intake [ ]Inadequate Oral Intake [ ] Excessive Energy Intake   [ ] Underweight [ ] Increased Nutrient Needs [ ] Overweight/Obesity   [ ] Altered GI Function [ ] Unintended Weight Loss [ ] Food & Nutrition Related Knowledge Deficit [ x] Malnutrition     Nutrition Diagnosis is [ x] ongoing  [ ] resolved [ ] not applicable     New Nutrition Diagnosis: [x ] not applicable       Interventions:   Recommend  [ ] Change Diet To:  [x ] Nutrition Supplement if patient to remain in hospital recommend ensure plus HP BID  [ ] Nutrition Support  [x ] Other: recommend MVI     Monitoring and Evaluation:   [x ] PO intake [ x ] Tolerance to diet prescription [ x ] weights [ x ] labs[ x ] follow up per protocol  [ ] other:

## 2023-04-04 NOTE — DISCHARGE NOTE PROVIDER - CARE PROVIDERS DIRECT ADDRESSES
[email or web address],DirectAddress_[email or web address],DirectAddress_Unknown

## 2023-04-04 NOTE — DISCHARGE NOTE PROVIDER - NSDCCPCAREPLAN_GEN_ALL_CORE_FT
PRINCIPAL DISCHARGE DIAGNOSIS  Diagnosis: Anemia in neoplastic disease  Assessment and Plan of Treatment: You received 2 units PRBCs and your Hgb improved.      SECONDARY DISCHARGE DIAGNOSES  Diagnosis: General weakness  Assessment and Plan of Treatment: Likely due to your cancer and recent chemotherapy. Restart your home chemo medication- axitinib, at lower dose- switch from axitinib 5mg twice a day to axitinib 5mg daily. Follow up with Dr. Pritchard within 1-2 weeks of discharge.

## 2023-04-04 NOTE — DISCHARGE NOTE PROVIDER - HOSPITAL COURSE
ADMISSION DATE:  03-31-23    ---  FROM ADMISSION H+P:   HPI:  77-year-old male with past medical history of CAD s/p PCI with stent and CABG, hypertension, hyperlipidemia, newly diagnosed Left renal carcinoma with mets to the lung s/p left lower lung biopsy 1/2023, currently on axitinib 5 mg orally twice daily with chemoinfusion, last chemo was 2 weeks ago, presents to the emergency department by ambulance with report of fall. Patients states that since started chemo he has been feeling more week with difficulty ambulating, difficulty even holding objects at times and easily losing his balance. He also reports decreased appetite for the past few months along with diarrhea for the past 2 weeks. He stood up to go the bathroom today and felt weak/off balance, fell to the ground and had a BM in his pants. He was helped up by son in law and the ambulance was called. He states he hit head but denies hurting other parts of his body and denies any current pain at this time. He denies any dizziness, fainting, fever, chills, HA, cp, sob, N/V/C, blood urine or stool.   Patient was recently admitted 1/2023 for ACS event. At that time imaging studies revealed L renal lesion [id number] cm concerning for malignancy, LLL nodule concern for mets, and L4 lucent lesion c/f bone mets 1.5 x 1.4 cm. He has since started chemotherapy and takes axitinib 5 mg twice daily which he attributes to his new weakness.   Patient states he does not have a HCP and has not made decisions regarding code status.     ED course:   Vitals: T 97.8 HR 76 /78   Labs: Wbc 14.32 H/H 8.4/28.3 PT/INR 16.8/1.63 Na 134 AST//65  Lactate 2.5--> 2.1 pro BNP 2256  EKG: NSR HR 72   Given IL Bolus  (31 Mar 2023 22:44)      ---  HOSPITAL COURSE/PERTINENT LABS/PROCEDURES PERFORMED/PENDING TESTS:  Patient admitted to medicine for generalized weakness and falls at home. Patient with leukocytosis on admission, ID consult for concern for infection. Blood cx neg, urine cx with 10k-49k coag negative staph, patient without any urinary complaints, doubt infection as cause of leukocytosis, monitored off abx. Patient evaluated by Heme/Onc, recommended to hold oral chemo as it may be contributing to weakness and to transfuse 1 unit PRBC (received 2 units PRBC total during hospital stay. Spoke with patient's outpatient Heme/Onc- Dr. Pierson at [facility]- recommended that if patient going to [facility], will need to hold chemo infusions while at [facility], but recommend to restart oral chemo medication at lower dose as recent PET scan showing progressive disease. PT evaluated patient, recommending [facility]. Patient to be discharged to [facility] with close follow up with Heme/Onc, Cardio and PCP.    ---  PATIENT CONDITION:  - stable    ---  PHYSICAL EXAM ON DAY OF DISCHARGE:  General: laying in bed, NAD  Neurology: A&Ox3, nonfocal, GRADY x 4  Respiratory: CTA B/L  CV: RRR, S1S2, no murmurs, rubs or gallops  Abdominal: Soft, NT, ND +BS  Extremities: 1+ pitting edema B/L LE, + peripheral pulses    ---  CONSULTANTS:   Dr. Smith (Cardio)  Dr. Foley (ID)  Dr. Jay (Heme/onc)  PT    ---  ADVANCED CARE PLANNING:  - Code status:    full  - MOLST completed:      [ x ] NO     [  ] YES    ---  TIME SPENT:  I, the attending physician, was physically present for the key portions of the evaluation and management (E/M) service provided. The total amount of time spent reviewing the hospital notes, laboratory values, imaging findings, assessing/counseling the patient, discussing with consultant physicians, social work, nursing staff was 42 minutes

## 2023-04-04 NOTE — SOCIAL WORK PROGRESS NOTE - NSSWPROGRESSNOTE_GEN_ALL_CORE
SWI spoke to pt emergency contact/ dtr Anitha at telephone number [phone].  SWI followed up with dtr about [place] inquire.  Pt dtr reports he receives RX chemo medication Axitinib by mail.   Pt dtr reports pt he has the medication with him.  SWI spoke to pt emergency contact/ dtr Anitha at telephone number [phone].  SWI followed up with dtr about [place] inquire.  Pt dtr reports he receives RX chemo medication Axitinib by mail.   Pt dtr reports pt he has the medication with him.

## 2023-04-04 NOTE — DISCHARGE NOTE PROVIDER - CARE PROVIDER_API CALL
Josefa Alcantara ()  Internal Medicine  [address] [place] [address]  Phone: [phone]  Fax: [phone]  Follow Up Time:     Kiko Schultz)  Cardiovascular Disease; Internal Medicine  [address], [place] [id number]  Phone: [phone]  Fax: [phone]  Follow Up Time:     Dr. Pierson,   Phone: (   )    -  Fax: (   )    -  Follow Up Time:

## 2023-04-04 NOTE — PROGRESS NOTE ADULT - NS ATTEND AMEND GEN_ALL_CORE FT
fall likely 2/2 orthostasis and increased vagal tone  cad with med managed nstemi 1/23  dapt and statin  edema in setting of malnutrition/alb 1  check orthostatics

## 2023-04-04 NOTE — PROGRESS NOTE ADULT - SUBJECTIVE AND OBJECTIVE BOX
[facility] -- Luis Cha Pannella, Patel, Savella, Goodger: Office # [phone]    Follow Up:  CAD, HTN     Subjective/Observations: Patient awake, alert, resting in bed. Denies chest pain, palpitations and dizziness. Denies any difficulty breathing. No orthopnea and PND. Tolerating room air.     REVIEW OF SYSTEMS: All other review of systems are negative unless indicated above    PAST MEDICAL & SURGICAL HISTORY:  HTN - Hypertension      H/O hyperlipidemia      CAD, multiple vessel      Renal colic      Hypertension      Hypertension      S/P CABG x 3  1995      S/P coronary artery stent placement  2 stents - 3 years ago      S/P knee surgery  arthroscopic b/l      S/P hernia repair      S/P CABG x 4      S/P coronary artery stent placement    MEDICATIONS  (STANDING):  amLODIPine   Tablet 10 milliGRAM(s) Oral daily  aspirin  chewable 81 milliGRAM(s) Oral daily  atorvastatin 80 milliGRAM(s) Oral at bedtime  clopidogrel Tablet 75 milliGRAM(s) Oral daily  influenza  Vaccine (HIGH DOSE) 0.7 milliLiter(s) IntraMuscular once  isosorbide   mononitrate ER Tablet (IMDUR) 30 milliGRAM(s) Oral daily  lisinopril 20 milliGRAM(s) Oral daily  metoprolol succinate  milliGRAM(s) Oral daily  mirtazapine 15 milliGRAM(s) Oral at bedtime  sodium chloride 0.9%. 1000 milliLiter(s) (70 mL/Hr) IV Continuous <Continuous>    MEDICATIONS  (PRN):  acetaminophen     Tablet .. 650 milliGRAM(s) Oral every 6 hours PRN Temp greater or equal to 38C (100.4F), Mild Pain (1 - 3)  aluminum hydroxide/magnesium hydroxide/simethicone Suspension 30 milliLiter(s) Oral every 4 hours PRN Dyspepsia  melatonin 3 milliGRAM(s) Oral at bedtime PRN Insomnia  ondansetron Injectable 4 milliGRAM(s) IV Push every 8 hours PRN Nausea and/or Vomiting    Allergies    No Known Allergies    Intolerances      Vital Signs Last 24 Hrs  T(C): 36.9 (04 Apr 2023 05:07), Max: 37.3 (04 Apr 2023 02:20)  T(F): 98.5 (04 Apr 2023 05:07), Max: 99.1 (04 Apr 2023 02:20)  HR: 94 (04 Apr 2023 05:07) (75 - 94)  BP: 150/83 (04 Apr 2023 05:07) (143/72 - 167/84)  BP(mean): --  RR: 18 (04 Apr 2023 05:07) (17 - 18)  SpO2: 92% (04 Apr 2023 05:07) (92% - 94%)    Parameters below as of 04 Apr 2023 05:07  Patient On (Oxygen Delivery Method): room air      I&O's Summary    03 Apr 2023 07:01  -  04 Apr 2023 07:00  --------------------------------------------------------  IN: 490 mL / OUT: 450 mL / NET: 40 mL    04 Apr 2023 07:01  -  04 Apr 2023 11:04  --------------------------------------------------------  IN: 120 mL / OUT: 0 mL / NET: 120 mL          TELE: Not on telemetry   PHYSICAL EXAM:  Constitutional: NAD, awake and alert  HEENT: Moist Mucous Membranes, Anicteric  Pulmonary: Non-labored, breath sounds are clear bilaterally, No wheezing, rales or rhonchi  Cardiovascular: Regular, S1 and S2, No murmurs, No rubs, gallops or clicks  Gastrointestinal:  soft, nontender, nondistended   Lymph: trace peripheral edema. No lymphadenopathy.   Skin: No visible rashes or ulcers.  Psych:  Mood & affect appropriate      LABS: All Labs Reviewed:                        10.6   15.27 )-----------( 410      ( 04 Apr 2023 05:05 )             35.3                         9.2    12.58 )-----------( 418      ( 03 Apr 2023 06:58 )             31.3                         8.4    12.72 )-----------( 422      ( 02 Apr 2023 07:29 )             28.2     04 Apr 2023 05:05    140    |  111    |  15     ----------------------------<  81     3.7     |  22     |  0.71   03 Apr 2023 06:58    139    |  110    |  16     ----------------------------<  90     3.7     |  24     |  0.85   02 Apr 2023 07:29    138    |  110    |  14     ----------------------------<  87     3.5     |  23     |  0.77     Ca    8.4        04 Apr 2023 05:05  Ca    8.5        03 Apr 2023 06:58  Ca    8.2        02 Apr 2023 07:29    TPro  7.3    /  Alb  1.0    /  TBili  0.6    /  DBili  x      /  AST  38     /  ALT  29     /  AlkPhos  198    04 Apr 2023 05:05  TPro  7.3    /  Alb  1.1    /  TBili  0.4    /  DBili  x      /  AST  35     /  ALT  30     /  AlkPhos  204    03 Apr 2023 06:58  TPro  7.2    /  Alb  1.0    /  TBili  0.4    /  DBili  x      /  AST  36     /  ALT  34     /  AlkPhos  197    02 Apr 2023 07:29   LIVER FUNCTIONS - ( 04 Apr 2023 05:05 )  Alb: 1.0 g/dL / Pro: 7.3 g/dL / ALK PHOS: 198 U/L / ALT: 29 U/L / AST: 38 U/L / GGT: x           Troponin I, High Sensitivity Result: 14.7 ng/L (03-31-23 @ 20:53)  Lactate, Blood: 1.6 mmol/L (04-02-23 @ 07:29)    12 Lead ECG:   Ventricular Rate 71 BPM    Atrial Rate 71 BPM    P-R Interval 178 ms    QRS Duration 100 ms    Q-T Interval 430 ms    QTC Calculation(Bazett) 467 ms    P Axis 57 degrees    R Axis 20 degrees    T Axis 53 degrees    Diagnosis Line Normal sinus rhythm  Moderate voltage criteria for LVH, may be normal variant ( Sokolow-Gonzales , Kelby product )  Inferior infarct (cited on or before 04-JAN-2023)  poor rwp   Anterior infarct (cited on or before 04-MELY-2023)  Abnormal ECG  When compared with ECG of31-MAR-2023 20:09, (Unconfirmed)  No significant change was found  Confirmed by Shubham Smith MD ([id number]) on 4/2/2023 11:23:30 AM (03-31-23 @ 20:10)      ACC: [id number] EXAM:  ECHO TTE WO CON COMP W DOPP                          PROCEDURE DATE:  01/05/2023          INTERPRETATION:  INDICATION: Chest pain  Sonographer PH    Blood Pressure 136/72    Height 170 cm     Weight 75 kg    Dimensions:  LA 3.8      Normal Values: 2.0 - 4.0 cm  Ao 3.2        Normal Values: 2.0 - 3.8 cm  SEPTUM 0.9       Normal Values: 0.6 - 1.2 cm  PWT 0.8       Normal Values: 0.6 - 1.1 cm  LVIDd 4.0         Normal Values: 3.0 - 5.6 cm  LVIDs 2.7         Normal Values: 1.8- 4.0 cm      OBSERVATIONS:  Mitral Valve: Mitral annular calcification with thickened leaflets. Mild   MR.  Aortic Valve/Aorta: Calcified trileaflet aortic valve with grossly normal   opening.  Tricuspid Valve: normal with trace TR.  Pulmonic Valve:Not well-visualized  Left Atrium: normal  Right Atrium: Not well-visualized  Left Ventricle: normal LV size and systolic function, estimated LVEF of   55-60%. There is hypokinesis of the mid to basal inferior wall  Right Ventricle: Grossly normal size and systolic function.  Pericardium: no significant pericardial effusion.    IMPRESSION:  normal LV size and systolic function, estimated LVEF of 55-60%. There is   hypokinesis of the mid to basal inferior wall  Grossly normal RV size and systolic function.  Calcified trileaflet aortic valve with grossly normal opening, without AI.  Mild MR  Trace TR.  No significant pericardial effusion.  --- End of Report ---  KIMBERLY GODFREY MD; Attending Cardiologist  This document has been electronically signed. Jan 6 2023 11:06AM  
[facility]  INFECTIOUS DISEASES - [address], [place] [address]  Tel: [phone]     Fax: [phone]  =======================================================    LOUIE SCHROEDER [id number]    Follow up: No fevers.  Still feels tired but denies any pain, SOB, nausea, diarrhea or dysuria. No diarrhea for the past 2 days.    Allergies:  No Known Allergies      Antibiotics:  acetaminophen     Tablet .. 650 milliGRAM(s) Oral every 6 hours PRN  aluminum hydroxide/magnesium hydroxide/simethicone Suspension 30 milliLiter(s) Oral every 4 hours PRN  amLODIPine   Tablet 10 milliGRAM(s) Oral daily  aspirin  chewable 81 milliGRAM(s) Oral daily  atorvastatin 80 milliGRAM(s) Oral at bedtime  clopidogrel Tablet 75 milliGRAM(s) Oral daily  influenza  Vaccine (HIGH DOSE) 0.7 milliLiter(s) IntraMuscular once  isosorbide   mononitrate ER Tablet (IMDUR) 30 milliGRAM(s) Oral daily  lisinopril 20 milliGRAM(s) Oral daily  melatonin 3 milliGRAM(s) Oral at bedtime PRN  metoprolol succinate  milliGRAM(s) Oral daily  mirtazapine 15 milliGRAM(s) Oral at bedtime  ondansetron Injectable 4 milliGRAM(s) IV Push every 8 hours PRN  sodium chloride 0.9%. 1000 milliLiter(s) IV Continuous <Continuous>       REVIEW OF SYSTEMS:  CONSTITUTIONAL:  No Fever or chills  HEENT:   No sore throat or runny nose.  CARDIOVASCULAR:  No chest pain or SOB  RESPIRATORY:  No cough, shortness of breath  GASTROINTESTINAL:  No nausea, vomiting or diarrhea.  GENITOURINARY:  No dysuria, frequency or urgency  MUSCULOSKELETAL:  no joint aches, no back pain  NEUROLOGIC:  No headache or dizziness  PSYCHIATRIC:  No disorder of thought or mood.     Physical Exam:  ICU Vital Signs Last 24 Hrs  T(C): 36.9 (03 Apr 2023 19:03), Max: 36.9 (03 Apr 2023 05:20)  T(F): 98.4 (03 Apr 2023 19:03), Max: 98.5 (03 Apr 2023 05:20)  HR: 90 (03 Apr 2023 19:03) (75 - 94)  BP: 167/84 (03 Apr 2023 19:03) (143/72 - 167/84)  BP(mean): --  ABP: --  ABP(mean): --  RR: 18 (03 Apr 2023 19:03) (17 - 19)  SpO2: 94% (03 Apr 2023 19:03) (91% - 94%)    O2 Parameters below as of 03 Apr 2023 19:03  Patient On (Oxygen Delivery Method): room air           GEN: NAD  HEENT: normocephalic and atraumatic.  NECK: Supple.   LUNGS: Clear to auscultation.  HEART: Regular rate and rhythm   ABDOMEN: Soft, nontender, and nondistended.    EXTREMITIES: No leg edema.  MSK: no joint swelling  NEUROLOGIC: grossly intact.  PSYCHIATRIC: Appropriate affect .      Labs:  04-03    139  |  110<H>  |  16  ----------------------------<  90  3.7   |  24  |  0.85    Ca    8.5      03 Apr 2023 06:58    TPro  7.3  /  Alb  1.1<L>  /  TBili  0.4  /  DBili  x   /  AST  35  /  ALT  30  /  AlkPhos  204<H>  04-03                          9.2    12.58 )-----------( 418      ( 03 Apr 2023 06:58 )             31.3         LIVER FUNCTIONS - ( 03 Apr 2023 06:58 )  Alb: 1.1 g/dL / Pro: 7.3 g/dL / ALK PHOS: 204 U/L / ALT: 30 U/L / AST: 35 U/L / GGT: x             RECENT CULTURES:  04-01 @ 01:59 Clean Catch Clean Catch (Midstream)     10,000 - 49,000 CFU/mL Coag Negative Staphylococcus "Susceptibilities not  performed"  <10,000 CFU/ml Normal Urogenital sridhar present        03-31 @ 20:53 .Blood Blood-Peripheral     No growth to date.      [facility]  03-31 @ 20:45 .Blood Blood-Peripheral     No growth to date.              All imaging and data are reviewed.   
INTERVAL HPI/OVERNIGHT EVENTS:  Patient seen and examined at bedside. States he is still feeling very weak and fatigued. Denies any chest pain, SOB, abd pain.    MEDICATIONS  (STANDING):  amLODIPine   Tablet 10 milliGRAM(s) Oral daily  aspirin  chewable 81 milliGRAM(s) Oral daily  atorvastatin 80 milliGRAM(s) Oral at bedtime  clopidogrel Tablet 75 milliGRAM(s) Oral daily  influenza  Vaccine (HIGH DOSE) 0.7 milliLiter(s) IntraMuscular once  isosorbide   mononitrate ER Tablet (IMDUR) 30 milliGRAM(s) Oral daily  lisinopril 20 milliGRAM(s) Oral daily  metoprolol succinate  milliGRAM(s) Oral daily  mirtazapine 15 milliGRAM(s) Oral at bedtime  sodium chloride 0.9%. 1000 milliLiter(s) (70 mL/Hr) IV Continuous <Continuous>    MEDICATIONS  (PRN):  acetaminophen     Tablet .. 650 milliGRAM(s) Oral every 6 hours PRN Temp greater or equal to 38C (100.4F), Mild Pain (1 - 3)  aluminum hydroxide/magnesium hydroxide/simethicone Suspension 30 milliLiter(s) Oral every 4 hours PRN Dyspepsia  melatonin 3 milliGRAM(s) Oral at bedtime PRN Insomnia  ondansetron Injectable 4 milliGRAM(s) IV Push every 8 hours PRN Nausea and/or Vomiting      Allergies    No Known Allergies    Intolerances      ROS:  CONSTITUTIONAL: +generalized weakness, no fevers or chills  EYES/ENT: No visual changes;  No vertigo or throat pain   NECK: No pain or stiffness  RESPIRATORY: No cough, wheezing, hemoptysis; No shortness of breath  CARDIOVASCULAR: No chest pain or palpitations  GASTROINTESTINAL: No abdominal or epigastric pain. No nausea, vomiting, or hematemesis; +loss of appetite  GENITOURINARY: No dysuria, frequency or hematuria  NEUROLOGICAL: No numbness   All other review of systems is negative unless indicated above.    Vital Signs Last 24 Hrs  T(C): 36.8 (03 Apr 2023 22:15), Max: 36.9 (03 Apr 2023 05:20)  T(F): 98.3 (03 Apr 2023 22:15), Max: 98.5 (03 Apr 2023 05:20)  HR: 83 (03 Apr 2023 22:15) (75 - 94)  BP: 148/70 (03 Apr 2023 22:15) (143/72 - 167/84)  BP(mean): --  RR: 18 (03 Apr 2023 22:15) (17 - 19)  SpO2: 92% (03 Apr 2023 22:15) (91% - 94%)    Parameters below as of 03 Apr 2023 22:15  Patient On (Oxygen Delivery Method): room air          Physical Exam:  General: laying in bed, NAD  Neurology: A&Ox3, nonfocal, GRADY x 4  Respiratory: CTA B/L  CV: RRR, S1S2, no murmurs, rubs or gallops  Abdominal: Soft, NT, ND +BS  Extremities: 1+ pitting edema B/L LE, + peripheral pulses      LABS:                        9.2    12.58 )-----------( 418      ( 03 Apr 2023 06:58 )             31.3     04-03    139  |  110<H>  |  16  ----------------------------<  90  3.7   |  24  |  0.85    Ca    8.5      03 Apr 2023 06:58    TPro  7.3  /  Alb  1.1<L>  /  TBili  0.4  /  DBili  x   /  AST  35  /  ALT  30  /  AlkPhos  204<H>  04-03          RADIOLOGY & ADDITIONAL TESTS:
[facility]  INFECTIOUS DISEASES   [address] [place] [address]  Tel: [phone]     Fax: [phone]  =======================================================      LOUIE SCHROEDER [id number]    Follow up: Leukocytosis     no fever      Allergies:  No Known Allergies       REVIEW OF SYSTEMS:  CONSTITUTIONAL:  No Fever or chills  HEENT:  No diplopia or blurred vision.  No earache, sore throat or runny nose.  CARDIOVASCULAR:  No chest pain  RESPIRATORY:  No cough, shortness of breath  GASTROINTESTINAL:  No nausea, vomiting.   GENITOURINARY:  No dysuria, frequency or urgency. No Blood in urine  MUSCULOSKELETAL:  no joint aches, no muscle pain  SKIN:  No change in skin, hair or nails.  NEUROLOGIC:  No Headaches  PSYCHIATRIC:  No disorder of thought or mood.  ENDOCRINE:  No heat or cold intolerance  HEMATOLOGICAL:  No easy bruising or bleeding.       Physical Exam:  GEN: NAD  HEENT: normocephalic and atraumatic. EOMI. PERRL.    NECK: Supple.   LUNGS: CTA B/L.  HEART: RRR  ABDOMEN: Soft, NT, ND.  +BS.    : No CVA tenderness  EXTREMITIES: Without  edema.  MSK: No joint swelling  NEUROLOGIC: No Focal Deficits   PSYCHIATRIC: Appropriate affect .  SKIN: No rash      Vitals:  T(F): 98 (02 Apr 2023 05:23), Max: 98 (01 Apr 2023 20:37)  HR: 87 (02 Apr 2023 05:23)  BP: 135/74 (02 Apr 2023 05:23)  RR: 18 (02 Apr 2023 05:23)  SpO2: 91% (02 Apr 2023 05:23) (91% - 94%)  temp max in last 48H T(F): , Max: 98.2 (04-01-23 @ 01:00)      Current Antibiotics:    Other medications:  amLODIPine   Tablet 10 milliGRAM(s) Oral daily  aspirin  chewable 81 milliGRAM(s) Oral daily  atorvastatin 80 milliGRAM(s) Oral at bedtime  Axitinib 5 mG/Dose 1 Tablet(s) Oral two times a day  clopidogrel Tablet 75 milliGRAM(s) Oral daily  influenza  Vaccine (HIGH DOSE) 0.7 milliLiter(s) IntraMuscular once  isosorbide   mononitrate ER Tablet (IMDUR) 30 milliGRAM(s) Oral daily  lisinopril 20 milliGRAM(s) Oral daily  metoprolol succinate  milliGRAM(s) Oral daily  mirtazapine 15 milliGRAM(s) Oral at bedtime  sodium chloride 0.9%. 1000 milliLiter(s) IV Continuous <Continuous>                            8.7    13.21 )-----------( 393      ( 01 Apr 2023 06:05 )             27.9     04-01    138  |  110<H>  |  15  ----------------------------<  77  3.5   |  22  |  0.63    Ca    8.4<L>      01 Apr 2023 06:05  Mg     2.6     03-31    TPro  7.0  /  Alb  1.0<L>  /  TBili  0.4  /  DBili  x   /  AST  31  /  ALT  32  /  AlkPhos  185<H>  04-01    RECENT CULTURES:  03-31 @ 20:53 .Blood Blood-Peripheral     No growth to date.    03-31 @ 20:45 .Blood Blood-Peripheral     No growth to date.      WBC Count: 13.21 K/uL (04-01-23 @ 06:05)  WBC Count: 14.32 K/uL (03-31-23 @ 20:53)    Creatinine, Serum: 0.63 mg/dL (04-01-23 @ 06:05)  Creatinine, Serum: 0.99 mg/dL (03-31-23 @ 20:53)    SARS-CoV-2: NotDetec (03-31-23 @ 20:53)    
[INTERVAL HX: ]  Patient seen and examined;  Chart reviewed and events noted;     c/o weakness with standing  LH with position change   +weakness    felt better post PRBC transfusion    no CP    [MEDICATIONS]  MEDICATIONS  (STANDING):  amLODIPine   Tablet 10 milliGRAM(s) Oral daily  aspirin  chewable 81 milliGRAM(s) Oral daily  atorvastatin 80 milliGRAM(s) Oral at bedtime  clopidogrel Tablet 75 milliGRAM(s) Oral daily  influenza  Vaccine (HIGH DOSE) 0.7 milliLiter(s) IntraMuscular once  isosorbide   mononitrate ER Tablet (IMDUR) 30 milliGRAM(s) Oral daily  lisinopril 20 milliGRAM(s) Oral daily  metoprolol succinate  milliGRAM(s) Oral daily  mirtazapine 15 milliGRAM(s) Oral at bedtime  sodium chloride 0.9%. 1000 milliLiter(s) (70 mL/Hr) IV Continuous <Continuous>    MEDICATIONS  (PRN):  acetaminophen     Tablet .. 650 milliGRAM(s) Oral every 6 hours PRN Temp greater or equal to 38C (100.4F), Mild Pain (1 - 3)  aluminum hydroxide/magnesium hydroxide/simethicone Suspension 30 milliLiter(s) Oral every 4 hours PRN Dyspepsia  melatonin 3 milliGRAM(s) Oral at bedtime PRN Insomnia  ondansetron Injectable 4 milliGRAM(s) IV Push every 8 hours PRN Nausea and/or Vomiting      [VITALS]  Vital Signs Last 24 Hrs  T(C): 36.6 (2023 12:49), Max: 36.9 (2023 05:20)  T(F): 97.8 (2023 12:49), Max: 98.5 (2023 05:20)  HR: 75 (2023 12:49) (75 - 94)  BP: 143/72 (2023 12:49) (143/72 - 149/77)  BP(mean): --  RR: 17 (2023 12:49) (17 - 19)  SpO2: 93% (2023 12:49) (91% - 93%)    Parameters below as of 2023 12:49  Patient On (Oxygen Delivery Method): room air      [WT/HT]  Daily     Daily Weight in k (2023 05:20)  [VENT]      [PHYSICAL EXAM]  GEN: NAD  HEENT: normocephalic and atraumatic. EOMI. PERRL.    NECK: Supple.  No lymphadenopathy   LUNGS: Clear to auscultation.  HEART: Regular rate and rhythm,  no MRG  ABDOMEN: Soft, nontender, and nondistended.  Positive bowel sounds.    : No CVA tenderness  EXTREMITIES: Without edema.  NEUROLOGIC: grossly intact.  PSYCHIATRIC: Appropriate affect .  SKIN: No rash     [LABS:]                        9.2    12.58 )-----------( 418      ( 2023 06:58 )             31.3     04-03    139  |  110<H>  |  16  ----------------------------<  90  3.7   |  24  |  0.85    Ca    8.5      2023 06:58    TPro  7.3  /  Alb  1.1<L>  /  TBili  0.4  /  DBili  x   /  AST  35  /  ALT  30  /  AlkPhos  204<H>  04-03          Iron - Total Binding Capacity.: 148 ug/dL *L* [220 - 430] (23 @ 22:30)  Vitamin B12, Serum: 1276 pg/mL *H* [232 - 1245] (23 @ 22:30)  Folate, Serum: 14.2 ng/mL (23 @ 22:30)  Ferritin, Serum: 1938 ng/mL *H* [30 - 400] (23 @ 22:30)  Iron - Total Binding Capacity.: 251 ug/dL [220 - 430] (23 @ 09:35)  Iron - Total Binding Capacity.: 264 ug/dL [220 - 430] (23 @ 06:45)  Ferritin, Serum: 860 ng/mL *H* [30 - 400] (23 @ 06:45)  Vitamin B12, Serum: 912 pg/mL [232 - 1245] (23 @ 06:45)  Folate, Serum: >20.0 ng/mL (23 @ 06:45)  Sedimentation Rate, Erythrocyte: 119 mm/hr *H* [0 - 20] (23 @ 06:45)  Ferritin, Serum: 889 ng/mL *H* [30 - 400] (23 @ 19:16)  Vitamin B12, Serum: 905 pg/mL [232 - 1245] (23 @ 19:16)  Folate, Serum: >20.0 ng/mL (23 @ 19:16)        Culture - Urine (collected 2023 01:59)  Source: Clean Catch Clean Catch (Midstream)  Final Report (2023 15:48):    10,000 - 49,000 CFU/mL Coag Negative Staphylococcus "Susceptibilities not    performed"    <10,000 CFU/ml Normal Urogenital sridhar present    Culture - Blood (collected 31 Mar 2023 20:53)  Source: .Blood Blood-Peripheral  Preliminary Report (2023 01:02):    No growth to date.    Culture - Blood (collected 31 Mar 2023 20:45)  Source: .Blood Blood-Peripheral  Preliminary Report (2023 01:02):    No growth to date.      SARS-CoV-2: NotDetec (31 Mar 2023 20:53)  COVID-19 PCR: NotDetec (2023 10:25)  COVID-19 PCR: NotDetec (2023 03:10)        Culture - Urine (collected 2023 01:59)  Source: Clean Catch Clean Catch (Midstream)  Final Report (2023 15:48):    10,000 - 49,000 CFU/mL Coag Negative Staphylococcus "Susceptibilities not    performed"    <10,000 CFU/ml Normal Urogenital sridhar present    Culture - Blood (collected 31 Mar 2023 20:53)  Source: .Blood Blood-Peripheral  Preliminary Report (2023 01:02):    No growth to date.    Culture - Blood (collected 31 Mar 2023 20:45)  Source: .Blood Blood-Peripheral  Preliminary Report (2023 01:02):    No growth to date.        [RADIOLOGY STUDIES:]    
Interval History:  still feels weak  Chart reviewed and events noted;   Overnight events:    MEDICATIONS  (STANDING):  amLODIPine   Tablet 10 milliGRAM(s) Oral daily  aspirin  chewable 81 milliGRAM(s) Oral daily  atorvastatin 80 milliGRAM(s) Oral at bedtime  Axitinib 5 mG/Dose 1 Tablet(s) Oral two times a day  clopidogrel Tablet 75 milliGRAM(s) Oral daily  influenza  Vaccine (HIGH DOSE) 0.7 milliLiter(s) IntraMuscular once  isosorbide   mononitrate ER Tablet (IMDUR) 30 milliGRAM(s) Oral daily  lisinopril 20 milliGRAM(s) Oral daily  metoprolol succinate  milliGRAM(s) Oral daily  mirtazapine 15 milliGRAM(s) Oral at bedtime  sodium chloride 0.9%. 1000 milliLiter(s) (70 mL/Hr) IV Continuous <Continuous>    MEDICATIONS  (PRN):  acetaminophen     Tablet .. 650 milliGRAM(s) Oral every 6 hours PRN Temp greater or equal to 38C (100.4F), Mild Pain (1 - 3)  aluminum hydroxide/magnesium hydroxide/simethicone Suspension 30 milliLiter(s) Oral every 4 hours PRN Dyspepsia  melatonin 3 milliGRAM(s) Oral at bedtime PRN Insomnia  ondansetron Injectable 4 milliGRAM(s) IV Push every 8 hours PRN Nausea and/or Vomiting      Vital Signs Last 24 Hrs  T(C): 36.6 (02 Apr 2023 13:48), Max: 36.7 (01 Apr 2023 20:37)  T(F): 97.8 (02 Apr 2023 13:48), Max: 98 (01 Apr 2023 20:37)  HR: 71 (02 Apr 2023 13:48) (71 - 87)  BP: 147/76 (02 Apr 2023 13:48) (135/74 - 147/76)  BP(mean): --  RR: 16 (02 Apr 2023 13:48) (16 - 18)  SpO2: 92% (02 Apr 2023 13:48) (91% - 93%)    Parameters below as of 02 Apr 2023 13:48  Patient On (Oxygen Delivery Method): room air        PHYSICAL EXAM  General: adult in NAD, chronically ill appearing  HEENT: clear oropharynx, anicteric sclera, pink conjunctivae  Neck: supple  CV: normal S1S2 with no murmur rubs or gallops  Lungs: clear to auscultation, no wheezes, no rhales  Abdomen: soft non-tender non-distended, no hepato/splenomegaly  Ext: no clubbing cyanosis or edema  Skin: no rashes and no petichiae  Neuro: alert and oriented X3 no focal deficits      LABS:  CBC Full  -  ( 02 Apr 2023 07:29 )  WBC Count : 12.72 K/uL  RBC Count : 3.58 M/uL  Hemoglobin : 8.4 g/dL  Hematocrit : 28.2 %  Platelet Count - Automated : 422 K/uL  Mean Cell Volume : 78.8 fl  Mean Cell Hemoglobin : 23.5 pg  Mean Cell Hemoglobin Concentration : 29.8 gm/dL  Auto Neutrophil # : 10.35 K/uL  Auto Lymphocyte # : 1.36 K/uL  Auto Monocyte # : 0.79 K/uL  Auto Eosinophil # : 0.06 K/uL  Auto Basophil # : 0.02 K/uL  Auto Neutrophil % : 81.3 %  Auto Lymphocyte % : 10.7 %  Auto Monocyte % : 6.2 %  Auto Eosinophil % : 0.5 %  Auto Basophil % : 0.2 %    04-02    138  |  110<H>  |  14  ----------------------------<  87  3.5   |  23  |  0.77    Ca    8.2<L>      02 Apr 2023 07:29  Mg     2.6     03-31    TPro  7.2  /  Alb  1.0<L>  /  TBili  0.4  /  DBili  x   /  AST  36  /  ALT  34  /  AlkPhos  197<H>  04-02    PT/INR - ( 31 Mar 2023 20:53 )   PT: 16.8 sec;   INR: 1.43 ratio         PTT - ( 31 Mar 2023 20:53 )  PTT:37.1 sec    fe studies  Iron - Total Binding Capacity.: 148 ug/dL (03-31 @ 22:30)  Ferritin, Serum: 1938 ng/mL (03-31 @ 22:30)      WBC trend  12.72 K/uL (04-02-23 @ 07:29)  13.21 K/uL (04-01-23 @ 06:05)  14.32 K/uL (03-31-23 @ 20:53)      Hgb trend  8.4 g/dL (04-02-23 @ 07:29)  8.7 g/dL (04-01-23 @ 06:05)  8.4 g/dL (03-31-23 @ 20:53)      plt trend  422 K/uL (04-02-23 @ 07:29)  393 K/uL (04-01-23 @ 06:05)  499 K/uL (03-31-23 @ 20:53)        RADIOLOGY & ADDITIONAL STUDIES:    
INTERVAL HPI/OVERNIGHT EVENTS: Patient seen and examined at bedside. States he is feeling very fatigued and tired this AM. States he does not feel he can even get out of bed. Denies any chest pain, SOB, abd pain.     MEDICATIONS  (STANDING):  amLODIPine   Tablet 10 milliGRAM(s) Oral daily  aspirin  chewable 81 milliGRAM(s) Oral daily  atorvastatin 80 milliGRAM(s) Oral at bedtime  Axitinib 5 mG/Dose 1 Tablet(s) Oral two times a day  clopidogrel Tablet 75 milliGRAM(s) Oral daily  influenza  Vaccine (HIGH DOSE) 0.7 milliLiter(s) IntraMuscular once  isosorbide   mononitrate ER Tablet (IMDUR) 30 milliGRAM(s) Oral daily  lisinopril 20 milliGRAM(s) Oral daily  metoprolol succinate  milliGRAM(s) Oral daily  mirtazapine 15 milliGRAM(s) Oral at bedtime  sodium chloride 0.9%. 1000 milliLiter(s) (70 mL/Hr) IV Continuous <Continuous>    MEDICATIONS  (PRN):  acetaminophen     Tablet .. 650 milliGRAM(s) Oral every 6 hours PRN Temp greater or equal to 38C (100.4F), Mild Pain (1 - 3)  aluminum hydroxide/magnesium hydroxide/simethicone Suspension 30 milliLiter(s) Oral every 4 hours PRN Dyspepsia  melatonin 3 milliGRAM(s) Oral at bedtime PRN Insomnia  ondansetron Injectable 4 milliGRAM(s) IV Push every 8 hours PRN Nausea and/or Vomiting      Allergies    No Known Allergies    Intolerances      ROS:  CONSTITUTIONAL: +generalized weakness, no fevers or chills  EYES/ENT: No visual changes;  No vertigo or throat pain   NECK: No pain or stiffness  RESPIRATORY: No cough, wheezing, hemoptysis; No shortness of breath  CARDIOVASCULAR: No chest pain or palpitations  GASTROINTESTINAL: No abdominal or epigastric pain. No nausea, vomiting, or hematemesis; +loss of appetite  GENITOURINARY: No dysuria, frequency or hematuria  NEUROLOGICAL: No numbness   All other review of systems is negative unless indicated above.    Vital Signs Last 24 Hrs  T(C): 36.6 (2023 13:48), Max: 36.7 (2023 20:37)  T(F): 97.8 (2023 13:48), Max: 98 (2023 20:37)  HR: 71 (2023 13:48) (71 - 87)  BP: 147/76 (2023 13:48) (135/74 - 147/76)  BP(mean): --  RR: 16 (2023 13:48) (16 - 18)  SpO2: 92% (2023 13:48) (91% - 93%)    Parameters below as of 2023 13:48  Patient On (Oxygen Delivery Method): room air        Physical Exam:  General: laying in bed, NAD  Neurology: A&Ox3, nonfocal, GRADY x 4  Respiratory: CTA B/L  CV: RRR, S1S2, no murmurs, rubs or gallops  Abdominal: Soft, NT, ND +BS  Extremities: 1+ pitting edema B/L LE, + peripheral pulses    LABS:                        8.4    12.72 )-----------( 422      ( 2023 07:29 )             28.2     04-02    138  |  110<H>  |  14  ----------------------------<  87  3.5   |  23  |  0.77    Ca    8.2<L>      2023 07:29  Mg     2.6     03-31    TPro  7.2  /  Alb  1.0<L>  /  TBili  0.4  /  DBili  x   /  AST  36  /  ALT  34  /  AlkPhos  197<H>  04-02    PT/INR - ( 31 Mar 2023 20:53 )   PT: 16.8 sec;   INR: 1.43 ratio         PTT - ( 31 Mar 2023 20:53 )  PTT:37.1 sec  Urinalysis Basic - ( 2023 01:59 )    Color: Yellow / Appearance: Clear / S.015 / pH: x  Gluc: x / Ketone: Negative  / Bili: Negative / Urobili: Negative   Blood: x / Protein: 30 mg/dL / Nitrite: Negative   Leuk Esterase: Trace / RBC: 3-5 /HPF / WBC 3-5   Sq Epi: x / Non Sq Epi: Occasional / Bacteria: x        RADIOLOGY & ADDITIONAL TESTS:
INTERVAL HPI/OVERNIGHT EVENTS:  Patient seen and examined at bedside. States he is feeling weak and fatigued. Complains of poor appetite over the past week. Denies any chest pain, SOB, abd pain.     MEDICATIONS  (STANDING):  amLODIPine   Tablet 10 milliGRAM(s) Oral daily  aspirin  chewable 81 milliGRAM(s) Oral daily  Axitinib 5 mG/Dose 1 Tablet(s) Oral two times a day  clopidogrel Tablet 75 milliGRAM(s) Oral daily  influenza  Vaccine (HIGH DOSE) 0.7 milliLiter(s) IntraMuscular once  isosorbide   mononitrate ER Tablet (IMDUR) 30 milliGRAM(s) Oral daily  lisinopril 20 milliGRAM(s) Oral daily  metoprolol succinate  milliGRAM(s) Oral daily  mirtazapine 15 milliGRAM(s) Oral at bedtime  sodium chloride 0.9%. 1000 milliLiter(s) (70 mL/Hr) IV Continuous <Continuous>    MEDICATIONS  (PRN):  acetaminophen     Tablet .. 650 milliGRAM(s) Oral every 6 hours PRN Temp greater or equal to 38C (100.4F), Mild Pain (1 - 3)  aluminum hydroxide/magnesium hydroxide/simethicone Suspension 30 milliLiter(s) Oral every 4 hours PRN Dyspepsia  melatonin 3 milliGRAM(s) Oral at bedtime PRN Insomnia  ondansetron Injectable 4 milliGRAM(s) IV Push every 8 hours PRN Nausea and/or Vomiting      Allergies    No Known Allergies    Intolerances      ROS:  CONSTITUTIONAL: +generalized weakness, no fevers or chills  EYES/ENT: No visual changes;  No vertigo or throat pain   NECK: No pain or stiffness  RESPIRATORY: No cough, wheezing, hemoptysis; No shortness of breath  CARDIOVASCULAR: No chest pain or palpitations  GASTROINTESTINAL: No abdominal or epigastric pain. No nausea, vomiting, or hematemesis; +loss of appetite  GENITOURINARY: No dysuria, frequency or hematuria  NEUROLOGICAL: No numbness   All other review of systems is negative unless indicated above.    Vital Signs Last 24 Hrs  T(C): 36.2 (2023 14:21), Max: 36.8 (2023 01:00)  T(F): 97.1 (2023 14:21), Max: 98.2 (2023 01:00)  HR: 69 (2023 14:21) (66 - 78)  BP: 139/73 (2023 14:21) (138/78 - 155/82)  BP(mean): --  RR: 17 (2023 14:21) (17 - 20)  SpO2: 93% (2023 14:21) (93% - 97%)    Parameters below as of 2023 14:21  Patient On (Oxygen Delivery Method): room air        Physical Exam:  General: laying in bed, NAD  Neurology: A&Ox3, nonfocal, GRADY x 4  Respiratory: CTA B/L  CV: RRR, S1S2, no murmurs, rubs or gallops  Abdominal: Soft, NT, ND +BS  Extremities: 1+ pitting edema B/L LE, + peripheral pulses      LABS:                        8.7    13.21 )-----------( 393      ( 2023 06:05 )             27.9     04-01    138  |  110<H>  |  15  ----------------------------<  77  3.5   |  22  |  0.63    Ca    8.4<L>      2023 06:05  Mg     2.6     03-31    TPro  7.0  /  Alb  1.0<L>  /  TBili  0.4  /  DBili  x   /  AST  31  /  ALT  32  /  AlkPhos  185<H>  04-01    PT/INR - ( 31 Mar 2023 20:53 )   PT: 16.8 sec;   INR: 1.43 ratio         PTT - ( 31 Mar 2023 20:53 )  PTT:37.1 sec  Urinalysis Basic - ( 2023 01:59 )    Color: Yellow / Appearance: Clear / S.015 / pH: x  Gluc: x / Ketone: Negative  / Bili: Negative / Urobili: Negative   Blood: x / Protein: 30 mg/dL / Nitrite: Negative   Leuk Esterase: Trace / RBC: 3-5 /HPF / WBC 3-5   Sq Epi: x / Non Sq Epi: Occasional / Bacteria: x        RADIOLOGY & ADDITIONAL TESTS:

## 2023-04-04 NOTE — PROGRESS NOTE ADULT - ASSESSMENT
77-year-old male with past medical history of CAD, cardiac stent, CABG x3 vessels, hypertension, hyperlipidemia, newly diagnosed Left renal carcinoma with mets to the lung s/p left lower lung biopsy 1/2023, currently on axitinib 5 mg orally twice daily with chemoinfusion, last chemo was 2 weeks ago, presents to the emergency department by ambulance with report of fall. Admit for weakness, acute on chronic anemia and leukocytosis.    Fall, CAD, CABG, stents, HTN, HLD  - Fall likely 2/2 orthostasis and increased vagal tone  - Check orthostatics  - Monitor on remote tele    - Has sig underlying CAD.  History of prior NSTEMI in 1/2023 which was medically managed.   - No clear evidence of acute ischemia, trops negative x 2.    - EKG: NSR, rate 71  - Continue aspirin, Plavix and statin      - Previous TTE 1/5/2023 shows LVEF 55-60%, hypokinesis of mid to basal LV inferior wall, mild MR, trace TR, no need to repeat   - Appears compensated from HF POV.   - Bilateral lower extremity edema on exam, in setting of hypoalbuminemia 1.0    - BP elevated with -160s   - Continue Norvasc 10, Imdur 30, lisinopril 20 and Toprol 200  - Can increase Imdur to 60 if orthostatics negative     - Heme/onc following   - Monitor and replete lytes, keep K>4, Mg>2.  - Will continue to follow.    Reinier Celis, MS FNP, [facility]  Nurse Practitioner- Cardiology   Spectra #[phone]

## 2023-04-04 NOTE — PROGRESS NOTE ADULT - PROVIDER SPECIALTY LIST ADULT
Infectious Disease
Infectious Disease
Cardiology
Heme/Onc
Heme/Onc
Hospitalist

## 2023-04-04 NOTE — DISCHARGE NOTE PROVIDER - NSDCMRMEDTOKEN_GEN_ALL_CORE_FT
acetaminophen 325 mg oral tablet: 2 tab(s) orally every 6 hours As needed Temp greater or equal to 38C (100.4F), Mild Pain (1 - 3)  amLODIPine 10 mg oral tablet: 1 tab(s) orally once a day  aspirin 81 mg oral tablet: orally once a day  atorvastatin 80 mg oral tablet: 1 tab(s) orally once a day  axitinib 5 mg oral tablet: 1 cap(s) orally once a day  ezetimibe 10 mg oral tablet: 1 tab(s) orally once a day  isosorbide mononitrate 30 mg oral tablet, extended release: 1 tab(s) orally once a day (in the morning)  metoprolol succinate 200 mg oral tablet, extended release: 1 tab(s) orally once a day  mirtazapine 15 mg oral tablet: 1 tab(s) orally once a day (at bedtime)  Plavix 75 mg oral tablet: 1 tab(s) orally once a day   quinapril 20 mg oral tablet: 1 tab(s) orally once a day

## 2023-04-04 NOTE — DISCHARGE NOTE NURSING/CASE MANAGEMENT/SOCIAL WORK - PATIENT PORTAL LINK FT
You can access the FollowMyHealth Patient Portal offered by [facility] by registering at the following website: [email or web address]. By joining [employer]’s FollowMyHealth portal, you will also be able to view your health information using other applications (apps) compatible with our system.

## 2023-04-04 NOTE — DISCHARGE NOTE NURSING/CASE MANAGEMENT/SOCIAL WORK - NSPROEXTENSIONSOFSELF_GEN_A_NUR
One yellow metal necklace with yellow metal cross and #13 pendant.  One cell phone/eyeglasses Albendazole Counseling:  I discussed with the patient the risks of albendazole including but not limited to cytopenia, kidney damage, nausea/vomiting and severe allergy.  The patient understands that this medication is being used in an off-label manner.

## 2023-04-04 NOTE — DISCHARGE NOTE PROVIDER - NSDCFUSCHEDAPPT_GEN_ALL_CORE_FT
[facility]  [facility] [address] [facility]  Scheduled Appointment: 04/06/2023

## 2023-04-04 NOTE — DISCHARGE NOTE PROVIDER - PROVIDER TOKENS
PROVIDER:[TOKEN:[[id number]]],PROVIDER:[TOKEN:[[id number]]],FREE:[LAST:[Dr. Pierson],PHONE:[(   )    -],FAX:[(   )    -]]

## 2023-04-04 NOTE — DISCHARGE NOTE PROVIDER - DETAILS OF MALNUTRITION DIAGNOSIS/DIAGNOSES
This patient has been assessed with a concern for Malnutrition and was treated during this hospitalization for the following Nutrition diagnosis/diagnoses:     -  04/01/2023: Severe protein-calorie malnutrition

## 2023-04-05 ENCOUNTER — NON-APPOINTMENT (OUTPATIENT)
Age: 78
End: 2023-04-05

## 2023-04-06 ENCOUNTER — APPOINTMENT (OUTPATIENT)
Dept: INTERNAL MEDICINE | Facility: CLINIC | Age: 78
End: 2023-04-06

## 2023-04-20 PROCEDURE — 87635 SARS-COV-2 COVID-19 AMP PRB: CPT

## 2023-04-20 PROCEDURE — 0225U NFCT DS DNA&RNA 21 SARSCOV2: CPT

## 2023-04-20 PROCEDURE — 82728 ASSAY OF FERRITIN: CPT

## 2023-04-20 PROCEDURE — 99285 EMERGENCY DEPT VISIT HI MDM: CPT

## 2023-04-20 PROCEDURE — 97161 PT EVAL LOW COMPLEX 20 MIN: CPT

## 2023-04-20 PROCEDURE — 83550 IRON BINDING TEST: CPT

## 2023-04-20 PROCEDURE — 84145 PROCALCITONIN (PCT): CPT

## 2023-04-20 PROCEDURE — 85730 THROMBOPLASTIN TIME PARTIAL: CPT

## 2023-04-20 PROCEDURE — P9016: CPT

## 2023-04-20 PROCEDURE — 82553 CREATINE MB FRACTION: CPT

## 2023-04-20 PROCEDURE — 84443 ASSAY THYROID STIM HORMONE: CPT

## 2023-04-20 PROCEDURE — 86900 BLOOD TYPING SEROLOGIC ABO: CPT

## 2023-04-20 PROCEDURE — 83540 ASSAY OF IRON: CPT

## 2023-04-20 PROCEDURE — 87040 BLOOD CULTURE FOR BACTERIA: CPT

## 2023-04-20 PROCEDURE — 81001 URINALYSIS AUTO W/SCOPE: CPT

## 2023-04-20 PROCEDURE — 71045 X-RAY EXAM CHEST 1 VIEW: CPT

## 2023-04-20 PROCEDURE — 86850 RBC ANTIBODY SCREEN: CPT

## 2023-04-20 PROCEDURE — 82746 ASSAY OF FOLIC ACID SERUM: CPT

## 2023-04-20 PROCEDURE — 83605 ASSAY OF LACTIC ACID: CPT

## 2023-04-20 PROCEDURE — 80053 COMPREHEN METABOLIC PANEL: CPT

## 2023-04-20 PROCEDURE — 70450 CT HEAD/BRAIN W/O DYE: CPT

## 2023-04-20 PROCEDURE — 83690 ASSAY OF LIPASE: CPT

## 2023-04-20 PROCEDURE — 87086 URINE CULTURE/COLONY COUNT: CPT

## 2023-04-20 PROCEDURE — 86923 COMPATIBILITY TEST ELECTRIC: CPT

## 2023-04-20 PROCEDURE — 85025 COMPLETE CBC W/AUTO DIFF WBC: CPT

## 2023-04-20 PROCEDURE — 36415 COLL VENOUS BLD VENIPUNCTURE: CPT

## 2023-04-20 PROCEDURE — 86901 BLOOD TYPING SEROLOGIC RH(D): CPT

## 2023-04-20 PROCEDURE — 82607 VITAMIN B-12: CPT

## 2023-04-20 PROCEDURE — 84466 ASSAY OF TRANSFERRIN: CPT

## 2023-04-20 PROCEDURE — 84484 ASSAY OF TROPONIN QUANT: CPT

## 2023-04-20 PROCEDURE — 85610 PROTHROMBIN TIME: CPT

## 2023-04-20 PROCEDURE — 93970 EXTREMITY STUDY: CPT

## 2023-04-20 PROCEDURE — 83735 ASSAY OF MAGNESIUM: CPT

## 2023-04-20 PROCEDURE — 93005 ELECTROCARDIOGRAM TRACING: CPT

## 2023-04-20 PROCEDURE — 36430 TRANSFUSION BLD/BLD COMPNT: CPT

## 2023-04-20 PROCEDURE — 83880 ASSAY OF NATRIURETIC PEPTIDE: CPT

## 2023-04-20 PROCEDURE — 85027 COMPLETE CBC AUTOMATED: CPT

## 2023-08-21 NOTE — PROGRESS NOTE ADULT - TIME-BASED
55
[Follow Up] : follow up
[Good Fit] : fits well
[Erythema] : erythema noted
[Mild] : mild bleeding
[Refit] : refit is not needed
[Erosion] : no evidence of erosion
[FreeTextEntry2] : removed and replaced without issue
55
55
[de-identified] : at urethral meatus where prolapse is located

## 2024-01-05 NOTE — CHART NOTE - NSCHARTNOTEFT_GEN_A_CORE
Called by RN as patient on monitor was showing V tach for about 5 minutes w/ HR of 120s. Patient was then back to NSR with HR in 90s. Patient seen and examined at bedside. Patient lying comfortably in bed, in NAD, AAOx3. Patient denies any chest pain, shortness of breath, and palpitations. Endorses night sweats which he states has been going on during his admission. Rhythm strip reviewed; appears to be QRS complex w/ bundle branch block. Will obtain EKG stat, mg, and phos for AM labs.     Rn to call with further changes    Discussed w/ Dr. Ayala Called by RN as patient on monitor was showing V tach for about 5 minutes w/ HR of 120s. Patient was then back to NSR with HR in 90s. Patient seen and examined at bedside. Patient lying comfortably in bed, in NAD, AAOx3, VSS. Patient denies any chest pain, shortness of breath, and palpitations. Endorses night sweats which he states has been going on during his admission. Rhythm strip reviewed; appears to be QRS complex w/ bundle branch block. Will obtain EKG stat, mg, and phos for AM labs.     Rn to call with further changes    Discussed w/ Dr. Ayala Called by RN as patient on monitor was showing V tach for about 5 minutes w/ HR of 120s. Patient was then back to NSR with HR in 90s. Patient seen and examined at bedside. Patient lying comfortably in bed, in NAD, AAOx3, VSS. Patient denies any chest pain, shortness of breath, and palpitations. Endorses night sweats which he states has been going on during his admission. Rhythm strip reviewed. Will obtain EKG stat, mg, and phos for AM labs.     Rn to call with further changes    Discussed w/ Dr. Ayala Called by RN as patient on monitor was showing V tach for about 5 minutes w/ HR of 120s. Patient was then back to NSR with HR in 90s. Patient seen and examined at bedside. Patient lying comfortably in bed, in NAD, AAOx3, VSS. Patient denies any chest pain, shortness of breath, and palpitations. Endorses night sweats which he states has been going on during his admission. Rhythm strip reviewed. STAT ekg ordered and reviewed; appears to be unchanged from prior. Will order mg and phos for AM.     Rn to call with further changes    Discussed w/ Dr. Ayala Called by RN as patient on monitor was showing V tach for about 5 minutes w/ HR of 120s. Patient was then back to NSR with HR in 90s. Patient seen and examined at bedside. Patient lying comfortably in bed, in NAD, AAOx3, VSS. Patient denies any chest pain, shortness of breath, and palpitations. Endorses night sweats which he states has been going on during his admission.     T(C): 36.6 (01-07-23 @ 02:21), Max: 36.8 (01-06-23 @ 04:47)  HR: 97 (01-07-23 @ 02:21) (85 - 97)  BP: 115/74 (01-07-23 @ 02:21) (104/69 - 132/76)  RR: 18 (01-07-23 @ 02:21) (18 - 18)  SpO2: 94% (01-07-23 @ 02:21) (93% - 96%)    PE:  GENERAL: NAD, lying in bed comfortably  HEAD:  Atraumatic, Normocephalic  EYES: EOMI, conjunctiva and sclera clear  ENT: Moist mucous membranes  CHEST/LUNG: Clear to auscultation bilaterally; Unlabored respirations  HEART: Regular rate and rhythm; No murmurs, rubs, or gallops  EXTREMITIES:  No clubbing, cyanosis, or edema  NERVOUS SYSTEM:  Alert & Oriented X3    A/P: 78 y/o M w/ PMH of CAD, HTN, HLD p/w chest pain, now being managed for ACS, anemia workup, and imaging concerning for renal mass w/ metastasis. Called by RN for V tach for 5 minutes.   - Rhythm strip reviewed.   - STAT ekg ordered and reviewed; appears to be unchanged from prior.   - Will order cardiac enzymes, mg, and phos STAT   - Will give 1mg empiric MG x1 STAT  - Rn to call with further changes  - Discussed w/ Dr. Ayala  - Case discussed w/ Dr. Young   - Will discuss case w/ Dr. Schultz Called by RN as patient on monitor was showing V tach for about 5 minutes w/ HR of 120s. Patient was then back to NSR with HR in 90s. Patient seen and examined at bedside. Patient lying comfortably in bed, in NAD, AAOx3, VSS. Patient denies any chest pain, shortness of breath, and palpitations. Endorses night sweats which he states has been going on during his admission.     T(C): 36.6 (01-07-23 @ 02:21), Max: 36.8 (01-06-23 @ 04:47)  HR: 97 (01-07-23 @ 02:21) (85 - 97)  BP: 115/74 (01-07-23 @ 02:21) (104/69 - 132/76)  RR: 18 (01-07-23 @ 02:21) (18 - 18)  SpO2: 94% (01-07-23 @ 02:21) (93% - 96%)    PE:  GENERAL: NAD, lying in bed comfortably  HEAD:  Atraumatic, Normocephalic  EYES: EOMI, conjunctiva and sclera clear  ENT: Moist mucous membranes  CHEST/LUNG: Clear to auscultation bilaterally; Unlabored respirations  HEART: Regular rate and rhythm; No murmurs, rubs, or gallops  EXTREMITIES:  No clubbing, cyanosis, or edema  NERVOUS SYSTEM:  Alert & Oriented X3    A/P: 78 y/o M w/ PMH of CAD, HTN, HLD p/w chest pain, now being managed for ACS, anemia workup, and imaging concerning for renal mass w/ metastasis. Called by RN for V tach for 5 minutes.   - Rhythm strip reviewed.   - STAT ekg ordered and reviewed; appears to be unchanged from prior.   - Will order cardiac enzymes, mg, and phos STAT   - Will give 1mg empiric magnesium sulfate x1 STAT  - Rn to call with further changes  - Discussed w/ Dr. Ayala  - Case discussed w/ Dr. Young   - Will discuss case w/ Dr. Schultz Called by RN as patient on monitor was showing wide complex QRS for about 5 minutes w/ HR of 120s. Patient was then back to NSR with HR in 90s. Patient seen and examined at bedside. Patient lying comfortably in bed, in NAD, AAOx3, VSS. Patient denies any chest pain, shortness of breath, and palpitations. Endorses night sweats which he states has been going on during his admission.     T(C): 36.6 (01-07-23 @ 02:21), Max: 36.8 (01-06-23 @ 04:47)  HR: 97 (01-07-23 @ 02:21) (85 - 97)  BP: 115/74 (01-07-23 @ 02:21) (104/69 - 132/76)  RR: 18 (01-07-23 @ 02:21) (18 - 18)  SpO2: 94% (01-07-23 @ 02:21) (93% - 96%)    PE:  GENERAL: NAD, lying in bed comfortably  HEAD:  Atraumatic, Normocephalic  EYES: EOMI, conjunctiva and sclera clear  ENT: Moist mucous membranes  CHEST/LUNG: Clear to auscultation bilaterally; Unlabored respirations  HEART: Regular rate and rhythm; No murmurs, rubs, or gallops  EXTREMITIES:  No clubbing, cyanosis, or edema  NERVOUS SYSTEM:  Alert & Oriented X3    A/P: 76 y/o M w/ PMH of CAD, HTN, HLD p/w chest pain, now being managed for ACS, anemia workup, and imaging concerning for renal mass w/ metastasis. Called by RN for wide complex QRS for 5 minutes.   - Rhythm strip reviewed.   - STAT ekg ordered and reviewed; appears to be unchanged from prior.   - Will order cardiac enzymes, mg, and phos STAT   - Will give 1mg empiric magnesium sulfate x1 STAT  - Rn to call with further changes  - Discussed w/ Dr. Ayala  - Case discussed w/ Dr. Young   - Will discuss case w/ Dr. Schultz Called by RN as patient on monitor was showing wide complex QRS sustained for about 5 minutes w/ HR of 120s. Patient was then back to NSR with HR in 90s. Patient seen and examined at bedside. Patient lying comfortably in bed, in NAD, AAOx3, VSS. Patient denies any chest pain, shortness of breath, and palpitations. Endorses night sweats which he states has been going on during his admission.     T(C): 36.6 (01-07-23 @ 02:21), Max: 36.8 (01-06-23 @ 04:47)  HR: 97 (01-07-23 @ 02:21) (85 - 97)  BP: 115/74 (01-07-23 @ 02:21) (104/69 - 132/76)  RR: 18 (01-07-23 @ 02:21) (18 - 18)  SpO2: 94% (01-07-23 @ 02:21) (93% - 96%)    PE:  GENERAL: NAD, lying in bed comfortably  HEAD:  Atraumatic, Normocephalic  EYES: EOMI, conjunctiva and sclera clear  ENT: Moist mucous membranes  CHEST/LUNG: Clear to auscultation bilaterally; Unlabored respirations  HEART: Regular rate and rhythm; No murmurs, rubs, or gallops  EXTREMITIES:  No clubbing, cyanosis, or edema  NERVOUS SYSTEM:  Alert & Oriented X3    A/P: 78 y/o M w/ PMH of CAD, HTN, HLD p/w chest pain, now being managed for ACS, anemia workup, and imaging concerning for renal mass w/ metastasis. Called by RN for wide complex QRS sustained for 5 minutes.   - Rhythm strip reviewed.   - STAT ekg ordered and reviewed; appears to be unchanged from prior.   - Will order cardiac enzymes, mg, and phos STAT   - Will give 1mg empiric magnesium sulfate x1 STAT  - Rn to call with further changes  - Discussed w/ Dr. Ayala  - Case discussed w/ Dr. Young   - Will discuss case w/ Dr. Schultz Called by RN as patient on monitor was showing wide complex QRS sustained for about 5 minutes w/ HR of 120s. Patient was then back to NSR with HR in 90s. Patient seen and examined at bedside. Patient lying comfortably in bed, in NAD, AAOx3, VSS. Patient denies any chest pain, shortness of breath, and palpitations. Endorses night sweats which he states has been going on during his admission.     T(C): 36.6 (01-07-23 @ 02:21), Max: 36.8 (01-06-23 @ 04:47)  HR: 97 (01-07-23 @ 02:21) (85 - 97)  BP: 115/74 (01-07-23 @ 02:21) (104/69 - 132/76)  RR: 18 (01-07-23 @ 02:21) (18 - 18)  SpO2: 94% (01-07-23 @ 02:21) (93% - 96%)    PE:  GENERAL: NAD, lying in bed comfortably  HEAD:  Atraumatic, Normocephalic  EYES: EOMI, conjunctiva and sclera clear  ENT: Moist mucous membranes  CHEST/LUNG: Clear to auscultation bilaterally; Unlabored respirations  HEART: Regular rate and rhythm; No murmurs, rubs, or gallops  EXTREMITIES:  No clubbing, cyanosis, or edema  NERVOUS SYSTEM:  Alert & Oriented X3    A/P: 76 y/o M w/ PMH of CAD, HTN, HLD p/w chest pain, now being managed for ACS, anemia workup, and imaging concerning for renal mass w/ metastasis. Called by RN for wide complex QRS sustained for 5 minutes.   - Rhythm strip reviewed; dose not appear to be VT   - STAT ekg ordered and reviewed; appears to be unchanged from prior.   - Will order cardiac enzymes, mg, and phos STAT   - Will give 1mg empiric magnesium sulfate x1 STAT  - Will give Lopressor 12.5mg PO x1 STAT   - Rn to call with further changes  - Discussed w/ Dr. Ayala  - Case discussed w/ Dr. Young   - Case discussed w/ Cardio 3 = A little assistance

## 2024-06-03 NOTE — ED ADULT NURSE NOTE - NSICDXPASTMEDICALHX_GEN_ALL_CORE_FT
PAST MEDICAL HISTORY:  CAD, multiple vessel     H/O hyperlipidemia     HTN - Hypertension     Hypertension     Renal colic     
No indicators present

## 2025-01-17 NOTE — CHART NOTE - NSCHARTNOTEFT_GEN_A_CORE
Called by RN for patient febrile to 100.5. Per chart review this is patient's first fever since admission. Patient seen and examined at bedside. Patient reports feeling warm and sweating, states this happens to him at home as well and he sweats through multiple shirts at night, but usually does not have a fever. He otherwise feels well. Denies headache, chills, CP, SOB, abdominal pain, n/v/d. Admitted for ACS, anemia workup, and imaging concerning for renal mass w/ metastasis.    T(C): 38.1 (01-08-23 @ 01:20), Max: 38.1 (01-08-23 @ 01:20)  HR: 81 (01-08-23 @ 01:20) (70 - 97)  BP: 114/68 (01-08-23 @ 01:20) (113/64 - 127/58)  RR: 16 (01-08-23 @ 01:20) (16 - 18)  SpO2: 93% (01-08-23 @ 01:20) (92% - 97%)  Wt(kg): --    Physical Exam:   GENERAL: well-groomed, well-developed, NAD  HEENT: head NC/AT; EOM intact, conjunctiva & sclera clear; hearing grossly intact, moist mucous membranes  NECK: supple, no JVD  RESPIRATORY: +rhonchi b/l bases, no wheezing, rales, or rubs  CARDIOVASCULAR: S1&S2, RRR, no murmurs or gallops  ABDOMEN: soft, non-tender, non-distended, + Bowel sounds, no guarding, rebound or rigidity  MUSCULOSKELETAL:  no clubbing, cyanosis or edema of all 4 extremities  SKIN: warm and dry, color normal  NEUROLOGIC: AA&O X3, responds appropriately to questions and commands  Psych: Normal mood and affect, normal behavior        STAT CBC, CMP, Lactate, BCx, UCx, CXR Called by RN for patient febrile to 100.5. Per chart review this is patient's first fever since admission. Patient seen and examined at bedside. Patient reports feeling warm and sweating, states this happens to him at home as well and he sweats through multiple shirts at night, but usually does not have a fever. He otherwise feels well. Denies headache, chills, CP, SOB, abdominal pain, n/v/d. Admitted for ACS, anemia workup, and imaging concerning for renal mass w/ metastasis.    T(C): 38.1 (01-08-23 @ 01:20), Max: 38.1 (01-08-23 @ 01:20)  HR: 81 (01-08-23 @ 01:20) (70 - 97)  BP: 114/68 (01-08-23 @ 01:20) (113/64 - 127/58)  RR: 16 (01-08-23 @ 01:20) (16 - 18)  SpO2: 93% (01-08-23 @ 01:20) (92% - 97%)  Wt(kg): --    Physical Exam:   GENERAL: well-groomed, well-developed, NAD  HEENT: head NC/AT; EOM intact, conjunctiva & sclera clear; hearing grossly intact, moist mucous membranes  NECK: supple, no JVD  RESPIRATORY: +rhonchi b/l bases, no wheezing, rales, or rubs  CARDIOVASCULAR: S1&S2, RRR, no murmurs or gallops  ABDOMEN: soft, non-tender, non-distended, + Bowel sounds, no guarding, rebound or rigidity  MUSCULOSKELETAL:  no clubbing, cyanosis or edema of all 4 extremities  SKIN: warm and dry, color normal  NEUROLOGIC: AA&O X3, responds appropriately to questions and commands  Psych: Normal mood and affect, normal behavior        Patient with elevated LFTs. Will give 600mg PO Motrin x1 for fever. STAT CBC, CMP, Lactate, BCx x2, UCx, CXR ordered. lovenox
